# Patient Record
Sex: FEMALE | Race: WHITE | NOT HISPANIC OR LATINO | Employment: OTHER | URBAN - METROPOLITAN AREA
[De-identification: names, ages, dates, MRNs, and addresses within clinical notes are randomized per-mention and may not be internally consistent; named-entity substitution may affect disease eponyms.]

---

## 2017-02-07 RX ORDER — LEVOTHYROXINE SODIUM 0.03 MG/1
TABLET ORAL
Qty: 90 TABLET | Refills: 1 | Status: SHIPPED | OUTPATIENT
Start: 2017-02-07 | End: 2017-08-11 | Stop reason: SDUPTHER

## 2017-02-07 RX ORDER — PRAVASTATIN SODIUM 40 MG
TABLET ORAL
Qty: 90 TABLET | Refills: 1 | Status: SHIPPED | OUTPATIENT
Start: 2017-02-07 | End: 2017-08-11 | Stop reason: SDUPTHER

## 2017-02-07 RX ORDER — OMEPRAZOLE 20 MG/1
CAPSULE, DELAYED RELEASE ORAL
Qty: 90 CAPSULE | Refills: 1 | Status: SHIPPED | OUTPATIENT
Start: 2017-02-07 | End: 2017-08-11 | Stop reason: SDUPTHER

## 2017-02-11 ENCOUNTER — OFFICE VISIT (OUTPATIENT)
Dept: FAMILY MEDICINE CLINIC | Facility: CLINIC | Age: 67
End: 2017-02-11

## 2017-02-11 VITALS
BODY MASS INDEX: 25.87 KG/M2 | HEIGHT: 63 IN | WEIGHT: 146 LBS | SYSTOLIC BLOOD PRESSURE: 110 MMHG | TEMPERATURE: 97.8 F | DIASTOLIC BLOOD PRESSURE: 64 MMHG

## 2017-02-11 DIAGNOSIS — J20.9 ACUTE BRONCHITIS, UNSPECIFIED ORGANISM: Primary | ICD-10-CM

## 2017-02-11 DIAGNOSIS — E03.9 ACQUIRED HYPOTHYROIDISM: ICD-10-CM

## 2017-02-11 DIAGNOSIS — E78.01 FAMILIAL HYPERCHOLESTEROLEMIA: ICD-10-CM

## 2017-02-11 DIAGNOSIS — J06.9 ACUTE URI: ICD-10-CM

## 2017-02-11 PROCEDURE — 99213 OFFICE O/P EST LOW 20 MIN: CPT | Performed by: FAMILY MEDICINE

## 2017-02-11 RX ORDER — ALBUTEROL SULFATE 90 UG/1
2 AEROSOL, METERED RESPIRATORY (INHALATION) EVERY 4 HOURS PRN
Qty: 1 INHALER | Refills: 2 | Status: SHIPPED | OUTPATIENT
Start: 2017-02-11 | End: 2017-08-29

## 2017-02-11 RX ORDER — BENZONATATE 200 MG/1
200 CAPSULE ORAL 3 TIMES DAILY PRN
Qty: 39 CAPSULE | Refills: 3 | Status: SHIPPED | OUTPATIENT
Start: 2017-02-11 | End: 2018-07-16

## 2017-02-11 RX ORDER — AZITHROMYCIN 250 MG/1
TABLET, FILM COATED ORAL
Qty: 6 TABLET | Refills: 1 | Status: SHIPPED | OUTPATIENT
Start: 2017-02-11 | End: 2017-08-29

## 2017-02-11 NOTE — PROGRESS NOTES
Chief Complaint   Patient presents with   • Cough     dry cough, sore throat, x 2 days       Subjective.../HPI  Patient present today with cough. Ok if quite. Has coughing spells. Slight productive. Sore throat. Symptoms x 2 days.  I have reviewed the patient's medical history in detail and updated the computerized patient record.    Family History   Problem Relation Age of Onset   • Diabetes Maternal Grandmother        Social History     Social History   • Marital status:      Spouse name: N/A   • Number of children: N/A   • Years of education: N/A     Occupational History   • Not on file.     Social History Main Topics   • Smoking status: Never Smoker   • Smokeless tobacco: Never Used   • Alcohol use Yes      Comment: rarely   • Drug use: No   • Sexual activity: Not on file     Other Topics Concern   • Not on file     Social History Narrative       Review of Systems:   Review of Systems   Constitutional: Negative for chills, fatigue, fever and unexpected weight change.   HENT: Positive for sore throat. Negative for ear pain, hearing loss, sinus pressure and tinnitus.    Eyes: Negative for pain, discharge and redness.   Respiratory: Positive for cough. Negative for shortness of breath and wheezing.    Cardiovascular: Negative for chest pain, palpitations and leg swelling.   Gastrointestinal: Negative for abdominal pain, constipation, diarrhea and nausea.   Endocrine: Negative for cold intolerance and heat intolerance.   Genitourinary: Negative for difficulty urinating, flank pain and urgency.   Musculoskeletal: Negative for back pain, joint swelling and myalgias.   Skin: Negative for rash and wound.   Allergic/Immunologic: Negative for environmental allergies and food allergies.   Neurological: Negative for dizziness, seizures, numbness and headaches.   Hematological: Negative for adenopathy. Does not bruise/bleed easily.   Psychiatric/Behavioral: Negative for decreased concentration, dysphoric mood and  "sleep disturbance. The patient is not nervous/anxious.    All other systems reviewed and are negative.      Objective:  Vital Signs     Vitals:    02/11/17 1050   BP: 110/64   BP Location: Left arm   Patient Position: Sitting   Temp: 97.8 °F (36.6 °C)   TempSrc: Oral   Weight: 146 lb (66.2 kg)   Height: 63\" (160 cm)     Physical Exam   Constitutional: She is oriented to person, place, and time. She appears well-developed and well-nourished.   HENT:   Head: Normocephalic.   Eyes: Pupils are equal, round, and reactive to light.   Neck: Normal range of motion.   Cardiovascular: Normal rate and regular rhythm.    Pulmonary/Chest: Effort normal. She has wheezes (slight raspy with cough).   Abdominal: Soft.   Musculoskeletal: Normal range of motion.   Neurological: She is alert and oriented to person, place, and time.   Skin: Skin is warm and dry.        Results Review:      REVIEWED AND DISCUSSED CLINICAL RESULTS WITH PATIENT                          Current Outpatient Prescriptions:   •  Biotin 2500 MCG capsule, Take 2 tablets by mouth Daily., Disp: , Rfl:   •  levothyroxine (SYNTHROID, LEVOTHROID) 25 MCG tablet, TAKE 1 TABLET EVERY DAY, Disp: 90 tablet, Rfl: 1  •  omeprazole (priLOSEC) 20 MG capsule, TAKE 1 CAPSULE ONE TIME DAILY, Disp: 90 capsule, Rfl: 1  •  pravastatin (PRAVACHOL) 40 MG tablet, TAKE 1 TABLET ONE TIME DAILY, Disp: 90 tablet, Rfl: 1  •  albuterol (PROVENTIL HFA;VENTOLIN HFA) 108 (90 BASE) MCG/ACT inhaler, Inhale 2 puffs Every 4 (Four) Hours As Needed for wheezing., Disp: 1 inhaler, Rfl: 2  •  azithromycin (ZITHROMAX Z-SRINATH) 250 MG tablet, Take 2 tablets the first day, then 1 tablet daily for 4 days., Disp: 6 tablet, Rfl: 1  •  benzonatate (TESSALON) 200 MG capsule, Take 1 capsule by mouth 3 (Three) Times a Day As Needed for cough., Disp: 39 capsule, Rfl: 3    Procedures    Assessment/Plan     Diagnoses and all orders for this visit:    Acute bronchitis, unspecified organism    Acute URI    Acquired " hypothyroidism    Familial hypercholesterolemia    Other orders  -     azithromycin (ZITHROMAX Z-SRINATH) 250 MG tablet; Take 2 tablets the first day, then 1 tablet daily for 4 days.  -     benzonatate (TESSALON) 200 MG capsule; Take 1 capsule by mouth 3 (Three) Times a Day As Needed for cough.  -     albuterol (PROVENTIL HFA;VENTOLIN HFA) 108 (90 BASE) MCG/ACT inhaler; Inhale 2 puffs Every 4 (Four) Hours As Needed for wheezing.           Ari Gloria Jr., MD  02/11/17  12:17 PM

## 2017-08-11 RX ORDER — LEVOTHYROXINE SODIUM 0.03 MG/1
TABLET ORAL
Qty: 90 TABLET | Refills: 0 | Status: SHIPPED | OUTPATIENT
Start: 2017-08-11 | End: 2017-10-17 | Stop reason: SDUPTHER

## 2017-08-11 RX ORDER — OMEPRAZOLE 20 MG/1
CAPSULE, DELAYED RELEASE ORAL
Qty: 90 CAPSULE | Refills: 0 | Status: SHIPPED | OUTPATIENT
Start: 2017-08-11 | End: 2017-10-17 | Stop reason: SDUPTHER

## 2017-08-11 RX ORDER — PRAVASTATIN SODIUM 40 MG
TABLET ORAL
Qty: 90 TABLET | Refills: 0 | Status: SHIPPED | OUTPATIENT
Start: 2017-08-11 | End: 2017-10-17 | Stop reason: SDUPTHER

## 2017-08-29 ENCOUNTER — OFFICE VISIT (OUTPATIENT)
Dept: FAMILY MEDICINE CLINIC | Facility: CLINIC | Age: 67
End: 2017-08-29

## 2017-08-29 VITALS
HEIGHT: 63 IN | WEIGHT: 140 LBS | OXYGEN SATURATION: 98 % | SYSTOLIC BLOOD PRESSURE: 108 MMHG | TEMPERATURE: 97.8 F | HEART RATE: 97 BPM | DIASTOLIC BLOOD PRESSURE: 64 MMHG | BODY MASS INDEX: 24.8 KG/M2

## 2017-08-29 DIAGNOSIS — R42 VERTIGO: ICD-10-CM

## 2017-08-29 DIAGNOSIS — R79.9 ABNORMAL FINDING OF BLOOD CHEMISTRY: ICD-10-CM

## 2017-08-29 DIAGNOSIS — K21.00 GASTROESOPHAGEAL REFLUX DISEASE WITH ESOPHAGITIS: Primary | ICD-10-CM

## 2017-08-29 DIAGNOSIS — E78.01 FAMILIAL HYPERCHOLESTEROLEMIA: ICD-10-CM

## 2017-08-29 DIAGNOSIS — E03.9 ACQUIRED HYPOTHYROIDISM: ICD-10-CM

## 2017-08-29 DIAGNOSIS — R94.6 ABNORMAL RESULTS OF THYROID FUNCTION STUDIES: ICD-10-CM

## 2017-08-29 PROCEDURE — 99213 OFFICE O/P EST LOW 20 MIN: CPT | Performed by: FAMILY MEDICINE

## 2017-08-29 NOTE — PROGRESS NOTES
"  Chief Complaint   Patient presents with   • Earache     pt has sore spot behind right ear its getting worse       Subjective.../HPI  Patient present today with sore knot behind right ear. Feels like a rubber band. First noticed this in her 30's. This time sore x 6 weeks but much better now., began after swimming in pool. Getting dizzy x 6 weeks when laying down and gets up.     I have reviewed the patient's medical history in detail and updated the computerized patient record.    Family History   Problem Relation Age of Onset   • Diabetes Maternal Grandmother        Social History     Social History   • Marital status:      Spouse name: N/A   • Number of children: N/A   • Years of education: N/A     Occupational History   • Not on file.     Social History Main Topics   • Smoking status: Never Smoker   • Smokeless tobacco: Never Used   • Alcohol use Yes      Comment: rarely   • Drug use: No   • Sexual activity: Not on file     Other Topics Concern   • Not on file     Social History Narrative       Review of Systems:   Review of Systems   Constitutional: Negative.    HENT: Positive for facial swelling.    Eyes: Negative.    Respiratory: Negative.    Cardiovascular: Negative.    Gastrointestinal: Negative.    Endocrine: Negative.    Genitourinary: Negative.    Skin: Negative.    Allergic/Immunologic: Negative.    Neurological: Positive for headaches.   Psychiatric/Behavioral: Negative.        Objective:  Vital Signs     Vitals:    08/29/17 1635   BP: 108/64   BP Location: Left arm   Patient Position: Sitting   Pulse: 97   Temp: 97.8 °F (36.6 °C)   TempSrc: Oral   SpO2: 98%   Weight: 140 lb (63.5 kg)   Height: 63\" (160 cm)     Physical Exam   Constitutional: She is oriented to person, place, and time. She appears well-developed and well-nourished.   HENT:   Head: Normocephalic.   Tender node behind right ear. Minimal tenderness.    Eyes: Pupils are equal, round, and reactive to light.   Neck: Normal range of " motion.   Cardiovascular: Normal rate, regular rhythm and normal heart sounds.    Pulmonary/Chest: Effort normal.   Abdominal: Soft.   Musculoskeletal: Normal range of motion.   Neurological: She is alert and oriented to person, place, and time.   Skin: Skin is warm and dry.        Results Review:      REVIEWED AND DISCUSSED CLINICAL RESULTS WITH PATIENT                          Current Outpatient Prescriptions:   •  benzonatate (TESSALON) 200 MG capsule, Take 1 capsule by mouth 3 (Three) Times a Day As Needed for cough., Disp: 39 capsule, Rfl: 3  •  Biotin 2500 MCG capsule, Take 2 tablets by mouth Daily., Disp: , Rfl:   •  levothyroxine (SYNTHROID, LEVOTHROID) 25 MCG tablet, TAKE 1 TABLET EVERY DAY, Disp: 90 tablet, Rfl: 0  •  omeprazole (priLOSEC) 20 MG capsule, TAKE 1 CAPSULE EVERY DAY, Disp: 90 capsule, Rfl: 0  •  pravastatin (PRAVACHOL) 40 MG tablet, TAKE 1 TABLET EVERY DAY, Disp: 90 tablet, Rfl: 0    Procedures    Assessment/Plan     Diagnoses and all orders for this visit:    Gastroesophageal reflux disease with esophagitis  -     MRI internal auditory canal wo  -     CBC & Differential  -     Comprehensive Metabolic Panel  -     Lipid Panel With LDL / HDL Ratio  -     Hemoglobin A1c  -     Thyroid Panel With TSH    Vertigo  -     MRI internal auditory canal wo  -     CBC & Differential  -     Comprehensive Metabolic Panel  -     Lipid Panel With LDL / HDL Ratio  -     Hemoglobin A1c  -     Thyroid Panel With TSH    Familial hypercholesterolemia    Acquired hypothyroidism    Abnormal finding of blood chemistry   -     Lipid Panel With LDL / HDL Ratio  -     Hemoglobin A1c    Abnormal results of thyroid function studies   -     Thyroid Panel With TSH       elevate head of bed on 4 inch blocks.   Decrease omeprazole to m/w/f only    Ari Gloria Jr., MD  08/29/17  5:59 PM

## 2017-09-02 LAB
ALBUMIN SERPL-MCNC: 4.2 G/DL (ref 3.6–4.8)
ALBUMIN/GLOB SERPL: 1.6 {RATIO} (ref 1.2–2.2)
ALP SERPL-CCNC: 69 IU/L (ref 39–117)
ALT SERPL-CCNC: 15 IU/L (ref 0–32)
AST SERPL-CCNC: 25 IU/L (ref 0–40)
BASOPHILS # BLD AUTO: 0 X10E3/UL (ref 0–0.2)
BASOPHILS NFR BLD AUTO: 1 %
BILIRUB SERPL-MCNC: 0.6 MG/DL (ref 0–1.2)
BUN SERPL-MCNC: 12 MG/DL (ref 8–27)
BUN/CREAT SERPL: 14 (ref 12–28)
CALCIUM SERPL-MCNC: 9.3 MG/DL (ref 8.7–10.3)
CHLORIDE SERPL-SCNC: 103 MMOL/L (ref 96–106)
CHOLEST SERPL-MCNC: 179 MG/DL (ref 100–199)
CO2 SERPL-SCNC: 26 MMOL/L (ref 18–29)
CREAT SERPL-MCNC: 0.83 MG/DL (ref 0.57–1)
EOSINOPHIL # BLD AUTO: 0.1 X10E3/UL (ref 0–0.4)
EOSINOPHIL NFR BLD AUTO: 2 %
ERYTHROCYTE [DISTWIDTH] IN BLOOD BY AUTOMATED COUNT: 16.8 % (ref 12.3–15.4)
FT4I SERPL CALC-MCNC: 2.5 (ref 1.2–4.9)
GLOBULIN SER CALC-MCNC: 2.7 G/DL (ref 1.5–4.5)
GLUCOSE SERPL-MCNC: 87 MG/DL (ref 65–99)
HBA1C MFR BLD: 5.9 % (ref 4.8–5.6)
HCT VFR BLD AUTO: 36.9 % (ref 34–46.6)
HDLC SERPL-MCNC: 57 MG/DL
HGB BLD-MCNC: 11.6 G/DL (ref 11.1–15.9)
IMM GRANULOCYTES # BLD: 0 X10E3/UL (ref 0–0.1)
IMM GRANULOCYTES NFR BLD: 0 %
LDLC SERPL CALC-MCNC: 100 MG/DL (ref 0–99)
LDLC/HDLC SERPL: 1.8 RATIO UNITS (ref 0–3.2)
LYMPHOCYTES # BLD AUTO: 1.2 X10E3/UL (ref 0.7–3.1)
LYMPHOCYTES NFR BLD AUTO: 34 %
MCH RBC QN AUTO: 26.3 PG (ref 26.6–33)
MCHC RBC AUTO-ENTMCNC: 31.4 G/DL (ref 31.5–35.7)
MCV RBC AUTO: 84 FL (ref 79–97)
MONOCYTES # BLD AUTO: 0.4 X10E3/UL (ref 0.1–0.9)
MONOCYTES NFR BLD AUTO: 10 %
NEUTROPHILS # BLD AUTO: 1.9 X10E3/UL (ref 1.4–7)
NEUTROPHILS NFR BLD AUTO: 53 %
PLATELET # BLD AUTO: 269 X10E3/UL (ref 150–379)
POTASSIUM SERPL-SCNC: 5.2 MMOL/L (ref 3.5–5.2)
PROT SERPL-MCNC: 6.9 G/DL (ref 6–8.5)
RBC # BLD AUTO: 4.41 X10E6/UL (ref 3.77–5.28)
SODIUM SERPL-SCNC: 141 MMOL/L (ref 134–144)
T3RU NFR SERPL: 27 % (ref 24–39)
T4 SERPL-MCNC: 9.4 UG/DL (ref 4.5–12)
TRIGL SERPL-MCNC: 111 MG/DL (ref 0–149)
TSH SERPL DL<=0.005 MIU/L-ACNC: 2.42 UIU/ML (ref 0.45–4.5)
VLDLC SERPL CALC-MCNC: 22 MG/DL (ref 5–40)
WBC # BLD AUTO: 3.6 X10E3/UL (ref 3.4–10.8)

## 2017-09-11 ENCOUNTER — HOSPITAL ENCOUNTER (OUTPATIENT)
Dept: MRI IMAGING | Facility: HOSPITAL | Age: 67
Discharge: HOME | End: 2017-09-11
Attending: FAMILY MEDICINE | Admitting: FAMILY MEDICINE

## 2017-09-11 PROCEDURE — 70551 MRI BRAIN STEM W/O DYE: CPT

## 2017-09-18 ENCOUNTER — TELEPHONE (OUTPATIENT)
Dept: FAMILY MEDICINE CLINIC | Facility: CLINIC | Age: 67
End: 2017-09-18

## 2017-09-18 DIAGNOSIS — R42 VERTIGO: Primary | ICD-10-CM

## 2017-09-18 NOTE — TELEPHONE ENCOUNTER
----- Message from Maryan Flanagan sent at 9/18/2017  1:14 PM EDT -----  Regarding: Results  Contact: [phone]  Patient had labs done on 9/1/17 and MRI on 9/11/17. She would like a call regarding the results.    Informed patient to give 48 hrs for call back.    Thank you  Last office visit 8/29/17

## 2017-09-18 NOTE — PROGRESS NOTES
I told the patient the results of her MRI of the brain and her lab tests.  She still is having some dizziness.  We'll get ENT consult.

## 2017-10-18 RX ORDER — OMEPRAZOLE 20 MG/1
CAPSULE, DELAYED RELEASE ORAL
Qty: 90 CAPSULE | Refills: 2 | Status: SHIPPED | OUTPATIENT
Start: 2017-10-18 | End: 2018-01-18 | Stop reason: SDUPTHER

## 2017-10-18 RX ORDER — PRAVASTATIN SODIUM 40 MG
TABLET ORAL
Qty: 90 TABLET | Refills: 2 | Status: SHIPPED | OUTPATIENT
Start: 2017-10-18 | End: 2018-01-18 | Stop reason: SDUPTHER

## 2017-10-18 RX ORDER — LEVOTHYROXINE SODIUM 0.03 MG/1
TABLET ORAL
Qty: 90 TABLET | Refills: 2 | Status: SHIPPED | OUTPATIENT
Start: 2017-10-18 | End: 2018-01-18 | Stop reason: SDUPTHER

## 2017-11-12 ENCOUNTER — APPOINTMENT (OUTPATIENT)
Dept: GENERAL RADIOLOGY | Facility: HOSPITAL | Age: 67
End: 2017-11-12

## 2017-11-12 ENCOUNTER — HOSPITAL ENCOUNTER (EMERGENCY)
Facility: HOSPITAL | Age: 67
Discharge: HOME | End: 2017-11-12
Attending: EMERGENCY MEDICINE | Admitting: EMERGENCY MEDICINE

## 2017-11-12 VITALS
HEIGHT: 63 IN | TEMPERATURE: 98.1 F | WEIGHT: 138 LBS | HEART RATE: 81 BPM | RESPIRATION RATE: 18 BRPM | SYSTOLIC BLOOD PRESSURE: 125 MMHG | DIASTOLIC BLOOD PRESSURE: 70 MMHG | OXYGEN SATURATION: 100 % | BODY MASS INDEX: 24.45 KG/M2

## 2017-11-12 DIAGNOSIS — S82.891A CLOSED FRACTURE OF RIGHT ANKLE, INITIAL ENCOUNTER: Primary | ICD-10-CM

## 2017-11-12 PROCEDURE — 73610 X-RAY EXAM OF ANKLE: CPT

## 2017-11-12 PROCEDURE — 99282 EMERGENCY DEPT VISIT SF MDM: CPT

## 2017-11-12 RX ORDER — ONDANSETRON 4 MG/1
4 TABLET, FILM COATED ORAL EVERY 8 HOURS PRN
Qty: 15 TABLET | Refills: 0 | Status: SHIPPED | OUTPATIENT
Start: 2017-11-12 | End: 2018-07-16

## 2017-11-12 RX ORDER — UBIDECARENONE 75 MG
50 CAPSULE ORAL DAILY
COMMUNITY
End: 2018-07-16 | Stop reason: DRUGHIGH

## 2017-11-12 RX ORDER — HYDROCODONE BITARTRATE AND ACETAMINOPHEN 7.5; 325 MG/1; MG/1
1 TABLET ORAL EVERY 6 HOURS PRN
Qty: 15 TABLET | Refills: 0 | Status: SHIPPED | OUTPATIENT
Start: 2017-11-12 | End: 2018-07-16

## 2017-11-12 NOTE — ED TRIAGE NOTES
"Patient c/o right foot/ankle pain post fall; pt reports she was walking down the steps when she mis-stepped and landed toe first; after this the patient heard a \"cracking sound\"  Pt reports she fell but denies hitting head and states, \"I caught myself\" No obvious deformity and pedal pulses present in both lower extremities, pt denies any abnormal sensations in right foot other than pain in on the top of her foot.  "

## 2017-11-13 NOTE — ED PROVIDER NOTES
I supervised care provided by the midlevel provider.    We have discussed this patient's history, physical exam, and treatment plan.   I have reviewed the note and personally saw and examined the patient and agree with the plan of care.    Brief Hx - Fell and injured ankle.    PE - Mild TTP at lateral malleolus    Reviewed xray.  Will place boot and FU ortho.         Ronnell Moralez MD  11/12/17 2015

## 2017-11-13 NOTE — ED PROVIDER NOTES
EMERGENCY DEPARTMENT ENCOUNTER    CHIEF COMPLAINT  Chief Complaint: R ankle pain  History given by: pt   History limited by: none  Room Number: 28/28  PMD: Ari Gloria Jr., MD      HPI:  Pt is a 67 y.o. female who presents complaining of R ankle pain s/p injury at 1800 today that increases with movement of her foot. Pt state that she was walking down stairs, did not completely plant her foot, and hyper-plamtarflexed her R foot. Pt states that she collapsed due to her pain, but denies falling, hitting her head, or LOC. Pt denies a Hx of ankle injuries.     Duration:  Since 1800 today  Onset: s/p injury   Timing: constant   Location: R ankle   Radiation: none stated   Quality: pain   Intensity/Severity: moderate   Progression: unchanged   Associated Symptoms: none   Aggravating Factors: foot movement   Alleviating Factors: none stated   Previous Episodes: none stated   Treatment before arrival: none     PAST MEDICAL HISTORY  Active Ambulatory Problems     Diagnosis Date Noted   • No Active Ambulatory Problems     Resolved Ambulatory Problems     Diagnosis Date Noted   • No Resolved Ambulatory Problems     Past Medical History:   Diagnosis Date   • Dizziness    • Fibrocystic breast    • GERD (gastroesophageal reflux disease)    • Hyperlipidemia    • Hypothyroidism    • Seizures    • Syncopal episodes        PAST SURGICAL HISTORY  Past Surgical History:   Procedure Laterality Date   • BLADDER SURGERY  2015    bladder lift   • HYSTERECTOMY  2015   • OOPHORECTOMY         FAMILY HISTORY  Family History   Problem Relation Age of Onset   • Diabetes Maternal Grandmother        SOCIAL HISTORY  Social History     Social History   • Marital status:      Spouse name: N/A   • Number of children: N/A   • Years of education: N/A     Occupational History   • Not on file.     Social History Main Topics   • Smoking status: Never Smoker   • Smokeless tobacco: Never Used   • Alcohol use Yes      Comment: rarely   • Drug  use: No   • Sexual activity: Defer     Other Topics Concern   • Not on file     Social History Narrative   • No narrative on file       ALLERGIES  Review of patient's allergies indicates no known allergies.    REVIEW OF SYSTEMS  Review of Systems   Constitutional: Negative.    HENT: Negative.    Respiratory: Negative.    Cardiovascular: Negative.    Gastrointestinal: Negative.    Musculoskeletal: Positive for myalgias (R ankle).   Skin: Negative.    Neurological: Negative.  Negative for dizziness, weakness, light-headedness and numbness.       PHYSICAL EXAM  ED Triage Vitals   Temp Heart Rate Resp BP SpO2   11/12/17 1820 11/12/17 1820 11/12/17 1823 11/12/17 1823 11/12/17 1820   98.1 °F (36.7 °C) 81 18 125/70 100 %      Temp src Heart Rate Source Patient Position BP Location FiO2 (%)   11/12/17 1820 -- -- -- --   Tympanic           Physical Exam   Constitutional: She is oriented to person, place, and time and well-developed, well-nourished, and in no distress.   Eyes: EOM are normal.   Neck: Normal range of motion.   Cardiovascular: Normal rate and regular rhythm.    Pulmonary/Chest: Effort normal and breath sounds normal. No respiratory distress.   Musculoskeletal: She exhibits tenderness (R anterior ankle). She exhibits no deformity.   No medial or lateral ankle tenderness   Neurological: She is alert and oriented to person, place, and time. She has normal sensation and normal strength.   Neurovascularly intact   Skin: Skin is warm and dry.   Psychiatric: Affect normal.   Nursing note and vitals reviewed.      RADIOLOGY  XR Ankle 3+ View Right   Final Result   1. No acute osseous abnormality.       This report was finalized on 11/12/2017 7:13 PM by Dustin Loo MD.               I ordered the above noted radiological studies. Interpreted by radiologist. Reviewed by me in PACS.       PROCEDURES  Procedures      PROGRESS AND CONSULTS  ED Course   1840  Ordered XR R ankle for further evaluation.   1932  Discussed  the pt's XR findings with no definite acute fracture, but possible small avulsion fracture. Plan to d/c the pt in a boot and with a f/u with an orthopedist. Pt understands and agrees with the plan, and all questions were answered.   2004  Discussed the pt's case with Dr. Moralez. After a bedside examination, Dr. Moralez agrees with the plan of care.   2024  Rechecked pt, who is resting comfortably. Discussed plan to d/c the pt in a boot and to f/u with an orthopedist. Pt understands and agrees with the plan, and all questions were answered.     MEDICAL DECISION MAKING  Results were reviewed/discussed with the patient and they were also made aware of online access. Pt also made aware that some labs, such as cultures, will not be resulted during ER visit and follow up with PMD is necessary.     MDM  Number of Diagnoses or Management Options  Closed fracture of right ankle, initial encounter:      Amount and/or Complexity of Data Reviewed  Tests in the radiology section of CPT®: ordered and reviewed (XR L ankle: no acute fracture; small avulsion injury along dorsal talus. )    Patient Progress  Patient progress: stable         DIAGNOSIS  Final diagnoses:   Closed fracture of right ankle, initial encounter       DISPOSITION  DISCHARGE    Patient discharged in stable condition.    Reviewed implications of results, diagnosis, meds, responsibility to follow up, warning signs and symptoms of possible worsening, potential complications and reasons to return to ER.    Patient/Family voiced understanding of above instructions.    Discussed plan for discharge, as there is no emergent indication for admission.  Pt/family is agreeable and understands need for follow up and repeat testing.  Pt is aware that discharge does not mean that nothing is wrong but it indicates no emergency is present that requires admission and they must continue care with follow-up as given below or physician of their choice.     FOLLOW-UP  Ayo JUAREZ  MD [facility]  [address]  [facility] [address]  [phone]    Call in 1 day  to set up a follow up appointment         Medication List      New Prescriptions          HYDROcodone-acetaminophen 7.5-325 MG per tablet   Commonly known as:  NORCO   Take 1 tablet by mouth Every 6 (Six) Hours As Needed for Severe Pain .       ondansetron 4 MG tablet   Commonly known as:  ZOFRAN   Take 1 tablet by mouth Every 8 (Eight) Hours As Needed for Nausea or   Vomiting.         Changed          omeprazole 20 MG capsule   Commonly known as:  priLOSEC   TAKE 1 CAPSULE EVERY DAY   What changed:  See the new instructions.         Stop          benzonatate 200 MG capsule   Commonly known as:  TESSALON               Latest Documented Vital Signs:  As of 8:31 PM  BP- 125/70 HR- 81 Temp- 98.1 °F (36.7 °C) (Tympanic) O2 sat- 100%    --  Documentation assistance provided by chucky Liriano for TYRON Da Silva.  Information recorded by the scribe was done at my direction and has been verified and validated by me.       Biju Liriano  11/12/17 2031       TYRON Martínez  11/12/17 2128

## 2017-11-15 ENCOUNTER — TELEPHONE (OUTPATIENT)
Dept: SOCIAL WORK | Facility: HOSPITAL | Age: 67
End: 2017-11-15

## 2017-11-15 NOTE — TELEPHONE ENCOUNTER
ER F/U phone call:  Pt states that she is doing well. Denies the need to see her PCP or the orthopedist. No other questions or concerns voiced at this time. Marcela Rodriguez RN

## 2017-12-14 ENCOUNTER — EPISODE CHANGES (OUTPATIENT)
Dept: CASE MANAGEMENT | Facility: OTHER | Age: 67
End: 2017-12-14

## 2018-01-18 ENCOUNTER — OFFICE VISIT (OUTPATIENT)
Dept: OBSTETRICS AND GYNECOLOGY | Facility: CLINIC | Age: 68
End: 2018-01-18

## 2018-01-18 ENCOUNTER — PROCEDURE VISIT (OUTPATIENT)
Dept: OBSTETRICS AND GYNECOLOGY | Facility: CLINIC | Age: 68
End: 2018-01-18

## 2018-01-18 VITALS
DIASTOLIC BLOOD PRESSURE: 80 MMHG | HEIGHT: 55 IN | SYSTOLIC BLOOD PRESSURE: 120 MMHG | WEIGHT: 137.5 LBS | BODY MASS INDEX: 31.82 KG/M2

## 2018-01-18 DIAGNOSIS — M85.88 OSTEOPENIA OF SPINE: ICD-10-CM

## 2018-01-18 DIAGNOSIS — Z78.0 POSTMENOPAUSAL: ICD-10-CM

## 2018-01-18 DIAGNOSIS — Z01.419 GYNECOLOGIC EXAM NORMAL: Primary | ICD-10-CM

## 2018-01-18 DIAGNOSIS — Z13.820 SCREENING FOR OSTEOPOROSIS: Primary | ICD-10-CM

## 2018-01-18 DIAGNOSIS — M85.88 OSTEOPENIA OF OTHER SITE: ICD-10-CM

## 2018-01-18 PROCEDURE — 77080 DXA BONE DENSITY AXIAL: CPT | Performed by: NURSE PRACTITIONER

## 2018-01-18 PROCEDURE — G0101 CA SCREEN;PELVIC/BREAST EXAM: HCPCS | Performed by: NURSE PRACTITIONER

## 2018-01-18 RX ORDER — OMEPRAZOLE 20 MG/1
20 CAPSULE, DELAYED RELEASE ORAL DAILY
Qty: 90 CAPSULE | Refills: 2 | Status: SHIPPED | OUTPATIENT
Start: 2018-01-18 | End: 2018-01-24 | Stop reason: SDUPTHER

## 2018-01-18 RX ORDER — LEVOTHYROXINE SODIUM 0.03 MG/1
25 TABLET ORAL DAILY
Qty: 90 TABLET | Refills: 2 | Status: SHIPPED | OUTPATIENT
Start: 2018-01-18 | End: 2018-01-24 | Stop reason: SDUPTHER

## 2018-01-18 RX ORDER — PRAVASTATIN SODIUM 40 MG
40 TABLET ORAL DAILY
Qty: 90 TABLET | Refills: 2 | Status: SHIPPED | OUTPATIENT
Start: 2018-01-18 | End: 2018-01-24 | Stop reason: SDUPTHER

## 2018-01-18 NOTE — PROGRESS NOTES
Penny Brown is a 67 y.o. female.   Chief Complaint   Patient presents with   • Gynecologic Exam     Patient is here for annual.      HPI:  Pt here for gyn exam, voices no c/o  The following portions of the patient's history were reviewed and updated as appropriate: allergies, current medications, past family history, past medical history, past social history, past surgical history and problem list.    Review of Systems  Review of Systems   Constitutional: Negative.  Negative for unexpected weight change.   Respiratory: Negative for chest tightness and shortness of breath.    Cardiovascular: Negative for chest pain and palpitations.   Gastrointestinal: Negative for abdominal pain and blood in stool.   Endocrine: Negative.    Genitourinary: Negative for dyspareunia, dysuria, frequency, hematuria, menstrual problem, pelvic pain, vaginal bleeding, vaginal discharge and vaginal pain.   Musculoskeletal: Negative for joint swelling.   Skin: Negative for color change, rash and wound.   Allergic/Immunologic: Negative.    Psychiatric/Behavioral: Negative.    All other systems reviewed and are negative.      Objective   Physical Exam   Constitutional: She is oriented to person, place, and time. She appears well-developed and well-nourished.   HENT:   Head: Normocephalic.   Neck: Normal range of motion.   Cardiovascular: Normal rate and regular rhythm.    Pulmonary/Chest: Effort normal and breath sounds normal. Right breast exhibits no mass and no nipple discharge. Left breast exhibits no mass and no nipple discharge. There is no breast swelling.   Breasts soft without palpable masses   Abdominal: Soft. Bowel sounds are normal.   Genitourinary: Vagina normal. There is no rash or lesion on the right labia. There is no rash or lesion on the left labia. Cervix exhibits no friability. Right adnexum displays no mass, no tenderness and no fullness. Left adnexum displays no mass, no tenderness and no fullness.   Neurological: She  is alert and oriented to person, place, and time.   Skin: Skin is warm and dry.   Psychiatric: She has a normal mood and affect. Her behavior is normal.   Vitals reviewed.      Assessment/Plan   Patient Instructions   Pt. Counseled today on safe sex practices, self breast examinations discussed. Colonoscopy recommended.  Bone Density Test recommended.  Hormone replacement therapy discussed. Aspirin prophylaxis to reduce risk of stroke.  Calcium and Vitamin D requirements discussed.   Diet and exercise also counseled.       Penny was seen today for gynecologic exam.    Diagnoses and all orders for this visit:    Gynecologic exam normal  -     Pap IG, Rfx HPV ASCU - ThinPrep Vial, Cervix    Other orders  -     pravastatin (PRAVACHOL) 40 MG tablet; Take 1 tablet by mouth Daily.  -     omeprazole (priLOSEC) 20 MG capsule; Take 1 capsule by mouth Daily.  -     levothyroxine (SYNTHROID, LEVOTHROID) 25 MCG tablet; Take 1 tablet by mouth Daily.        Return in about 1 year (around 1/18/2019).

## 2018-01-22 LAB
CONV .: NORMAL
CYTOLOGIST CVX/VAG CYTO: NORMAL
CYTOLOGY CVX/VAG DOC THIN PREP: NORMAL
DX ICD CODE: NORMAL
HIV 1 & 2 AB SER-IMP: NORMAL
Lab: NORMAL
OTHER STN SPEC: NORMAL
PATH REPORT.FINAL DX SPEC: NORMAL
RECOM F/U CVX/VAG CYTO: NORMAL
STAT OF ADQ CVX/VAG CYTO-IMP: NORMAL

## 2018-01-24 DIAGNOSIS — K21.9 GASTROESOPHAGEAL REFLUX DISEASE WITHOUT ESOPHAGITIS: ICD-10-CM

## 2018-01-24 DIAGNOSIS — E78.5 HYPERLIPIDEMIA, UNSPECIFIED HYPERLIPIDEMIA TYPE: ICD-10-CM

## 2018-01-24 DIAGNOSIS — E07.9 THYROID DISEASE: Primary | ICD-10-CM

## 2018-01-24 RX ORDER — LEVOTHYROXINE SODIUM 0.03 MG/1
25 TABLET ORAL DAILY
Qty: 90 TABLET | Refills: 2 | Status: SHIPPED | OUTPATIENT
Start: 2018-01-24 | End: 2018-10-29 | Stop reason: SDUPTHER

## 2018-01-24 RX ORDER — PRAVASTATIN SODIUM 40 MG
40 TABLET ORAL DAILY
Qty: 90 TABLET | Refills: 0 | Status: SHIPPED | OUTPATIENT
Start: 2018-01-24 | End: 2018-04-30 | Stop reason: SDUPTHER

## 2018-01-24 RX ORDER — OMEPRAZOLE 20 MG/1
20 CAPSULE, DELAYED RELEASE ORAL DAILY
Qty: 90 CAPSULE | Refills: 2 | Status: SHIPPED | OUTPATIENT
Start: 2018-01-24 | End: 2019-07-08

## 2018-04-30 DIAGNOSIS — E78.5 HYPERLIPIDEMIA, UNSPECIFIED HYPERLIPIDEMIA TYPE: ICD-10-CM

## 2018-04-30 RX ORDER — PRAVASTATIN SODIUM 40 MG
TABLET ORAL
Qty: 90 TABLET | Refills: 0 | Status: SHIPPED | OUTPATIENT
Start: 2018-04-30 | End: 2018-08-08 | Stop reason: SDUPTHER

## 2018-07-02 ENCOUNTER — TRANSCRIBE ORDERS (OUTPATIENT)
Dept: ADMINISTRATIVE | Facility: HOSPITAL | Age: 68
End: 2018-07-02

## 2018-07-02 DIAGNOSIS — Z12.31 VISIT FOR SCREENING MAMMOGRAM: Primary | ICD-10-CM

## 2018-07-10 ENCOUNTER — EPISODE CHANGES (OUTPATIENT)
Dept: CASE MANAGEMENT | Facility: OTHER | Age: 68
End: 2018-07-10

## 2018-07-16 ENCOUNTER — OFFICE VISIT (OUTPATIENT)
Dept: INTERNAL MEDICINE | Facility: CLINIC | Age: 68
End: 2018-07-16

## 2018-07-16 VITALS
WEIGHT: 130.2 LBS | SYSTOLIC BLOOD PRESSURE: 90 MMHG | HEART RATE: 65 BPM | DIASTOLIC BLOOD PRESSURE: 60 MMHG | OXYGEN SATURATION: 98 % | BODY MASS INDEX: 23.07 KG/M2 | HEIGHT: 63 IN

## 2018-07-16 DIAGNOSIS — R73.03 PREDIABETES: ICD-10-CM

## 2018-07-16 DIAGNOSIS — E78.5 HYPERLIPIDEMIA, UNSPECIFIED HYPERLIPIDEMIA TYPE: ICD-10-CM

## 2018-07-16 DIAGNOSIS — E03.9 HYPOTHYROIDISM, UNSPECIFIED TYPE: Primary | ICD-10-CM

## 2018-07-16 DIAGNOSIS — E03.9 HYPOTHYROIDISM, UNSPECIFIED TYPE: ICD-10-CM

## 2018-07-16 DIAGNOSIS — R25.2 LEG CRAMPS: ICD-10-CM

## 2018-07-16 PROCEDURE — 99214 OFFICE O/P EST MOD 30 MIN: CPT | Performed by: FAMILY MEDICINE

## 2018-07-16 RX ORDER — MAGNESIUM GLUCONATE 30 MG(550)
595 TABLET ORAL DAILY
COMMUNITY
End: 2018-07-31

## 2018-07-16 RX ORDER — CYANOCOBALAMIN (VITAMIN B-12) 5000 MCG
2500 TABLET,DISINTEGRATING ORAL DAILY
COMMUNITY
End: 2022-01-04

## 2018-07-16 RX ORDER — PHENOL 1.4 %
600 AEROSOL, SPRAY (ML) MUCOUS MEMBRANE DAILY
COMMUNITY
End: 2022-01-04

## 2018-07-16 NOTE — PROGRESS NOTES
Subjective   Penny Brown is a 67 y.o. female.     Chief Complaint   Patient presents with   • New Patient Visit   • Hypothyroidism   • Hyperlipidemia         History of Present Illness     Patient presents today with a past medical history for hypothyroidism.  Patient's currently taking levothyroxine 25 µg daily.  She denies any side effects of the medication.  Patient states that she's been doing well with the medicine.    Patient's past medical history for hyperlipidemia.  Patient's currently taking pravastatin 40 mg daily.  She denies any side effects to medication.  Patient states that she is doing well with the medicine.    The patient states that in the last office visit she was diagnosed with prediabetes.  Patient states that she's had a family history of diabetes.  Patient states that she's been trying to change her diet significantly in order to help avoid diagnoses diabetes.    She notes that she's got leg cramps at night.  Patient states that her leg cramps is significantly improved after changing diet.  Patient states that she has not had a leg cramp for many weeks.  She states that she read up on line where potassium to help with these cramps, so patient started getting over-the-counter potassium tablets to help her.    The following portions of the patient's history were reviewed and updated as appropriate: allergies, current medications, past family history, past medical history, past social history, past surgical history and problem list.    Review of Systems   Constitutional: Negative for chills and fever.   HENT: Negative for congestion, rhinorrhea, sinus pain and sore throat.    Eyes: Negative for photophobia and visual disturbance.   Respiratory: Negative for cough, chest tightness and shortness of breath.    Cardiovascular: Negative for chest pain and palpitations.   Gastrointestinal: Negative for diarrhea, nausea and vomiting.   Genitourinary: Negative for dysuria, frequency and urgency.    Skin: Negative for rash and wound.   Neurological: Negative for dizziness and syncope.   Psychiatric/Behavioral: Negative for behavioral problems and confusion.       Objective   Physical Exam   Constitutional: She is oriented to person, place, and time. She appears well-developed and well-nourished.   HENT:   Head: Normocephalic and atraumatic.   Right Ear: External ear normal.   Left Ear: External ear normal.   Mouth/Throat: Oropharynx is clear and moist.   Eyes: EOM are normal.   Neck: Normal range of motion. Neck supple.   Cardiovascular: Normal rate, regular rhythm and normal heart sounds.    Pulmonary/Chest: Effort normal and breath sounds normal. No respiratory distress.   Musculoskeletal: Normal range of motion.   Lymphadenopathy:     She has no cervical adenopathy.   Neurological: She is alert and oriented to person, place, and time.   Skin: Skin is warm.   Psychiatric: She has a normal mood and affect. Her behavior is normal.   Nursing note and vitals reviewed.      Assessment/Plan   Penny was seen today for new patient visit, hypothyroidism and hyperlipidemia.    Diagnoses and all orders for this visit:    Hypothyroidism, unspecified type  -     Thyroid Panel With TSH; Future  -     She should continue Levothyroxine 25 mg daily.    Hyperlipidemia, unspecified hyperlipidemia type  -     Comprehensive Metabolic Panel; Future  -     Lipid Panel With LDL / HDL Ratio; Future  -     The patient should continue pravastatin 40 mg daily.    Prediabetes  -     Comprehensive Metabolic Panel; Future  -     Hemoglobin A1c; Future  -     The patient should continue diet and exercise.    Leg cramps  -     Comprehensive Metabolic Panel; Future  -     Advised the patient to discontinue OTC potassium.  We'll check CMP for any electrolyte abnormalities.          No Follow-up on file.    Dictated utilizing Dragon Voice Recognition Software

## 2018-07-18 LAB
ALBUMIN SERPL-MCNC: 4.2 G/DL (ref 3.5–5.2)
ALBUMIN/GLOB SERPL: 1.7 G/DL
ALP SERPL-CCNC: 50 U/L (ref 39–117)
ALT SERPL-CCNC: 16 U/L (ref 1–33)
AST SERPL-CCNC: 25 U/L (ref 1–32)
BILIRUB SERPL-MCNC: 0.6 MG/DL (ref 0.1–1.2)
BUN SERPL-MCNC: 13 MG/DL (ref 8–23)
BUN/CREAT SERPL: 14.8 (ref 7–25)
CALCIUM SERPL-MCNC: 9.4 MG/DL (ref 8.6–10.5)
CHLORIDE SERPL-SCNC: 104 MMOL/L (ref 98–107)
CHOLEST SERPL-MCNC: 184 MG/DL (ref 0–200)
CO2 SERPL-SCNC: 24.6 MMOL/L (ref 22–29)
CREAT SERPL-MCNC: 0.88 MG/DL (ref 0.57–1)
FT4I SERPL CALC-MCNC: 2.6 (ref 1.2–4.9)
GLOBULIN SER CALC-MCNC: 2.5 GM/DL
GLUCOSE SERPL-MCNC: 89 MG/DL (ref 65–99)
HBA1C MFR BLD: 5.67 % (ref 4.8–5.6)
HDLC SERPL-MCNC: 56 MG/DL (ref 40–60)
LDLC SERPL CALC-MCNC: 112 MG/DL (ref 0–100)
LDLC/HDLC SERPL: 2 {RATIO}
POTASSIUM SERPL-SCNC: 4.4 MMOL/L (ref 3.5–5.2)
PROT SERPL-MCNC: 6.7 G/DL (ref 6–8.5)
SODIUM SERPL-SCNC: 143 MMOL/L (ref 136–145)
T3RU NFR SERPL: 26 % (ref 24–39)
T4 SERPL-MCNC: 9.9 UG/DL (ref 4.5–12)
TRIGL SERPL-MCNC: 80 MG/DL (ref 0–150)
TSH SERPL DL<=0.005 MIU/L-ACNC: 2.83 UIU/ML (ref 0.45–4.5)
VLDLC SERPL CALC-MCNC: 16 MG/DL (ref 5–40)

## 2018-07-18 NOTE — PROGRESS NOTES
Please inform the patient of the following abnormal results.  LDL Cholesterol is slightly elevated.  Continue pravastatin.  Patient should exercise and diet.

## 2018-07-24 ENCOUNTER — TELEPHONE (OUTPATIENT)
Dept: INTERNAL MEDICINE | Facility: CLINIC | Age: 68
End: 2018-07-24

## 2018-07-24 NOTE — TELEPHONE ENCOUNTER
----- Message from Julian Garg MD sent at 7/18/2018  3:01 PM EDT -----  Please inform the patient of the following abnormal results.  LDL Cholesterol is slightly elevated.  Continue pravastatin.  Patient should exercise and diet.

## 2018-07-31 ENCOUNTER — OFFICE VISIT (OUTPATIENT)
Dept: INTERNAL MEDICINE | Facility: CLINIC | Age: 68
End: 2018-07-31

## 2018-07-31 VITALS
SYSTOLIC BLOOD PRESSURE: 80 MMHG | BODY MASS INDEX: 22.71 KG/M2 | WEIGHT: 128.2 LBS | HEIGHT: 63 IN | OXYGEN SATURATION: 97 % | TEMPERATURE: 97.9 F | HEART RATE: 64 BPM | DIASTOLIC BLOOD PRESSURE: 50 MMHG

## 2018-07-31 DIAGNOSIS — J02.9 SORE THROAT: ICD-10-CM

## 2018-07-31 DIAGNOSIS — I95.9 HYPOTENSION, UNSPECIFIED HYPOTENSION TYPE: Primary | ICD-10-CM

## 2018-07-31 PROCEDURE — 99213 OFFICE O/P EST LOW 20 MIN: CPT | Performed by: FAMILY MEDICINE

## 2018-07-31 RX ORDER — AMOXICILLIN 500 MG/1
500 TABLET, FILM COATED ORAL 2 TIMES DAILY
Qty: 20 TABLET | Refills: 0 | Status: SHIPPED | OUTPATIENT
Start: 2018-07-31 | End: 2018-08-10

## 2018-07-31 NOTE — PROGRESS NOTES
Subjective   Penny Brown is a 67 y.o. female.     Chief Complaint   Patient presents with   • Sore Throat     x2 days   • Chills         Sore Throat    This is a new problem. The current episode started in the past 7 days. The problem has been unchanged. There has been no fever. The pain is moderate. Associated symptoms include swollen glands and trouble swallowing. Pertinent negatives include no congestion, coughing, ear discharge, headaches, hoarse voice, neck pain or shortness of breath. She has had no exposure to strep. She has tried nothing for the symptoms. The treatment provided no relief.   Chills   Associated symptoms include chills, a sore throat and swollen glands. Pertinent negatives include no congestion, coughing, headaches or neck pain.        The following portions of the patient's history were reviewed and updated as appropriate: allergies, current medications, past family history, past medical history, past social history, past surgical history and problem list.    Review of Systems   Constitutional: Positive for chills.   HENT: Positive for sore throat and trouble swallowing. Negative for congestion, ear discharge and hoarse voice.    Respiratory: Negative for cough and shortness of breath.    Cardiovascular: Negative.    Gastrointestinal: Negative.    Musculoskeletal: Negative for neck pain.   Neurological: Negative for headaches.   Psychiatric/Behavioral: Negative.        Objective   Physical Exam   Constitutional: She is oriented to person, place, and time. She appears well-developed and well-nourished.   HENT:   Head: Normocephalic and atraumatic.   Right Ear: External ear normal.   Left Ear: External ear normal.   Mouth/Throat: Posterior oropharyngeal erythema present.   Eyes: EOM are normal.   Neck: Normal range of motion. Neck supple.   Cardiovascular: Normal rate, regular rhythm and normal heart sounds.    Pulmonary/Chest: Effort normal and breath sounds normal. No respiratory  distress.   Musculoskeletal: Normal range of motion.   Lymphadenopathy:     She has cervical adenopathy.   Neurological: She is alert and oriented to person, place, and time.   Skin: Skin is warm.   Psychiatric: She has a normal mood and affect. Her behavior is normal.   Nursing note and vitals reviewed.      Assessment/Plan   Penny was seen today for sore throat and chills.    Diagnoses and all orders for this visit:    Hypotension, unspecified hypotension type        -     Advised the patient at today's office visit that her blood pressure is low at 80/50.  Patient states that she has not been drinking much fluids.  She notes that she typically doesn't drink much fluid, except about 4 glasses of water a day.  I discussed the patient that she should go to the emergency room perhaps for IV fluids, however patient strongly declined going to the emergency room.  Patient states that she will drink a gallon of water today.  Advised patient to drink plenty of fluids to help bring the blood pressure.  I've also advised her to monitor blood pressure, and if it continues to fall, she should go to the ER.  At this time patient currently does not have any signs of symptoms of hypotension.  I discussed with patient the signs and symptoms that are associated with hypotension, and if these were to occur, patient is advised to go to the ER.    Sore throat  -     amoxicillin (AMOXIL) 500 MG tablet; Take 1 tablet by mouth 2 (Two) Times a Day for 10 days.          No Follow-up on file.    Dictated utilizing Dragon Voice Recognition Software

## 2018-08-08 DIAGNOSIS — E78.5 HYPERLIPIDEMIA, UNSPECIFIED HYPERLIPIDEMIA TYPE: ICD-10-CM

## 2018-08-09 RX ORDER — PRAVASTATIN SODIUM 40 MG
TABLET ORAL
Qty: 90 TABLET | Refills: 0 | Status: SHIPPED | OUTPATIENT
Start: 2018-08-09 | End: 2018-10-29 | Stop reason: SDUPTHER

## 2018-08-27 ENCOUNTER — HOSPITAL ENCOUNTER (OUTPATIENT)
Dept: MAMMOGRAPHY | Facility: HOSPITAL | Age: 68
Discharge: HOME | End: 2018-08-27
Admitting: NURSE PRACTITIONER

## 2018-08-27 DIAGNOSIS — Z12.31 VISIT FOR SCREENING MAMMOGRAM: ICD-10-CM

## 2018-08-27 PROCEDURE — 77067 SCR MAMMO BI INCL CAD: CPT

## 2018-10-29 DIAGNOSIS — E78.5 HYPERLIPIDEMIA, UNSPECIFIED HYPERLIPIDEMIA TYPE: ICD-10-CM

## 2018-10-29 DIAGNOSIS — E07.9 THYROID DISEASE: ICD-10-CM

## 2018-10-30 RX ORDER — PRAVASTATIN SODIUM 40 MG
TABLET ORAL
Qty: 90 TABLET | Refills: 0 | Status: SHIPPED | OUTPATIENT
Start: 2018-10-30 | End: 2019-01-31 | Stop reason: SDUPTHER

## 2018-10-30 RX ORDER — LEVOTHYROXINE SODIUM 0.03 MG/1
TABLET ORAL
Qty: 90 TABLET | Refills: 2 | Status: SHIPPED | OUTPATIENT
Start: 2018-10-30 | End: 2019-01-31 | Stop reason: SDUPTHER

## 2018-11-22 ENCOUNTER — APPOINTMENT (OUTPATIENT)
Dept: CT IMAGING | Facility: HOSPITAL | Age: 68
End: 2018-11-22

## 2018-11-22 ENCOUNTER — HOSPITAL ENCOUNTER (EMERGENCY)
Facility: HOSPITAL | Age: 68
Discharge: HOME | End: 2018-11-23
Attending: EMERGENCY MEDICINE | Admitting: EMERGENCY MEDICINE

## 2018-11-22 DIAGNOSIS — H81.10 BENIGN PAROXYSMAL POSITIONAL VERTIGO, UNSPECIFIED LATERALITY: Primary | ICD-10-CM

## 2018-11-22 LAB
ALBUMIN SERPL-MCNC: 4 G/DL (ref 3.5–5.2)
ALBUMIN/GLOB SERPL: 1.5 G/DL
ALP SERPL-CCNC: 57 U/L (ref 39–117)
ALT SERPL W P-5'-P-CCNC: 12 U/L (ref 1–33)
ANION GAP SERPL CALCULATED.3IONS-SCNC: 11.9 MMOL/L
AST SERPL-CCNC: 24 U/L (ref 1–32)
BASOPHILS # BLD AUTO: 0.01 10*3/MM3 (ref 0–0.2)
BASOPHILS NFR BLD AUTO: 0.2 % (ref 0–1.5)
BILIRUB SERPL-MCNC: 0.3 MG/DL (ref 0.1–1.2)
BUN BLD-MCNC: 16 MG/DL (ref 8–23)
BUN/CREAT SERPL: 16.8 (ref 7–25)
CALCIUM SPEC-SCNC: 9.3 MG/DL (ref 8.6–10.5)
CHLORIDE SERPL-SCNC: 103 MMOL/L (ref 98–107)
CO2 SERPL-SCNC: 27.1 MMOL/L (ref 22–29)
CREAT BLD-MCNC: 0.95 MG/DL (ref 0.57–1)
DEPRECATED RDW RBC AUTO: 49.5 FL (ref 37–54)
EOSINOPHIL # BLD AUTO: 0.05 10*3/MM3 (ref 0–0.7)
EOSINOPHIL NFR BLD AUTO: 1.2 % (ref 0.3–6.2)
ERYTHROCYTE [DISTWIDTH] IN BLOOD BY AUTOMATED COUNT: 15.6 % (ref 11.7–13)
GFR SERPL CREATININE-BSD FRML MDRD: 58 ML/MIN/1.73
GLOBULIN UR ELPH-MCNC: 2.7 GM/DL
GLUCOSE BLD-MCNC: 115 MG/DL (ref 65–99)
HCT VFR BLD AUTO: 34.7 % (ref 35.6–45.5)
HGB BLD-MCNC: 11.1 G/DL (ref 11.9–15.5)
IMM GRANULOCYTES # BLD: 0 10*3/MM3 (ref 0–0.03)
IMM GRANULOCYTES NFR BLD: 0 % (ref 0–0.5)
LYMPHOCYTES # BLD AUTO: 1.93 10*3/MM3 (ref 0.9–4.8)
LYMPHOCYTES NFR BLD AUTO: 46.6 % (ref 19.6–45.3)
MCH RBC QN AUTO: 27.6 PG (ref 26.9–32)
MCHC RBC AUTO-ENTMCNC: 32 G/DL (ref 32.4–36.3)
MCV RBC AUTO: 86.3 FL (ref 80.5–98.2)
MONOCYTES # BLD AUTO: 0.31 10*3/MM3 (ref 0.2–1.2)
MONOCYTES NFR BLD AUTO: 7.5 % (ref 5–12)
NEUTROPHILS # BLD AUTO: 1.84 10*3/MM3 (ref 1.9–8.1)
NEUTROPHILS NFR BLD AUTO: 44.5 % (ref 42.7–76)
PLATELET # BLD AUTO: 212 10*3/MM3 (ref 140–500)
PMV BLD AUTO: 10.2 FL (ref 6–12)
POTASSIUM BLD-SCNC: 3.8 MMOL/L (ref 3.5–5.2)
PROT SERPL-MCNC: 6.7 G/DL (ref 6–8.5)
RBC # BLD AUTO: 4.02 10*6/MM3 (ref 3.9–5.2)
SODIUM BLD-SCNC: 142 MMOL/L (ref 136–145)
TROPONIN T SERPL-MCNC: <0.01 NG/ML (ref 0–0.03)
WBC NRBC COR # BLD: 4.14 10*3/MM3 (ref 4.5–10.7)

## 2018-11-22 PROCEDURE — 99283 EMERGENCY DEPT VISIT LOW MDM: CPT

## 2018-11-22 PROCEDURE — 81001 URINALYSIS AUTO W/SCOPE: CPT | Performed by: EMERGENCY MEDICINE

## 2018-11-22 PROCEDURE — 96360 HYDRATION IV INFUSION INIT: CPT

## 2018-11-22 PROCEDURE — 85025 COMPLETE CBC W/AUTO DIFF WBC: CPT | Performed by: EMERGENCY MEDICINE

## 2018-11-22 PROCEDURE — 80053 COMPREHEN METABOLIC PANEL: CPT | Performed by: EMERGENCY MEDICINE

## 2018-11-22 PROCEDURE — 70450 CT HEAD/BRAIN W/O DYE: CPT

## 2018-11-22 PROCEDURE — 84484 ASSAY OF TROPONIN QUANT: CPT | Performed by: EMERGENCY MEDICINE

## 2018-11-22 PROCEDURE — 36415 COLL VENOUS BLD VENIPUNCTURE: CPT

## 2018-11-22 RX ORDER — MECLIZINE HYDROCHLORIDE 25 MG/1
25 TABLET ORAL ONCE
Status: DISCONTINUED | OUTPATIENT
Start: 2018-11-22 | End: 2018-11-22

## 2018-11-22 RX ORDER — SODIUM CHLORIDE 0.9 % (FLUSH) 0.9 %
10 SYRINGE (ML) INJECTION AS NEEDED
Status: DISCONTINUED | OUTPATIENT
Start: 2018-11-22 | End: 2018-11-23 | Stop reason: HOSPADM

## 2018-11-22 RX ORDER — LORAZEPAM 2 MG/ML
0.5 INJECTION INTRAMUSCULAR ONCE
Status: DISCONTINUED | OUTPATIENT
Start: 2018-11-22 | End: 2018-11-23

## 2018-11-22 RX ORDER — DIAZEPAM 5 MG/1
5 TABLET ORAL ONCE
Status: DISCONTINUED | OUTPATIENT
Start: 2018-11-22 | End: 2018-11-23 | Stop reason: HOSPADM

## 2018-11-22 RX ADMIN — SODIUM CHLORIDE 1000 ML: 9 INJECTION, SOLUTION INTRAVENOUS at 23:58

## 2018-11-23 VITALS
RESPIRATION RATE: 15 BRPM | TEMPERATURE: 97.6 F | OXYGEN SATURATION: 99 % | WEIGHT: 130 LBS | HEART RATE: 73 BPM | HEIGHT: 63 IN | DIASTOLIC BLOOD PRESSURE: 88 MMHG | BODY MASS INDEX: 23.04 KG/M2 | SYSTOLIC BLOOD PRESSURE: 138 MMHG

## 2018-11-23 LAB
BACTERIA UR QL AUTO: NORMAL /HPF
BILIRUB UR QL STRIP: NEGATIVE
CLARITY UR: CLEAR
COLOR UR: YELLOW
GLUCOSE UR STRIP-MCNC: NEGATIVE MG/DL
HGB UR QL STRIP.AUTO: NEGATIVE
HYALINE CASTS UR QL AUTO: NORMAL /LPF
KETONES UR QL STRIP: NEGATIVE
LEUKOCYTE ESTERASE UR QL STRIP.AUTO: ABNORMAL
NITRITE UR QL STRIP: NEGATIVE
PH UR STRIP.AUTO: 7 [PH] (ref 5–8)
PROT UR QL STRIP: NEGATIVE
RBC # UR: NORMAL /HPF
REF LAB TEST METHOD: NORMAL
SP GR UR STRIP: 1.01 (ref 1–1.03)
SQUAMOUS #/AREA URNS HPF: NORMAL /HPF
UROBILINOGEN UR QL STRIP: ABNORMAL
WBC UR QL AUTO: NORMAL /HPF

## 2018-11-23 RX ORDER — MECLIZINE HYDROCHLORIDE 25 MG/1
25 TABLET ORAL ONCE
Status: DISCONTINUED | OUTPATIENT
Start: 2018-11-23 | End: 2018-11-23 | Stop reason: HOSPADM

## 2018-11-23 NOTE — ED PROVIDER NOTES
EMERGENCY DEPARTMENT ENCOUNTER    CHIEF COMPLAINT  Chief Complaint: Dizziness   History given by: patient   History limited by: n/a  Room Number: 20/20  PMD: Julian Garg MD      HPI:  Pt is a 68 y.o. female who presents complaining of dizziness that was present upon waking from sleep tonight. Pt describes the dizziness as a room spinning sensation, and states that it is present with standing and improves with rest. Pt also complains of nausea, but denies vomiting, weakness, difficulty speaking, or vision changes. She reports a hx of vertigo, but states that it has been several years since she had sx.     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   • Cystocele 02/17/2015   • Epiphora due to insufficient drainage of right side 01/18/2016   • Incomplete emptying of bladder 02/17/2015   • OAB (overactive bladder) 02/13/2015   • Senile cataracts of both eyes 01/18/2016   • Uterovaginal prolapse, incomplete 02/28/2015   • Hypothyroidism 07/16/2018   • Hyperlipidemia 07/16/2018   • Prediabetes 07/16/2018   • Leg cramps 07/16/2018     Resolved Ambulatory Problems     Diagnosis Date Noted   • No Resolved Ambulatory Problems     Past Medical History:   Diagnosis Date   • Dizziness    • Fibrocystic breast    • GERD (gastroesophageal reflux disease)    • Hyperlipidemia    • Hypothyroidism    • PAD (peripheral artery disease) (CMS/[facility])    • Seizures (CMS/[facility])    • Syncopal episodes        PAST SURGICAL HISTORY  Past Surgical History:   Procedure Laterality Date   • BLADDER SURGERY  2015    bladder lift   • CERVICAL CONE BIOPSY  [id number]   •  COLONOSCOPY  02/01/2014   • EYE SURGERY Right 01/19/2016    TEAR DUCT SURGERY/PARTIAL REMOVAL OF NASAL SINUS   • EYE SURGERY Left 05/13/2011    TEAR DUCT SURGERY/DACRYOCYSTIRTHINOSTOMY AND REMOVAL OF ETHMOID SINUS   • HYSTERECTOMY  03/25/2015    UTEROSACRAL SUSPENSION AND HYSTERECTOMY   • OOPHORECTOMY     • PAP SMEAR  01/18/2018   • VOCAL CORD BIOPSY  08/12/2009       FAMILY HISTORY  Family History   Problem Relation Age of Onset   • Diabetes Maternal Grandmother    • Heart failure Mother    • Thyroid disease Mother    • Lung disease Brother    • Alcohol abuse Maternal Aunt    • Cancer Maternal Aunt    • Alcohol abuse Maternal Uncle    • Cancer Maternal Uncle    • Diabetes Maternal Uncle        SOCIAL HISTORY  Social History     Socioeconomic History   • Marital status:      Spouse name: Not on file   • Number of children: 1   • Years of education: Not on file   • Highest education level: Not on file   Social Needs   • Financial resource strain: Not on file   • Food insecurity - worry: Not on file   • Food insecurity - inability: Not on file   • Transportation needs - medical: Not on file   • Transportation needs - non-medical: Not on file   Occupational History   • Occupation: [employer]   Tobacco Use   • Smoking status: Never Smoker   • Smokeless tobacco: Never Used   Substance and Sexual Activity   • Alcohol use: Yes     Comment: 2-4/MONTH   • Drug use: No   • Sexual activity: No   Other Topics Concern   • Not on file   Social History Narrative   • Not on file       ALLERGIES  Other    REVIEW OF SYSTEMS  Review of Systems   Constitutional: Negative for fever.   HENT: Negative for sore throat.    Eyes: Negative.    Respiratory: Negative for cough and shortness of breath.    Cardiovascular: Negative for chest pain.   Gastrointestinal: Negative for abdominal pain, diarrhea and vomiting.   Genitourinary: Negative for dysuria.   Musculoskeletal: Negative for neck pain.   Skin: Negative for rash.    Allergic/Immunologic: Negative.    Neurological: Positive for dizziness. Negative for weakness, numbness and headaches.   Hematological: Negative.    Psychiatric/Behavioral: Negative.    All other systems reviewed and are negative.      PHYSICAL EXAM  ED Triage Vitals [11/22/18 2242]   Temp Heart Rate Resp BP SpO2   97.6 °F (36.4 °C) 84 16 152/76 100 %      Temp src Heart Rate Source Patient Position BP Location FiO2 (%)   Tympanic Monitor -- -- --       Physical Exam   Constitutional: She is oriented to person, place, and time. No distress.   HENT:   Head: Normocephalic and atraumatic.   Eyes: EOM are normal. Pupils are equal, round, and reactive to light.   Neck: Normal range of motion. Neck supple.   Cardiovascular: Normal rate, regular rhythm and normal heart sounds.   Pulmonary/Chest: Effort normal and breath sounds normal. No respiratory distress.   Abdominal: Soft. There is no tenderness. There is no rebound and no guarding.   Musculoskeletal: Normal range of motion. She exhibits no edema.   Neurological: She is alert and oriented to person, place, and time. She has normal sensation and normal strength.   NIHSS=0  Worsening sx with change in bed positions.   Skin: Skin is warm and dry. No rash noted.   Psychiatric: Mood and affect normal.   Nursing note and vitals reviewed.      LAB RESULTS  Lab Results (last 24 hours)     Procedure Component Value Units Date/Time    CBC & Differential [[id number]] Collected:  11/22/18 2307    Specimen:  Blood Updated:  11/22/18 2319    Narrative:       The following orders were created for panel order CBC & Differential.  Procedure                               Abnormality         Status                     ---------                               -----------         ------                     CBC Auto Differential[[id number]]        Abnormal            Final result                 Please view results for these tests on the individual orders.    Comprehensive Metabolic Panel  [[id number]]  (Abnormal) Collected:  11/22/18 2307    Specimen:  Blood Updated:  11/22/18 2341     Glucose 115 mg/dL      BUN 16 mg/dL      Creatinine 0.95 mg/dL      Sodium 142 mmol/L      Potassium 3.8 mmol/L      Chloride 103 mmol/L      CO2 27.1 mmol/L      Calcium 9.3 mg/dL      Total Protein 6.7 g/dL      Albumin 4.00 g/dL      ALT (SGPT) 12 U/L      AST (SGOT) 24 U/L      Alkaline Phosphatase 57 U/L      Total Bilirubin 0.3 mg/dL      eGFR Non African Amer 58 mL/min/1.73      Globulin 2.7 gm/dL      A/G Ratio 1.5 g/dL      BUN/Creatinine Ratio 16.8     Anion Gap 11.9 mmol/L     Troponin [[id number]]  (Normal) Collected:  11/22/18 2307    Specimen:  Blood Updated:  11/22/18 2341     Troponin T <0.010 ng/mL     Narrative:       Troponin T Reference Ranges:  Less than 0.03 ng/mL:    Negative for AMI  0.03 to 0.09 ng/mL:      Indeterminant for AMI  Greater than 0.09 ng/mL: Positive for AMI    CBC Auto Differential [[id number]]  (Abnormal) Collected:  11/22/18 2307    Specimen:  Blood Updated:  11/22/18 2319     WBC 4.14 10*3/mm3      RBC 4.02 10*6/mm3      Hemoglobin 11.1 g/dL      Hematocrit 34.7 %      MCV 86.3 fL      MCH 27.6 pg      MCHC 32.0 g/dL      RDW 15.6 %      RDW-SD 49.5 fl      MPV 10.2 fL      Platelets 212 10*3/mm3      Neutrophil % 44.5 %      Lymphocyte % 46.6 %      Monocyte % 7.5 %      Eosinophil % 1.2 %      Basophil % 0.2 %      Immature Grans % 0.0 %      Neutrophils, Absolute 1.84 10*3/mm3      Lymphocytes, Absolute 1.93 10*3/mm3      Monocytes, Absolute 0.31 10*3/mm3      Eosinophils, Absolute 0.05 10*3/mm3      Basophils, Absolute 0.01 10*3/mm3      Immature Grans, Absolute 0.00 10*3/mm3     Urinalysis With Microscopic If Indicated (No Culture) - Urine, Clean Catch [[id number]]  (Abnormal) Collected:  11/22/18 2358    Specimen:  Urine, Clean Catch Updated:  11/23/18 0016     Color, UA Yellow     Appearance, UA Clear     pH, UA 7.0     Specific Gravity, UA 1.011     Glucose, UA Negative      Ketones, UA Negative     Bilirubin, UA Negative     Blood, UA Negative     Protein, UA Negative     Leuk Esterase, UA Small (1+)     Nitrite, UA Negative     Urobilinogen, UA 0.2 E.U./dL    Urinalysis, Microscopic Only - Urine, Clean Catch [[id number]] Collected:  11/22/18 9892    Specimen:  Urine, Clean Catch Updated:  11/23/18 0016     RBC, UA 0-2 /HPF      WBC, UA 0-2 /HPF      Bacteria, UA None Seen /HPF      Squamous Epithelial Cells, UA 0-2 /HPF      Hyaline Casts, UA None Seen /LPF      Methodology Automated Microscopy          I ordered the above labs and reviewed the results    RADIOLOGY  CT Head Without Contrast   Final Result   1. No acute intracranial abnormality.                           This report was finalized on 11/22/2018 11:57 PM by Dustin Loo M.D.               I ordered the above noted radiological studies. Interpreted by radiologist. Reviewed by me in PACS.       PROCEDURES  Procedures      PROGRESS AND CONSULTS       22:49  Troponin, CMP, CBC, and UA ordered     23:10  BP- 122/75 HR- 65 Temp- 97.6 °F (36.4 °C) (Tympanic) O2 sat- 98%  Informed pt of the plan for labs and CT head. Will treat dizziness. Pt understands and agrees with the plan, all questions answered.    23:21  Valium and Meclizine  ordered to treat dizziness. IVF ordered for hydration. CT head ordered.      00:13  Pt refused Valium and Meclizine. Pt prefers to try the Epley maneuver.     00:35  BP- 129/88 HR- 77 Temp- 97.6 °F (36.4 °C) (Tympanic) O2 sat- 98%  Rechecked the patient who is in NAD and is resting comfortably. Informed pt that the workup in the ED shows NAD. [place] Halpike maneuver performed without producing nystagmus or dizziness. Plan for discharge. Pt understands and agrees with the plan, all questions answered.      MEDICAL DECISION MAKING  Results were reviewed/discussed with the patient and they were also made aware of online access. Pt also made aware that some labs, such as cultures, will not be resulted  during ER visit and follow up with PMD is necessary.     MDM  Number of Diagnoses or Management Options     Amount and/or Complexity of Data Reviewed  Clinical lab tests: reviewed and ordered (Troponin is <0.010)  Tests in the radiology section of CPT®: reviewed and ordered (CT head shows NAD)  Decide to obtain previous medical records or to obtain history from someone other than the patient: yes  Review and summarize past medical records: yes (Pt was seen by her PMD on 9/28/17 s/t vertigo. )    Patient Progress  Patient progress: stable       IV tPA considered but not indicated due to NIHSS of 0    DIAGNOSIS  Final diagnoses:   Benign paroxysmal positional vertigo, unspecified laterality       DISPOSITION  DISCHARGE    Patient discharged in stable condition.    Reviewed implications of results, diagnosis, meds, responsibility to follow up, warning signs and symptoms of possible worsening, potential complications and reasons to return to ER.    Patient/Family voiced understanding of above instructions.    Discussed plan for discharge, as there is no emergent indication for admission. Patient referred to primary care provider for BP management due to today's BP. Pt/family is agreeable and understands need for follow up and repeat testing.  Pt is aware that discharge does not mean that nothing is wrong but it indicates no emergency is present that requires admission and they must continue care with follow-up as given below or physician of their choice.     FOLLOW-UP  Julian Garg MD  [id number] [id number] [phone]    Schedule an appointment as soon as possible for a visit            Medication List      Changed    omeprazole 20 MG capsule  Commonly known as:  priLOSEC  Take 1 capsule by mouth Daily.  What changed:  additional instructions          Latest Documented Vital Signs:  As of 8:35 PM  BP- 138/88 HR- 73 Temp- 97.6 °F (36.4 °C) (Tympanic) O2 sat- 99%    --  Documentation  assistance provided by chucky Álvarez for Dr Frias.  Information recorded by the scribe was done at my direction and has been verified and validated by me.       Misti Álvarez  11/23/18 0052       Chau Frias MD  11/23/18 2032

## 2018-12-04 ENCOUNTER — OFFICE VISIT (OUTPATIENT)
Dept: INTERNAL MEDICINE | Facility: CLINIC | Age: 68
End: 2018-12-04

## 2018-12-04 VITALS
WEIGHT: 127.1 LBS | SYSTOLIC BLOOD PRESSURE: 102 MMHG | HEART RATE: 67 BPM | DIASTOLIC BLOOD PRESSURE: 68 MMHG | OXYGEN SATURATION: 96 % | RESPIRATION RATE: 16 BRPM | HEIGHT: 63 IN | BODY MASS INDEX: 22.52 KG/M2

## 2018-12-04 DIAGNOSIS — E03.9 HYPOTHYROIDISM, UNSPECIFIED TYPE: ICD-10-CM

## 2018-12-04 DIAGNOSIS — R42 VERTIGO: Primary | ICD-10-CM

## 2018-12-04 DIAGNOSIS — R42 DIZZINESS: ICD-10-CM

## 2018-12-04 PROCEDURE — 99214 OFFICE O/P EST MOD 30 MIN: CPT | Performed by: FAMILY MEDICINE

## 2018-12-04 RX ORDER — FLUTICASONE PROPIONATE 50 MCG
1 SPRAY, SUSPENSION (ML) NASAL
COMMUNITY
Start: 2015-06-13 | End: 2018-12-04

## 2018-12-04 RX ORDER — PSEUDOEPHEDRINE HCL 30 MG
100 TABLET ORAL
COMMUNITY
Start: 2015-04-21 | End: 2018-12-04

## 2018-12-04 RX ORDER — HYDROCODONE BITARTRATE AND ACETAMINOPHEN 7.5; 325 MG/1; MG/1
TABLET ORAL
COMMUNITY
Start: 2017-11-12 | End: 2018-12-04

## 2018-12-04 RX ORDER — CHLORAL HYDRATE 500 MG
CAPSULE ORAL
COMMUNITY
End: 2018-12-04

## 2018-12-04 RX ORDER — PROMETHAZINE HYDROCHLORIDE 25 MG/1
25 TABLET ORAL
COMMUNITY
Start: 2015-03-27 | End: 2018-12-04

## 2018-12-04 RX ORDER — ONDANSETRON HYDROCHLORIDE 8 MG/1
8 TABLET, FILM COATED ORAL
COMMUNITY
Start: 2015-03-27 | End: 2018-12-04

## 2018-12-04 RX ORDER — IBUPROFEN 200 MG
600 TABLET ORAL EVERY 8 HOURS PRN
COMMUNITY
Start: 2015-03-26 | End: 2019-07-08

## 2018-12-04 NOTE — PROGRESS NOTES
Subjective   Penny Brown is a 68 y.o. female.     Chief Complaint   Patient presents with   • HOSPITAL FOLLOW UP     at ER 11/22/18 for vertigo. still having it now         History of Present Illness     Patient seemed to emergency room due to vertigo and dizziness.  Patient's blood pressure was found to be elevated that time.  Patient's unsure of the cause.  Today's office visit blood pressures 102/68.  She states that the vertigo and dizziness has improved significantly.  Patient states that she is doing much better.    Patient does have a past medical history for hypothyroidism, and would like to get evaluated, and is currently taking levothyroxine 25 µg daily.  Denies any side effects of the medication.  She's unsure if her hypothyroidism is causing her symptoms.      The following portions of the patient's history were reviewed and updated as appropriate: allergies, current medications, past family history, past medical history, past social history, past surgical history and problem list.    Review of Systems   Constitutional: Negative for chills and fever.   HENT: Negative for congestion, rhinorrhea, sinus pain and sore throat.    Eyes: Negative for photophobia and visual disturbance.   Respiratory: Negative for cough, chest tightness and shortness of breath.    Cardiovascular: Negative for chest pain and palpitations.   Gastrointestinal: Negative for diarrhea, nausea and vomiting.   Genitourinary: Negative for dysuria, frequency and urgency.   Skin: Negative for rash and wound.   Neurological: Positive for dizziness. Negative for syncope.        Patient dizziness is mild.   Psychiatric/Behavioral: Negative for behavioral problems and confusion.       Objective   Physical Exam   Constitutional: She is oriented to person, place, and time. She appears well-developed and well-nourished.   HENT:   Head: Normocephalic and atraumatic.   Right Ear: External ear normal.   Left Ear: External ear normal.   Eyes:  EOM are normal.   Neck: Normal range of motion. Neck supple.   Cardiovascular: Normal rate, regular rhythm and normal heart sounds.   Pulmonary/Chest: Effort normal and breath sounds normal. No respiratory distress.   Musculoskeletal: Normal range of motion.   Lymphadenopathy:     She has no cervical adenopathy.   Neurological: She is alert and oriented to person, place, and time.   Skin: Skin is warm.   Psychiatric: She has a normal mood and affect. Her behavior is normal.   Nursing note and vitals reviewed.      Assessment/Plan   Penny was seen today for hospital follow up.    Diagnoses and all orders for this visit:    Vertigo        -      Currently stable.  Patient dizziness has mostly resolved.    Dizziness        -      Currently stable.  Patient dizziness has mostly resolved.    Hypothyroidism, unspecified type  -     Thyroid Panel With TSH  -     Continue levothyroxine 25 mg daily.          No Follow-up on file.    Dictated utilizing Dragon Voice Recognition Software

## 2018-12-05 LAB
FT4I SERPL CALC-MCNC: 2.2 (ref 1.2–4.9)
T3RU NFR SERPL: 26 % (ref 24–39)
T4 SERPL-MCNC: 8.3 UG/DL (ref 4.5–12)
TSH SERPL DL<=0.005 MIU/L-ACNC: 3.25 UIU/ML (ref 0.45–4.5)

## 2018-12-06 ENCOUNTER — TELEPHONE (OUTPATIENT)
Dept: INTERNAL MEDICINE | Facility: CLINIC | Age: 68
End: 2018-12-06

## 2018-12-06 NOTE — TELEPHONE ENCOUNTER
----- Message from Julian Garg MD sent at 12/5/2018  9:40 AM EST -----  The labs were reviewed. Please inform patient that labs were normal.

## 2019-01-14 ENCOUNTER — OFFICE VISIT (OUTPATIENT)
Dept: INTERNAL MEDICINE | Facility: CLINIC | Age: 69
End: 2019-01-14

## 2019-01-14 VITALS
BODY MASS INDEX: 22.71 KG/M2 | OXYGEN SATURATION: 99 % | HEIGHT: 63 IN | HEART RATE: 77 BPM | DIASTOLIC BLOOD PRESSURE: 60 MMHG | SYSTOLIC BLOOD PRESSURE: 100 MMHG | WEIGHT: 128.2 LBS

## 2019-01-14 DIAGNOSIS — R35.0 URINARY FREQUENCY: Primary | ICD-10-CM

## 2019-01-14 LAB
BILIRUB BLD-MCNC: NEGATIVE MG/DL
CLARITY, POC: CLEAR
COLOR UR: YELLOW
GLUCOSE UR STRIP-MCNC: NEGATIVE MG/DL
KETONES UR QL: NEGATIVE
LEUKOCYTE EST, POC: NEGATIVE
NITRITE UR-MCNC: NEGATIVE MG/ML
PH UR: 6 [PH] (ref 5–8)
PROT UR STRIP-MCNC: NEGATIVE MG/DL
RBC # UR STRIP: ABNORMAL /UL
SP GR UR: 1.02 (ref 1–1.03)
UROBILINOGEN UR QL: NORMAL

## 2019-01-14 PROCEDURE — 81003 URINALYSIS AUTO W/O SCOPE: CPT | Performed by: FAMILY MEDICINE

## 2019-01-14 PROCEDURE — 99213 OFFICE O/P EST LOW 20 MIN: CPT | Performed by: FAMILY MEDICINE

## 2019-01-14 NOTE — PROGRESS NOTES
Subjective   Penny Brown is a 68 y.o. female.     Chief Complaint   Patient presents with   • UTI Symptoms         Urinary Tract Infection    This is a new problem. The current episode started in the past 7 days. The problem occurs intermittently. The problem has been unchanged. The quality of the pain is described as burning. The pain is at a severity of 3/10. The pain is mild. There has been no fever. Associated symptoms include frequency. Pertinent negatives include no chills, discharge, flank pain, hematuria, hesitancy, sweats or urgency. She has tried increased fluids for the symptoms. The treatment provided significant relief. Her past medical history is significant for a urological procedure.        The following portions of the patient's history were reviewed and updated as appropriate: allergies, current medications, past family history, past medical history, past social history, past surgical history and problem list.    Review of Systems   Constitutional: Negative for chills.   HENT: Negative.    Respiratory: Negative.    Cardiovascular: Negative.    Gastrointestinal: Negative.    Genitourinary: Positive for frequency. Negative for flank pain, hematuria, hesitancy and urgency.       Objective   Physical Exam   Constitutional: She is oriented to person, place, and time. She appears well-developed and well-nourished.   HENT:   Head: Normocephalic and atraumatic.   Eyes: EOM are normal.   Neck: Normal range of motion. Neck supple.   Cardiovascular: Normal rate, regular rhythm and normal heart sounds.   Pulmonary/Chest: Effort normal and breath sounds normal. No respiratory distress.   Neurological: She is alert and oriented to person, place, and time.   Psychiatric: She has a normal mood and affect. Her behavior is normal.   Nursing note and vitals reviewed.      Assessment/Plan   Penny was seen today for uti symptoms.    Diagnoses and all orders for this visit:    Urinary frequency  -     POCT  urinalysis dipstick, automated  -     Urinanalysis is normal except 1+ blood. Repeat UA when going to gyn next week.  Symptomatic treatment with increasing more fluids.          No Follow-up on file.    Dictated utilizing Dragon Voice Recognition Software

## 2019-01-31 ENCOUNTER — TRANSCRIBE ORDERS (OUTPATIENT)
Dept: ADMINISTRATIVE | Facility: HOSPITAL | Age: 69
End: 2019-01-31

## 2019-01-31 DIAGNOSIS — E07.9 THYROID DISEASE: ICD-10-CM

## 2019-01-31 DIAGNOSIS — E78.5 HYPERLIPIDEMIA, UNSPECIFIED HYPERLIPIDEMIA TYPE: ICD-10-CM

## 2019-01-31 DIAGNOSIS — N63.0 BREAST LUMP: Primary | ICD-10-CM

## 2019-01-31 RX ORDER — LEVOTHYROXINE SODIUM 0.03 MG/1
25 TABLET ORAL DAILY
Qty: 90 TABLET | Refills: 1 | Status: SHIPPED | OUTPATIENT
Start: 2019-01-31 | End: 2019-08-01 | Stop reason: SDUPTHER

## 2019-01-31 RX ORDER — PRAVASTATIN SODIUM 40 MG
40 TABLET ORAL DAILY
Qty: 90 TABLET | Refills: 1 | Status: SHIPPED | OUTPATIENT
Start: 2019-01-31 | End: 2019-09-06 | Stop reason: SDUPTHER

## 2019-01-31 RX ORDER — PRAVASTATIN SODIUM 40 MG
TABLET ORAL
Qty: 90 TABLET | Refills: 0 | Status: CANCELLED | OUTPATIENT
Start: 2019-01-31

## 2019-02-07 ENCOUNTER — HOSPITAL ENCOUNTER (OUTPATIENT)
Dept: ULTRASOUND IMAGING | Facility: HOSPITAL | Age: 69
Discharge: HOME | End: 2019-02-07
Admitting: NURSE PRACTITIONER

## 2019-02-07 DIAGNOSIS — N63.0 BREAST LUMP: ICD-10-CM

## 2019-02-07 PROCEDURE — 76642 ULTRASOUND BREAST LIMITED: CPT

## 2019-07-08 ENCOUNTER — OFFICE VISIT (OUTPATIENT)
Dept: INTERNAL MEDICINE | Facility: CLINIC | Age: 69
End: 2019-07-08

## 2019-07-08 VITALS
OXYGEN SATURATION: 98 % | WEIGHT: 127.2 LBS | HEART RATE: 64 BPM | DIASTOLIC BLOOD PRESSURE: 50 MMHG | SYSTOLIC BLOOD PRESSURE: 82 MMHG | BODY MASS INDEX: 22.54 KG/M2 | HEIGHT: 63 IN

## 2019-07-08 DIAGNOSIS — E78.5 HYPERLIPIDEMIA, UNSPECIFIED HYPERLIPIDEMIA TYPE: Primary | ICD-10-CM

## 2019-07-08 DIAGNOSIS — R73.03 PREDIABETES: ICD-10-CM

## 2019-07-08 DIAGNOSIS — E03.9 HYPOTHYROIDISM, UNSPECIFIED TYPE: ICD-10-CM

## 2019-07-08 PROCEDURE — 99214 OFFICE O/P EST MOD 30 MIN: CPT | Performed by: FAMILY MEDICINE

## 2019-07-08 RX ORDER — ESTRADIOL 0.1 MG/G
1 CREAM VAGINAL AS NEEDED
COMMUNITY
Start: 2019-01-21 | End: 2022-01-04

## 2019-07-08 NOTE — PROGRESS NOTES
Subjective   Penny Brown is a 68 y.o. female.     Chief Complaint   Patient presents with   • Hypothyroidism   • Hyperlipidemia   • Prediabetes         History of Present Illness     Patient is a past medical history for hyperlipidemia.  Patient is currently on pravastatin 40 mg daily.  Patient denies any side effects of the medication.    Patient also has a past medical history for hypothyroidism.  Patient is currently on levothyroxine 25 mcg daily.  Patient denies any side effects of the medication.    Patient also has a past medical history of prediabetes.  Patient is monitoring with diet and exercise.  Patient states that her diet has improved significantly.  She notes that she is trying to eat healthy.    The following portions of the patient's history were reviewed and updated as appropriate: allergies, current medications, past family history, past medical history, past social history, past surgical history and problem list.    Review of Systems   Constitutional: Negative for chills and fever.   HENT: Negative for congestion, rhinorrhea, sinus pain and sore throat.    Eyes: Negative for photophobia and visual disturbance.   Respiratory: Negative for cough, chest tightness and shortness of breath.    Cardiovascular: Negative for chest pain and palpitations.   Gastrointestinal: Negative for diarrhea, nausea and vomiting.   Genitourinary: Negative for dysuria, frequency and urgency.   Skin: Negative for rash and wound.   Neurological: Negative for dizziness and syncope.   Psychiatric/Behavioral: Negative for behavioral problems and confusion.       Objective   Physical Exam   Constitutional: She is oriented to person, place, and time. She appears well-developed and well-nourished.   HENT:   Head: Normocephalic and atraumatic.   Right Ear: External ear normal.   Left Ear: External ear normal.   Eyes: EOM are normal.   Neck: Normal range of motion. Neck supple.   Cardiovascular: Normal rate, regular rhythm  and normal heart sounds.   Pulmonary/Chest: Effort normal and breath sounds normal. No respiratory distress.   Musculoskeletal: Normal range of motion.   Lymphadenopathy:     She has no cervical adenopathy.   Neurological: She is alert and oriented to person, place, and time.   Skin: Skin is warm.   Psychiatric: She has a normal mood and affect. Her behavior is normal.   Nursing note and vitals reviewed.      Assessment/Plan   Penny was seen today for hypothyroidism, hyperlipidemia and prediabetes.    Diagnoses and all orders for this visit:    Hyperlipidemia, unspecified hyperlipidemia type  -     Lipid Panel With LDL / HDL Ratio  -     Comprehensive Metabolic Panel  -     We will continue pravastatin 40 mg daily.    Hypothyroidism, unspecified type  -     Thyroid Panel With TSH  -     We will continue levothyroxine 25 mcg daily.    Prediabetes  -     Hemoglobin A1c  -     Comprehensive Metabolic Panel  -     Continue lifestyle changes.          No Follow-up on file.    Dictated utilizing Dragon Voice Recognition Software

## 2019-07-09 LAB
ALBUMIN SERPL-MCNC: 4.5 G/DL (ref 3.5–5.2)
ALBUMIN/GLOB SERPL: 1.9 G/DL
ALP SERPL-CCNC: 61 U/L (ref 39–117)
ALT SERPL-CCNC: 14 U/L (ref 1–33)
AST SERPL-CCNC: 24 U/L (ref 1–32)
BILIRUB SERPL-MCNC: 0.7 MG/DL (ref 0.2–1.2)
BUN SERPL-MCNC: 15 MG/DL (ref 8–23)
BUN/CREAT SERPL: 17.6 (ref 7–25)
CALCIUM SERPL-MCNC: 9.4 MG/DL (ref 8.6–10.5)
CHLORIDE SERPL-SCNC: 101 MMOL/L (ref 98–107)
CHOLEST SERPL-MCNC: 228 MG/DL (ref 0–200)
CO2 SERPL-SCNC: 27.5 MMOL/L (ref 22–29)
CREAT SERPL-MCNC: 0.85 MG/DL (ref 0.57–1)
FT4I SERPL CALC-MCNC: 2 (ref 1.2–4.9)
GLOBULIN SER CALC-MCNC: 2.4 GM/DL
GLUCOSE SERPL-MCNC: 94 MG/DL (ref 65–99)
HBA1C MFR BLD: 5.9 % (ref 4.8–5.6)
HDLC SERPL-MCNC: 71 MG/DL (ref 40–60)
LDLC SERPL CALC-MCNC: 137 MG/DL (ref 0–100)
LDLC/HDLC SERPL: 1.93 {RATIO}
POTASSIUM SERPL-SCNC: 4.4 MMOL/L (ref 3.5–5.2)
PROT SERPL-MCNC: 6.9 G/DL (ref 6–8.5)
SODIUM SERPL-SCNC: 140 MMOL/L (ref 136–145)
T3RU NFR SERPL: 25 % (ref 24–39)
T4 SERPL-MCNC: 8.1 UG/DL (ref 4.5–12)
TRIGL SERPL-MCNC: 101 MG/DL (ref 0–150)
TSH SERPL DL<=0.005 MIU/L-ACNC: 2.48 UIU/ML (ref 0.45–4.5)
VLDLC SERPL CALC-MCNC: 20.2 MG/DL

## 2019-07-11 NOTE — PROGRESS NOTES
Please inform the patient of the following abnormal results.  Prediabetic.  Lipid panel still elevated, and patient needs to diet and exercise.

## 2019-07-17 ENCOUNTER — TRANSCRIBE ORDERS (OUTPATIENT)
Dept: ADMINISTRATIVE | Facility: HOSPITAL | Age: 69
End: 2019-07-17

## 2019-07-17 DIAGNOSIS — Z12.31 VISIT FOR SCREENING MAMMOGRAM: Primary | ICD-10-CM

## 2019-08-01 DIAGNOSIS — E07.9 THYROID DISEASE: ICD-10-CM

## 2019-08-01 RX ORDER — LEVOTHYROXINE SODIUM 0.03 MG/1
TABLET ORAL
Qty: 90 TABLET | Refills: 1 | Status: SHIPPED | OUTPATIENT
Start: 2019-08-01 | End: 2020-01-30

## 2019-08-27 ENCOUNTER — OFFICE VISIT (OUTPATIENT)
Dept: INTERNAL MEDICINE | Facility: CLINIC | Age: 69
End: 2019-08-27

## 2019-08-27 VITALS
BODY MASS INDEX: 22.32 KG/M2 | OXYGEN SATURATION: 98 % | SYSTOLIC BLOOD PRESSURE: 84 MMHG | HEART RATE: 65 BPM | TEMPERATURE: 98.1 F | HEIGHT: 63 IN | DIASTOLIC BLOOD PRESSURE: 54 MMHG | WEIGHT: 126 LBS

## 2019-08-27 DIAGNOSIS — Z23 NEED FOR TDAP VACCINATION: ICD-10-CM

## 2019-08-27 DIAGNOSIS — J06.9 ACUTE URI: ICD-10-CM

## 2019-08-27 DIAGNOSIS — Z11.59 ENCOUNTER FOR HEPATITIS C SCREENING TEST FOR LOW RISK PATIENT: ICD-10-CM

## 2019-08-27 DIAGNOSIS — Z23 NEED FOR VACCINATION AGAINST STREPTOCOCCUS PNEUMONIAE: ICD-10-CM

## 2019-08-27 DIAGNOSIS — Z12.39 SCREENING FOR BREAST CANCER: ICD-10-CM

## 2019-08-27 DIAGNOSIS — E78.5 HYPERLIPIDEMIA, UNSPECIFIED HYPERLIPIDEMIA TYPE: ICD-10-CM

## 2019-08-27 DIAGNOSIS — Z12.11 ENCOUNTER FOR SCREENING COLONOSCOPY: ICD-10-CM

## 2019-08-27 DIAGNOSIS — Z00.00 MEDICARE ANNUAL WELLNESS VISIT, SUBSEQUENT: Primary | ICD-10-CM

## 2019-08-27 DIAGNOSIS — R73.03 PREDIABETES: ICD-10-CM

## 2019-08-27 DIAGNOSIS — E03.9 HYPOTHYROIDISM, UNSPECIFIED TYPE: ICD-10-CM

## 2019-08-27 PROCEDURE — 90472 IMMUNIZATION ADMIN EACH ADD: CPT | Performed by: FAMILY MEDICINE

## 2019-08-27 PROCEDURE — 90715 TDAP VACCINE 7 YRS/> IM: CPT | Performed by: FAMILY MEDICINE

## 2019-08-27 PROCEDURE — G0009 ADMIN PNEUMOCOCCAL VACCINE: HCPCS | Performed by: FAMILY MEDICINE

## 2019-08-27 PROCEDURE — 90670 PCV13 VACCINE IM: CPT | Performed by: FAMILY MEDICINE

## 2019-08-27 PROCEDURE — 99213 OFFICE O/P EST LOW 20 MIN: CPT | Performed by: FAMILY MEDICINE

## 2019-08-27 PROCEDURE — G0439 PPPS, SUBSEQ VISIT: HCPCS | Performed by: FAMILY MEDICINE

## 2019-08-27 RX ORDER — AZITHROMYCIN 250 MG/1
TABLET, FILM COATED ORAL
Qty: 6 TABLET | Refills: 0 | Status: SHIPPED | OUTPATIENT
Start: 2019-08-27 | End: 2020-01-23

## 2019-08-27 NOTE — PROGRESS NOTES
The ABCs of the Annual Wellness Visit  Subsequent Medicare Wellness Visit    Chief Complaint   Patient presents with   • Cough   • Nasal Congestion       Subjective   History of Present Illness:  Penny Brown is a 68 y.o. female who presents for a Subsequent Medicare Wellness Visit.    Patient also have a cough and a sore throat that is been going on for couple weeks now.  Patient does have nasal congestion.  Patient states the cough keeps her up at night.  She denies any fevers.  She denies any particular people that she could take him in contact with the may have had similar symptoms.  Patient states that she is concerned about this.  Notes that occasionally she does feel as if she is wheezing.      HEALTH RISK ASSESSMENT    Recent Hospitalizations:  No hospitalization(s) within the last year.    Current Medical Providers:  Patient Care Team:  Julian Garg MD as PCP - General (Family Medicine)  Julian Garg MD as PCP - Claims Attributed    Smoking Status:  Social History     Tobacco Use   Smoking Status Never Smoker   Smokeless Tobacco Never Used       Alcohol Consumption:  Social History     Substance and Sexual Activity   Alcohol Use Yes   • Frequency: Monthly or less   • Drinks per session: 1 or 2    Comment: 2-4/MONTH       Depression Screen:   PHQ-2/PHQ-9 Depression Screening 8/27/2019   Little interest or pleasure in doing things 0   Feeling down, depressed, or hopeless 1   Trouble falling or staying asleep, or sleeping too much 2   Feeling tired or having little energy 0   Poor appetite or overeating 0   Feeling bad about yourself - or that you are a failure or have let yourself or your family down 0   Trouble concentrating on things, such as reading the newspaper or watching television 0   Moving or speaking so slowly that other people could have noticed. Or the opposite - being so fidgety or restless that you have been moving around a lot more than usual 0   Thoughts that you would be better  off dead, or of hurting yourself in some way 0   Total Score 3   If you checked off any problems, how difficult have these problems made it for you to do your work, take care of things at home, or get along with other people? Not difficult at all       Fall Risk Screen:  YOSEPH Fall Risk Assessment has not been completed.    Health Habits and Functional and Cognitive Screening:  Functional & Cognitive Status 8/27/2019   Do you have difficulty preparing food and eating? No   Do you have difficulty bathing yourself, getting dressed or grooming yourself? No   Do you have difficulty using the toilet? No   Do you have difficulty moving around from place to place? No   Do you have trouble with steps or getting out of a bed or a chair? No   Current Diet Well Balanced Diet   Dental Exam Up to date   Eye Exam Up to date   Exercise (times per week) 3 times per week   Current Exercise Activities Include Swimming   Do you need help using the phone?  No   Are you deaf or do you have serious difficulty hearing?  No   Do you need help with transportation? No   Do you need help shopping? No   Do you need help preparing meals?  No   Do you need help with housework?  No   Do you need help with laundry? No   Do you need help taking your medications? No   Do you need help managing money? No   Do you ever drive or ride in a car without wearing a seat belt? No   Have you felt unusual stress, anger or loneliness in the last month? No   Who do you live with? Alone   If you need help, do you have trouble finding someone available to you? No   Have you been bothered in the last four weeks by sexual problems? No   Do you have difficulty concentrating, remembering or making decisions? No         Does the patient have evidence of cognitive impairment? No    Asprin use counseling:Does not need ASA (and currently is not on it)    Age-appropriate Screening Schedule:  Refer to the list below for future screening recommendations based on patient's  age, sex and/or medical conditions. Orders for these recommended tests are listed in the plan section. The patient has been provided with a written plan.    Health Maintenance   Topic Date Due   • PNEUMOCOCCAL VACCINES (65+ LOW/MEDIUM RISK) (1 of 2 - PCV13) 09/21/2013   • TDAP/TD VACCINES (1 - Tdap) 12/10/2014   • ZOSTER VACCINE (2 of 3) 02/03/2015   • MAMMOGRAM  08/27/2019   • INFLUENZA VACCINE  08/01/2019   • PAP SMEAR  01/21/2020   • LIPID PANEL  07/08/2020   • COLONOSCOPY  02/03/2024          The following portions of the patient's history were reviewed and updated as appropriate: allergies, current medications, past family history, past medical history, past social history, past surgical history and problem list.    Outpatient Medications Prior to Visit   Medication Sig Dispense Refill   • Biotin 2500 MCG capsule Take 2 tablets by mouth Daily.     • calcium carbonate (OS-SHONNA) 600 MG tablet Take 600 mg by mouth Daily.     • Cyanocobalamin (VITAMIN B-12) 5000 MCG tablet dispersible Take 2,500 mcg by mouth Daily.     • estradiol (ESTRACE) 0.1 MG/GM vaginal cream Insert 1 applicator into the vagina As Needed.     • levothyroxine (SYNTHROID, LEVOTHROID) 25 MCG tablet TAKE 1 TABLET EVERY DAY 90 tablet 1   • pravastatin (PRAVACHOL) 40 MG tablet Take 1 tablet by mouth Daily. 90 tablet 1     No facility-administered medications prior to visit.        Patient Active Problem List   Diagnosis   • Arcus senilis of both corneas   • Cystocele   • Epiphora due to insufficient drainage of right side   • Incomplete emptying of bladder   • OAB (overactive bladder)   • Senile cataracts of both eyes   • Uterovaginal prolapse, incomplete   • Hypothyroidism   • Hyperlipidemia   • Prediabetes   • Leg cramps   • Vertigo       Advanced Care Planning:  Patient has an advance directive - a copy has been provided and is visible in patient header    Review of Systems   Constitutional: Negative for chills and fever.   HENT: Positive for  "congestion. Negative for rhinorrhea, sinus pain and sore throat.    Eyes: Negative for photophobia and visual disturbance.   Respiratory: Positive for cough and wheezing. Negative for chest tightness and shortness of breath.    Cardiovascular: Negative for chest pain and palpitations.   Gastrointestinal: Negative for diarrhea, nausea and vomiting.   Genitourinary: Negative for dysuria, frequency and urgency.   Skin: Negative for rash and wound.   Neurological: Negative for dizziness and syncope.   Psychiatric/Behavioral: Negative for behavioral problems and confusion.       Compared to one year ago, the patient feels her physical health is the same.  Compared to one year ago, the patient feels her mental health is the same.    Reviewed chart for potential of high risk medication in the elderly: yes  Reviewed chart for potential of harmful drug interactions in the elderly:yes    Objective         Vitals:    08/27/19 1017   BP: (!) 84/54   BP Location: Left arm   Pulse: 65   Temp: 98.1 °F (36.7 °C)   SpO2: 98%   Weight: 57.2 kg (126 lb)   Height: 160 cm (62.99\")       Body mass index is 22.33 kg/m².  Discussed the patient's BMI with her. The BMI is in the acceptable range.    Physical Exam   Constitutional: She is oriented to person, place, and time. She appears well-developed and well-nourished.   HENT:   Head: Normocephalic and atraumatic.   Right Ear: External ear normal.   Left Ear: External ear normal.   Eyes: EOM are normal.   Neck: Normal range of motion. Neck supple.   Cardiovascular: Normal rate, regular rhythm and normal heart sounds.   Pulmonary/Chest: Effort normal and breath sounds normal. No respiratory distress.   Musculoskeletal: Normal range of motion.   Lymphadenopathy:     She has no cervical adenopathy.   Neurological: She is alert and oriented to person, place, and time.   Skin: Skin is warm.   Psychiatric: She has a normal mood and affect. Her behavior is normal.   Nursing note and vitals " reviewed.      Lab Results   Component Value Date    GLU 94 07/08/2019    CHLPL 228 (H) 07/08/2019    TRIG 101 07/08/2019    HDL 71 (H) 07/08/2019     (H) 07/08/2019    VLDL 20.2 07/08/2019    HGBA1C 5.90 (H) 07/08/2019        Assessment/Plan   Medicare Risks and Personalized Health Plan  CMS Preventative Services Quick Reference  Breast Cancer/Mammogram Screening  Cardiovascular risk  Colon Cancer Screening  Immunizations Discussed/Encouraged (specific immunizations; adacel Tdap and Prevnar )    The above risks/problems have been discussed with the patient.  Pertinent information has been shared with the patient in the After Visit Summary.  Follow up plans and orders are seen below in the Assessment/Plan Section.    Diagnoses and all orders for this visit:    1. Medicare annual wellness visit, subsequent (Primary)    2. Acute URI  -     azithromycin (ZITHROMAX) 250 MG tablet; Take 2 tablets the first day, then 1 tablet daily for 4 days.  Dispense: 6 tablet; Refill: 0  -     Codeine Polt-Chlorphen Polt ER 14.7-2.8 MG/5ML Suspension Extended Release; Take 10 mL by mouth 2 (Two) Times a Day As Needed (COUGH).  Dispense: 120 mL; Refill: 0  -     umeclidinium-vilanterol (ANORO ELLIPTA) 62.5-25 MCG/INH aerosol powder  inhaler; Inhale 1 puff Daily.  Dispense: 1 each; Refill: 0  -     We will start patient on Z-Zander.  Start on anoro and tuzistra Xr.     3. Encounter for screening colonoscopy    4. Screening for breast cancer    5. Prediabetes  -     Hemoglobin A1c; Future  -     Comprehensive Metabolic Panel; Future    6. Hypothyroidism, unspecified type  -     Thyroid Panel With TSH; Future    7. Hyperlipidemia, unspecified hyperlipidemia type  -     CBC & Differential; Future  -     Lipid Panel With LDL / HDL Ratio; Future    8. Encounter for hepatitis C screening test for low risk patient  -     Hepatitis C Antibody    9. Need for vaccination against Streptococcus pneumoniae  -     Pneumococcal Conjugate Vaccine  13-Valent All    10. Need for Tdap vaccination  -     Tdap Vaccine Greater Than or Equal To 6yo IM      Follow Up:  No Follow-up on file.     An After Visit Summary and PPPS were given to the patient.

## 2019-08-28 ENCOUNTER — RESULTS ENCOUNTER (OUTPATIENT)
Dept: INTERNAL MEDICINE | Facility: CLINIC | Age: 69
End: 2019-08-28

## 2019-08-28 ENCOUNTER — TELEPHONE (OUTPATIENT)
Dept: INTERNAL MEDICINE | Facility: CLINIC | Age: 69
End: 2019-08-28

## 2019-08-28 DIAGNOSIS — R73.03 PREDIABETES: ICD-10-CM

## 2019-08-28 DIAGNOSIS — E03.9 HYPOTHYROIDISM, UNSPECIFIED TYPE: ICD-10-CM

## 2019-08-28 DIAGNOSIS — E78.5 HYPERLIPIDEMIA, UNSPECIFIED HYPERLIPIDEMIA TYPE: ICD-10-CM

## 2019-08-28 LAB — HCV AB S/CO SERPL IA: <0.1 S/CO RATIO (ref 0–0.9)

## 2019-08-28 NOTE — TELEPHONE ENCOUNTER
Spoke to Al[facility] and notified that patient is unable to use the coupon for the cough medication since she is on Medicare.  New prescription left on voicemail for Cheratussin AC as prescribed by Dr. Garg.

## 2019-08-28 NOTE — TELEPHONE ENCOUNTER
Is either the cough medicine which is tuzistra, and that the case, the co-pay card given to the patient makes a $25 cash pay.  Is not any more than that.  If it is in the pharmacy is doing something wrong.  Nevertheless if they want to switch the medication it can be switched to Cheratussin AC 5 mL's 3 times daily as needed cough, dispense 120 mL's.  If it is the inhaler anoro, a free coupon for 1 month was given to the patient and even patient is on Medicare can use the coupon.

## 2019-08-28 NOTE — TELEPHONE ENCOUNTER
Pharmacy called and left a voicemail saying pt's insurance wont cover a medication recently prescribed to patient.  Pharmacist didn't say which one

## 2019-08-29 ENCOUNTER — HOSPITAL ENCOUNTER (OUTPATIENT)
Dept: MAMMOGRAPHY | Facility: HOSPITAL | Age: 69
Discharge: HOME | End: 2019-08-29
Admitting: FAMILY MEDICINE

## 2019-08-29 DIAGNOSIS — Z12.31 VISIT FOR SCREENING MAMMOGRAM: ICD-10-CM

## 2019-08-29 PROCEDURE — 77067 SCR MAMMO BI INCL CAD: CPT

## 2019-08-29 PROCEDURE — 77063 BREAST TOMOSYNTHESIS BI: CPT

## 2019-09-06 VITALS
RESPIRATION RATE: 18 BRPM | DIASTOLIC BLOOD PRESSURE: 54 MMHG | HEART RATE: 71 BPM | RESPIRATION RATE: 20 BRPM | DIASTOLIC BLOOD PRESSURE: 78 MMHG | SYSTOLIC BLOOD PRESSURE: 122 MMHG | HEART RATE: 76 BPM | HEART RATE: 67 BPM | DIASTOLIC BLOOD PRESSURE: 68 MMHG | SYSTOLIC BLOOD PRESSURE: 132 MMHG | HEART RATE: 58 BPM | RESPIRATION RATE: 21 BRPM | RESPIRATION RATE: 10 BRPM | RESPIRATION RATE: 15 BRPM | HEART RATE: 90 BPM | OXYGEN SATURATION: 97 % | DIASTOLIC BLOOD PRESSURE: 62 MMHG | OXYGEN SATURATION: 99 % | SYSTOLIC BLOOD PRESSURE: 137 MMHG | RESPIRATION RATE: 16 BRPM | HEIGHT: 63 IN | DIASTOLIC BLOOD PRESSURE: 63 MMHG | HEART RATE: 65 BPM | OXYGEN SATURATION: 94 % | HEART RATE: 63 BPM | TEMPERATURE: 98.5 F | SYSTOLIC BLOOD PRESSURE: 131 MMHG | HEART RATE: 61 BPM | SYSTOLIC BLOOD PRESSURE: 117 MMHG | RESPIRATION RATE: 13 BRPM | WEIGHT: 126 LBS | SYSTOLIC BLOOD PRESSURE: 102 MMHG | DIASTOLIC BLOOD PRESSURE: 80 MMHG | OXYGEN SATURATION: 95 % | TEMPERATURE: 97.3 F | DIASTOLIC BLOOD PRESSURE: 73 MMHG | SYSTOLIC BLOOD PRESSURE: 99 MMHG | DIASTOLIC BLOOD PRESSURE: 60 MMHG | RESPIRATION RATE: 14 BRPM | OXYGEN SATURATION: 100 %

## 2019-09-06 DIAGNOSIS — E78.5 HYPERLIPIDEMIA, UNSPECIFIED HYPERLIPIDEMIA TYPE: ICD-10-CM

## 2019-09-06 RX ORDER — PRAVASTATIN SODIUM 40 MG
TABLET ORAL
Qty: 90 TABLET | Refills: 1 | Status: SHIPPED | OUTPATIENT
Start: 2019-09-06 | End: 2020-03-23

## 2019-09-09 ENCOUNTER — AMBULATORY SURGICAL CENTER (AMBULATORY)
Dept: URBAN - METROPOLITAN AREA SURGERY 17 | Facility: SURGERY | Age: 69
End: 2019-09-09
Payer: MEDICARE

## 2019-09-09 DIAGNOSIS — Z09 ENCOUNTER FOR FOLLOW-UP EXAMINATION AFTER COMPLETED TREATMEN: ICD-10-CM

## 2019-09-09 DIAGNOSIS — K57.30 DIVERTICULOSIS OF LARGE INTESTINE WITHOUT PERFORATION OR ABS: ICD-10-CM

## 2019-09-09 DIAGNOSIS — K64.1 SECOND DEGREE HEMORRHOIDS: ICD-10-CM

## 2019-09-09 DIAGNOSIS — Z86.010 PERSONAL HISTORY OF COLONIC POLYPS: ICD-10-CM

## 2019-09-09 PROCEDURE — G0105 COLORECTAL SCRN; HI RISK IND: HCPCS | Performed by: INTERNAL MEDICINE

## 2020-01-17 LAB
ALBUMIN SERPL-MCNC: 4.3 G/DL (ref 3.5–5.2)
ALBUMIN/GLOB SERPL: 1.6 G/DL
ALP SERPL-CCNC: 54 U/L (ref 39–117)
ALT SERPL-CCNC: 16 U/L (ref 1–33)
AST SERPL-CCNC: 26 U/L (ref 1–32)
BASOPHILS # BLD AUTO: 0.02 10*3/MM3 (ref 0–0.2)
BASOPHILS NFR BLD AUTO: 0.7 % (ref 0–1.5)
BILIRUB SERPL-MCNC: 1 MG/DL (ref 0.2–1.2)
BUN SERPL-MCNC: 13 MG/DL (ref 8–23)
BUN/CREAT SERPL: 14.3 (ref 7–25)
CALCIUM SERPL-MCNC: 8.9 MG/DL (ref 8.6–10.5)
CHLORIDE SERPL-SCNC: 102 MMOL/L (ref 98–107)
CHOLEST SERPL-MCNC: 217 MG/DL (ref 0–200)
CO2 SERPL-SCNC: 28.3 MMOL/L (ref 22–29)
CREAT SERPL-MCNC: 0.91 MG/DL (ref 0.57–1)
EOSINOPHIL # BLD AUTO: 0.05 10*3/MM3 (ref 0–0.4)
EOSINOPHIL NFR BLD AUTO: 1.6 % (ref 0.3–6.2)
ERYTHROCYTE [DISTWIDTH] IN BLOOD BY AUTOMATED COUNT: 11.9 % (ref 12.3–15.4)
FT4I SERPL CALC-MCNC: 2.2 (ref 1.2–4.9)
GLOBULIN SER CALC-MCNC: 2.7 GM/DL
GLUCOSE SERPL-MCNC: 81 MG/DL (ref 65–99)
HBA1C MFR BLD: 5.8 % (ref 4.8–5.6)
HCT VFR BLD AUTO: 42.3 % (ref 34–46.6)
HDLC SERPL-MCNC: 68 MG/DL (ref 40–60)
HGB BLD-MCNC: 14 G/DL (ref 12–15.9)
IMM GRANULOCYTES # BLD AUTO: 0 10*3/MM3 (ref 0–0.05)
IMM GRANULOCYTES NFR BLD AUTO: 0 % (ref 0–0.5)
LDLC SERPL CALC-MCNC: 132 MG/DL (ref 0–100)
LDLC/HDLC SERPL: 1.94 {RATIO}
LYMPHOCYTES # BLD AUTO: 1.05 10*3/MM3 (ref 0.7–3.1)
LYMPHOCYTES NFR BLD AUTO: 34.3 % (ref 19.6–45.3)
MCH RBC QN AUTO: 30.6 PG (ref 26.6–33)
MCHC RBC AUTO-ENTMCNC: 33.1 G/DL (ref 31.5–35.7)
MCV RBC AUTO: 92.4 FL (ref 79–97)
MONOCYTES # BLD AUTO: 0.37 10*3/MM3 (ref 0.1–0.9)
MONOCYTES NFR BLD AUTO: 12.1 % (ref 5–12)
NEUTROPHILS # BLD AUTO: 1.57 10*3/MM3 (ref 1.7–7)
NEUTROPHILS NFR BLD AUTO: 51.3 % (ref 42.7–76)
NRBC BLD AUTO-RTO: 0 /100 WBC (ref 0–0.2)
PLATELET # BLD AUTO: 203 10*3/MM3 (ref 140–450)
POTASSIUM SERPL-SCNC: 4 MMOL/L (ref 3.5–5.2)
PROT SERPL-MCNC: 7 G/DL (ref 6–8.5)
RBC # BLD AUTO: 4.58 10*6/MM3 (ref 3.77–5.28)
SODIUM SERPL-SCNC: 141 MMOL/L (ref 136–145)
T3RU NFR SERPL: 26 % (ref 24–39)
T4 SERPL-MCNC: 8.5 UG/DL (ref 4.5–12)
TRIGL SERPL-MCNC: 85 MG/DL (ref 0–150)
TSH SERPL DL<=0.005 MIU/L-ACNC: 4.06 UIU/ML (ref 0.45–4.5)
VLDLC SERPL CALC-MCNC: 17 MG/DL
WBC # BLD AUTO: 3.06 10*3/MM3 (ref 3.4–10.8)

## 2020-01-23 ENCOUNTER — OFFICE VISIT (OUTPATIENT)
Dept: INTERNAL MEDICINE | Facility: CLINIC | Age: 70
End: 2020-01-23

## 2020-01-23 VITALS
WEIGHT: 128 LBS | OXYGEN SATURATION: 98 % | HEIGHT: 63 IN | SYSTOLIC BLOOD PRESSURE: 98 MMHG | DIASTOLIC BLOOD PRESSURE: 60 MMHG | HEART RATE: 65 BPM | RESPIRATION RATE: 16 BRPM | BODY MASS INDEX: 22.68 KG/M2

## 2020-01-23 DIAGNOSIS — E03.9 HYPOTHYROIDISM, UNSPECIFIED TYPE: ICD-10-CM

## 2020-01-23 DIAGNOSIS — R73.03 PREDIABETES: ICD-10-CM

## 2020-01-23 DIAGNOSIS — E78.5 HYPERLIPIDEMIA, UNSPECIFIED HYPERLIPIDEMIA TYPE: Primary | ICD-10-CM

## 2020-01-23 PROCEDURE — 99214 OFFICE O/P EST MOD 30 MIN: CPT | Performed by: FAMILY MEDICINE

## 2020-01-23 NOTE — PROGRESS NOTES
Subjective   Penny Brown is a 69 y.o. female.     Chief Complaint   Patient presents with   • Hyperlipidemia         History of Present Illness     Patient past medical history for hyperlipidemia.  Patient's current taking pravastatin 40 mg daily.  Patient denies any side effects of the medication.  Patient notes that she is doing well with the medicine.  Her most recent lipid panel has shown to be good, and stable, even though her LDL is slightly elevated.    Patient has hypothyroidism.  She is currently levothyroxine 25 mcg daily.  She denies any side effects of the medication.  Patient states that she is doing well with the medicine.    Patient also have prediabetes.  She is currently being monitored with diet and exercise.  Her hemoglobin A1c came back at eight 5.8.    The following portions of the patient's history were reviewed and updated as appropriate: allergies, current medications, past family history, past medical history, past social history, past surgical history and problem list.    Review of Systems   Constitutional: Negative for chills and fever.   HENT: Negative for congestion, rhinorrhea, sinus pain and sore throat.    Eyes: Negative for photophobia and visual disturbance.   Respiratory: Negative for cough, chest tightness and shortness of breath.    Cardiovascular: Negative for chest pain and palpitations.   Gastrointestinal: Negative for diarrhea, nausea and vomiting.   Genitourinary: Negative for dysuria, frequency and urgency.   Skin: Negative for rash and wound.   Neurological: Negative for dizziness and syncope.   Psychiatric/Behavioral: Negative for behavioral problems and confusion.       Objective   Physical Exam   Constitutional: She is oriented to person, place, and time. She appears well-developed and well-nourished.   HENT:   Head: Normocephalic and atraumatic.   Right Ear: External ear normal.   Left Ear: External ear normal.   Eyes: EOM are normal.   Neck: Normal range of  motion. Neck supple.   Cardiovascular: Normal rate, regular rhythm and normal heart sounds.   Pulmonary/Chest: Effort normal and breath sounds normal. No respiratory distress.   Musculoskeletal: Normal range of motion.   Lymphadenopathy:     She has no cervical adenopathy.   Neurological: She is alert and oriented to person, place, and time.   Skin: Skin is warm.   Psychiatric: She has a normal mood and affect. Her behavior is normal.   Nursing note and vitals reviewed.      Vitals:    01/23/20 1058   BP: 98/60   Pulse: 65   Resp: 16   SpO2: 98%     Body mass index is 22.68 kg/m².      Assessment/Plan   Penny was seen today for hyperlipidemia.    Diagnoses and all orders for this visit:    Hyperlipidemia, unspecified hyperlipidemia type  -     Comprehensive Metabolic Panel; Future  -     Lipid Panel With LDL / HDL Ratio; Future  -     We will continue pravastatin 40 mg daily.  Continue to monitor with diet and exercise.    Hypothyroidism, unspecified type  -     Thyroid Panel With TSH; Future  -     Continue levothyroxine 25 mcg daily.    Prediabetes  -     Hemoglobin A1c; Future  -     Continue diet and exercise.          No follow-ups on file.    Dictated utilizing Dragon Voice Recognition Software

## 2020-01-24 ENCOUNTER — RESULTS ENCOUNTER (OUTPATIENT)
Dept: INTERNAL MEDICINE | Facility: CLINIC | Age: 70
End: 2020-01-24

## 2020-01-24 DIAGNOSIS — E78.5 HYPERLIPIDEMIA, UNSPECIFIED HYPERLIPIDEMIA TYPE: ICD-10-CM

## 2020-01-24 DIAGNOSIS — R73.03 PREDIABETES: ICD-10-CM

## 2020-01-24 DIAGNOSIS — E03.9 HYPOTHYROIDISM, UNSPECIFIED TYPE: ICD-10-CM

## 2020-01-30 DIAGNOSIS — E07.9 THYROID DISEASE: ICD-10-CM

## 2020-01-30 RX ORDER — LEVOTHYROXINE SODIUM 0.03 MG/1
TABLET ORAL
Qty: 90 TABLET | Refills: 1 | Status: SHIPPED | OUTPATIENT
Start: 2020-01-30 | End: 2020-06-18

## 2020-01-31 ENCOUNTER — TELEPHONE (OUTPATIENT)
Dept: INTERNAL MEDICINE | Facility: CLINIC | Age: 70
End: 2020-01-31

## 2020-02-03 DIAGNOSIS — H70.90 MASTOIDITIS, UNSPECIFIED LATERALITY: Primary | ICD-10-CM

## 2020-03-23 DIAGNOSIS — E78.5 HYPERLIPIDEMIA, UNSPECIFIED HYPERLIPIDEMIA TYPE: ICD-10-CM

## 2020-03-23 RX ORDER — PRAVASTATIN SODIUM 40 MG
TABLET ORAL
Qty: 90 TABLET | Refills: 1 | Status: SHIPPED | OUTPATIENT
Start: 2020-03-23 | End: 2020-08-10

## 2020-06-18 DIAGNOSIS — E07.9 THYROID DISEASE: ICD-10-CM

## 2020-06-18 RX ORDER — LEVOTHYROXINE SODIUM 0.03 MG/1
TABLET ORAL
Qty: 90 TABLET | Refills: 0 | Status: SHIPPED | OUTPATIENT
Start: 2020-06-18 | End: 2020-08-14

## 2020-06-30 ENCOUNTER — TELEPHONE (OUTPATIENT)
Dept: INTERNAL MEDICINE | Facility: CLINIC | Age: 70
End: 2020-06-30

## 2020-07-08 DIAGNOSIS — Z12.39 SCREENING FOR BREAST CANCER: Primary | ICD-10-CM

## 2020-07-08 DIAGNOSIS — Z12.31 ENCOUNTER FOR SCREENING MAMMOGRAM FOR MALIGNANT NEOPLASM OF BREAST: ICD-10-CM

## 2020-07-08 NOTE — TELEPHONE ENCOUNTER
Order has been placed.  When they call her to set it up, she can request to have it done on September 23.

## 2020-07-13 ENCOUNTER — TRANSCRIBE ORDERS (OUTPATIENT)
Dept: ADMINISTRATIVE | Facility: HOSPITAL | Age: 70
End: 2020-07-13

## 2020-07-13 DIAGNOSIS — Z12.31 VISIT FOR SCREENING MAMMOGRAM: Primary | ICD-10-CM

## 2020-08-08 DIAGNOSIS — E78.5 HYPERLIPIDEMIA, UNSPECIFIED HYPERLIPIDEMIA TYPE: ICD-10-CM

## 2020-08-10 RX ORDER — PRAVASTATIN SODIUM 40 MG
TABLET ORAL
Qty: 90 TABLET | Refills: 1 | Status: SHIPPED | OUTPATIENT
Start: 2020-08-10 | End: 2020-12-31

## 2020-08-13 DIAGNOSIS — E07.9 THYROID DISEASE: ICD-10-CM

## 2020-08-14 RX ORDER — LEVOTHYROXINE SODIUM 0.03 MG/1
TABLET ORAL
Qty: 90 TABLET | Refills: 0 | Status: SHIPPED | OUTPATIENT
Start: 2020-08-14 | End: 2020-11-30

## 2020-08-31 ENCOUNTER — HOSPITAL ENCOUNTER (OUTPATIENT)
Dept: MAMMOGRAPHY | Facility: HOSPITAL | Age: 70
End: 2020-08-31

## 2020-09-23 ENCOUNTER — TRANSCRIBE ORDERS (OUTPATIENT)
Dept: ADMINISTRATIVE | Facility: HOSPITAL | Age: 70
End: 2020-09-23

## 2020-09-23 ENCOUNTER — LAB (OUTPATIENT)
Dept: LAB | Facility: HOSPITAL | Age: 70
End: 2020-09-23

## 2020-09-23 DIAGNOSIS — R73.03 DIABETES MELLITUS, LATENT: ICD-10-CM

## 2020-09-23 DIAGNOSIS — I51.9 MYXEDEMA HEART DISEASE: ICD-10-CM

## 2020-09-23 DIAGNOSIS — E78.5 HYPERLIPIDEMIA, UNSPECIFIED HYPERLIPIDEMIA TYPE: Primary | ICD-10-CM

## 2020-09-23 DIAGNOSIS — E03.9 MYXEDEMA HEART DISEASE: ICD-10-CM

## 2020-09-23 DIAGNOSIS — E78.5 HYPERLIPIDEMIA, UNSPECIFIED HYPERLIPIDEMIA TYPE: ICD-10-CM

## 2020-09-23 LAB
ALBUMIN SERPL-MCNC: 4.1 G/DL (ref 3.5–5.2)
ALBUMIN/GLOB SERPL: 1.8 G/DL
ALP SERPL-CCNC: 58 U/L (ref 39–117)
ALT SERPL W P-5'-P-CCNC: 17 U/L (ref 1–33)
ANION GAP SERPL CALCULATED.3IONS-SCNC: 9.6 MMOL/L (ref 5–15)
AST SERPL-CCNC: 26 U/L (ref 1–32)
BILIRUB SERPL-MCNC: 0.6 MG/DL (ref 0–1.2)
BUN SERPL-MCNC: 14 MG/DL (ref 8–23)
BUN/CREAT SERPL: 15.2 (ref 7–25)
CALCIUM SPEC-SCNC: 9 MG/DL (ref 8.6–10.5)
CHLORIDE SERPL-SCNC: 104 MMOL/L (ref 98–107)
CHOLEST SERPL-MCNC: 205 MG/DL (ref 0–200)
CHOLEST SERPL-MCNC: 212 MG/DL (ref 0–200)
CO2 SERPL-SCNC: 27.4 MMOL/L (ref 22–29)
CREAT SERPL-MCNC: 0.92 MG/DL (ref 0.57–1)
GFR SERPL CREATININE-BSD FRML MDRD: 60 ML/MIN/1.73
GLOBULIN UR ELPH-MCNC: 2.3 GM/DL
GLUCOSE SERPL-MCNC: 86 MG/DL (ref 65–99)
HBA1C MFR BLD: 5.3 % (ref 4.8–5.6)
HDLC SERPL-MCNC: 61 MG/DL (ref 40–60)
HDLC SERPL-MCNC: 64 MG/DL (ref 40–60)
LDLC SERPL CALC-MCNC: 130 MG/DL (ref 0–100)
LDLC SERPL CALC-MCNC: 134 MG/DL (ref 0–100)
LDLC/HDLC SERPL: 2.09 {RATIO}
LDLC/HDLC SERPL: 2.13 {RATIO}
POTASSIUM SERPL-SCNC: 4.1 MMOL/L (ref 3.5–5.2)
PROT SERPL-MCNC: 6.4 G/DL (ref 6–8.5)
SODIUM SERPL-SCNC: 141 MMOL/L (ref 136–145)
T-UPTAKE NFR SERPL: 1.13 TBI (ref 0.8–1.3)
T4 SERPL-MCNC: 7.99 MCG/DL (ref 4.5–11.7)
TRIGL SERPL-MCNC: 69 MG/DL (ref 0–150)
TRIGL SERPL-MCNC: 70 MG/DL (ref 0–150)
TSH SERPL DL<=0.05 MIU/L-ACNC: 2.78 UIU/ML (ref 0.27–4.2)
VLDLC SERPL-MCNC: 13.8 MG/DL
VLDLC SERPL-MCNC: 14 MG/DL

## 2020-09-23 PROCEDURE — 84436 ASSAY OF TOTAL THYROXINE: CPT | Performed by: FAMILY MEDICINE

## 2020-09-23 PROCEDURE — 84443 ASSAY THYROID STIM HORMONE: CPT | Performed by: FAMILY MEDICINE

## 2020-09-23 PROCEDURE — 80053 COMPREHEN METABOLIC PANEL: CPT | Performed by: FAMILY MEDICINE

## 2020-09-23 PROCEDURE — 80061 LIPID PANEL: CPT | Performed by: FAMILY MEDICINE

## 2020-09-23 PROCEDURE — 36415 COLL VENOUS BLD VENIPUNCTURE: CPT | Performed by: FAMILY MEDICINE

## 2020-09-23 PROCEDURE — 84479 ASSAY OF THYROID (T3 OR T4): CPT | Performed by: FAMILY MEDICINE

## 2020-09-23 PROCEDURE — 83036 HEMOGLOBIN GLYCOSYLATED A1C: CPT | Performed by: FAMILY MEDICINE

## 2020-10-13 ENCOUNTER — HOSPITAL ENCOUNTER (OUTPATIENT)
Dept: MAMMOGRAPHY | Facility: HOSPITAL | Age: 70
End: 2020-10-13

## 2020-11-16 ENCOUNTER — OFFICE VISIT (OUTPATIENT)
Dept: INTERNAL MEDICINE | Facility: CLINIC | Age: 70
End: 2020-11-16

## 2020-11-16 ENCOUNTER — LAB (OUTPATIENT)
Dept: LAB | Facility: HOSPITAL | Age: 70
End: 2020-11-16

## 2020-11-16 VITALS
DIASTOLIC BLOOD PRESSURE: 62 MMHG | BODY MASS INDEX: 23.04 KG/M2 | OXYGEN SATURATION: 98 % | SYSTOLIC BLOOD PRESSURE: 88 MMHG | WEIGHT: 130 LBS | HEIGHT: 63 IN | TEMPERATURE: 98.5 F | RESPIRATION RATE: 16 BRPM | HEART RATE: 61 BPM

## 2020-11-16 DIAGNOSIS — I95.89 HYPOTENSION DUE TO HYPOVOLEMIA: ICD-10-CM

## 2020-11-16 DIAGNOSIS — E86.1 HYPOTENSION DUE TO HYPOVOLEMIA: ICD-10-CM

## 2020-11-16 DIAGNOSIS — R42 DIZZINESS: Primary | ICD-10-CM

## 2020-11-16 LAB
ALBUMIN SERPL-MCNC: 4.3 G/DL (ref 3.5–5.2)
ALBUMIN/GLOB SERPL: 1.5 G/DL
ALP SERPL-CCNC: 59 U/L (ref 39–117)
ALT SERPL W P-5'-P-CCNC: 17 U/L (ref 1–33)
ANION GAP SERPL CALCULATED.3IONS-SCNC: 7.3 MMOL/L (ref 5–15)
AST SERPL-CCNC: 27 U/L (ref 1–32)
BACTERIA UR QL AUTO: NORMAL /HPF
BASOPHILS # BLD AUTO: 0.02 10*3/MM3 (ref 0–0.2)
BASOPHILS NFR BLD AUTO: 0.4 % (ref 0–1.5)
BILIRUB SERPL-MCNC: 0.5 MG/DL (ref 0–1.2)
BILIRUB UR QL STRIP: NEGATIVE
BUN SERPL-MCNC: 12 MG/DL (ref 8–23)
BUN/CREAT SERPL: 15.6 (ref 7–25)
CALCIUM SPEC-SCNC: 10.1 MG/DL (ref 8.6–10.5)
CHLORIDE SERPL-SCNC: 104 MMOL/L (ref 98–107)
CLARITY UR: CLEAR
CO2 SERPL-SCNC: 27.7 MMOL/L (ref 22–29)
COLOR UR: YELLOW
CREAT SERPL-MCNC: 0.77 MG/DL (ref 0.57–1)
DEPRECATED RDW RBC AUTO: 40.2 FL (ref 37–54)
EOSINOPHIL # BLD AUTO: 0.05 10*3/MM3 (ref 0–0.4)
EOSINOPHIL NFR BLD AUTO: 1.1 % (ref 0.3–6.2)
ERYTHROCYTE [DISTWIDTH] IN BLOOD BY AUTOMATED COUNT: 12 % (ref 12.3–15.4)
GFR SERPL CREATININE-BSD FRML MDRD: 74 ML/MIN/1.73
GLOBULIN UR ELPH-MCNC: 2.9 GM/DL
GLUCOSE SERPL-MCNC: 87 MG/DL (ref 65–99)
GLUCOSE UR STRIP-MCNC: NEGATIVE MG/DL
HCT VFR BLD AUTO: 39.4 % (ref 34–46.6)
HGB BLD-MCNC: 13.4 G/DL (ref 12–15.9)
HGB UR QL STRIP.AUTO: ABNORMAL
HYALINE CASTS UR QL AUTO: NORMAL /LPF
IMM GRANULOCYTES # BLD AUTO: 0.01 10*3/MM3 (ref 0–0.05)
IMM GRANULOCYTES NFR BLD AUTO: 0.2 % (ref 0–0.5)
KETONES UR QL STRIP: NEGATIVE
LEUKOCYTE ESTERASE UR QL STRIP.AUTO: NEGATIVE
LYMPHOCYTES # BLD AUTO: 1.43 10*3/MM3 (ref 0.7–3.1)
LYMPHOCYTES NFR BLD AUTO: 32.1 % (ref 19.6–45.3)
MCH RBC QN AUTO: 31.6 PG (ref 26.6–33)
MCHC RBC AUTO-ENTMCNC: 34 G/DL (ref 31.5–35.7)
MCV RBC AUTO: 92.9 FL (ref 79–97)
MONOCYTES # BLD AUTO: 0.43 10*3/MM3 (ref 0.1–0.9)
MONOCYTES NFR BLD AUTO: 9.6 % (ref 5–12)
NEUTROPHILS NFR BLD AUTO: 2.52 10*3/MM3 (ref 1.7–7)
NEUTROPHILS NFR BLD AUTO: 56.6 % (ref 42.7–76)
NITRITE UR QL STRIP: NEGATIVE
NRBC BLD AUTO-RTO: 0 /100 WBC (ref 0–0.2)
PH UR STRIP.AUTO: 5.5 [PH] (ref 5–8)
PLATELET # BLD AUTO: 222 10*3/MM3 (ref 140–450)
PMV BLD AUTO: 10.3 FL (ref 6–12)
POTASSIUM SERPL-SCNC: 4.4 MMOL/L (ref 3.5–5.2)
PROT SERPL-MCNC: 7.2 G/DL (ref 6–8.5)
PROT UR QL STRIP: NEGATIVE
RBC # BLD AUTO: 4.24 10*6/MM3 (ref 3.77–5.28)
RBC # UR: NORMAL /HPF
REF LAB TEST METHOD: NORMAL
SODIUM SERPL-SCNC: 139 MMOL/L (ref 136–145)
SP GR UR STRIP: 1.01 (ref 1–1.03)
SQUAMOUS #/AREA URNS HPF: NORMAL /HPF
UROBILINOGEN UR QL STRIP: ABNORMAL
WBC # BLD AUTO: 4.46 10*3/MM3 (ref 3.4–10.8)
WBC UR QL AUTO: NORMAL /HPF

## 2020-11-16 PROCEDURE — 80053 COMPREHEN METABOLIC PANEL: CPT | Performed by: FAMILY MEDICINE

## 2020-11-16 PROCEDURE — 85025 COMPLETE CBC W/AUTO DIFF WBC: CPT | Performed by: FAMILY MEDICINE

## 2020-11-16 PROCEDURE — 81001 URINALYSIS AUTO W/SCOPE: CPT | Performed by: FAMILY MEDICINE

## 2020-11-16 PROCEDURE — 36415 COLL VENOUS BLD VENIPUNCTURE: CPT | Performed by: FAMILY MEDICINE

## 2020-11-16 PROCEDURE — 99213 OFFICE O/P EST LOW 20 MIN: CPT | Performed by: FAMILY MEDICINE

## 2020-11-16 NOTE — PROGRESS NOTES
Subjective   Penny Brown is a 70 y.o. female.     Chief Complaint   Patient presents with   • Dizziness         History of Present Illness     Patient presents at today's office visit for concern for dizziness.  Patient initially believed it was vertigo.  Patient states that she was doing some [place] maneuvers, and she thought that that was helping her.  Patient does have a history of having vertigo.  Patient states that she does notice that her blood pressure has been low.  She does not take any blood pressure medication.  Today's office her blood pressure 88/62.  At times she can feel dizzy come from a seated to standing position.  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  Upon further questioning, it appears that she has not taken plenty of fluids.    The following portions of the patient's history were reviewed and updated as appropriate: allergies, current medications, past family history, past medical history, past social history, past surgical history and problem list.    Review of Systems   Constitutional: Negative for chills and fever.   HENT: Negative for congestion, rhinorrhea, sinus pain and sore throat.    Eyes: Negative for photophobia and visual disturbance.   Respiratory: Negative for cough, chest tightness and shortness of breath.    Cardiovascular: Negative for chest pain and palpitations.   Gastrointestinal: Negative for diarrhea, nausea and vomiting.   Genitourinary: Negative for dysuria, frequency and urgency.   Skin: Negative for rash and wound.   Neurological: Negative for dizziness and syncope.   Psychiatric/Behavioral: Negative for behavioral problems and confusion.       Objective   Physical Exam  Vitals signs and nursing note reviewed.   Constitutional:       Appearance: She is well-developed.   HENT:      Head: Normocephalic and atraumatic.      Right Ear: External ear normal.      Left Ear: External ear normal.   Neck:      Musculoskeletal:  Normal range of motion and neck supple.   Cardiovascular:      Rate and Rhythm: Normal rate and regular rhythm.      Heart sounds: Normal heart sounds.   Pulmonary:      Effort: Pulmonary effort is normal. No respiratory distress.      Breath sounds: Normal breath sounds.   Musculoskeletal: Normal range of motion.   Lymphadenopathy:      Cervical: No cervical adenopathy.   Skin:     General: Skin is warm.   Neurological:      Mental Status: She is alert and oriented to person, place, and time.   Psychiatric:         Behavior: Behavior normal.         Vitals:    11/16/20 1516   BP: (!) 88/62   Pulse: 61   Resp: 16   Temp: 98.5 °F (36.9 °C)   SpO2: 98%     Body mass index is 23.03 kg/m².      Assessment/Plan   Diagnoses and all orders for this visit:    1. Dizziness (Primary)    2. Hypotension due to hypovolemia  -     Comprehensive Metabolic Panel  -     CBC & Differential  -     Urinalysis With Microscopic - Urine, Clean Catch  -     CBC Auto Differential  -     Urinalysis without microscopic (no culture) - Urine, Clean Catch  -     Urinalysis, Microscopic Only - Urine, Clean Catch      I discussed with patient at today's office visit that I would like her to drink plenty of fluids, as she may have some hypotension due to hypovolemia.  Her dizziness is most likely caused by the hypotension.  I would like to reevaluate her in 1 week.  I have addressed with patient that if any signs or symptoms worsen in any way, she is to go to the emergency room.  Patient did jokingly say she is not going to the emergency room right now, but did agree that if there was any worsening of her symptoms she would go to the emergency room.  She is currently not in any active distress.    No follow-ups on file.    Dictated utilizing Dragon Voice Recognition Software

## 2020-11-24 ENCOUNTER — OFFICE VISIT (OUTPATIENT)
Dept: INTERNAL MEDICINE | Facility: CLINIC | Age: 70
End: 2020-11-24

## 2020-11-24 VITALS
BODY MASS INDEX: 23.04 KG/M2 | RESPIRATION RATE: 16 BRPM | TEMPERATURE: 98.7 F | SYSTOLIC BLOOD PRESSURE: 108 MMHG | HEIGHT: 63 IN | WEIGHT: 130 LBS | HEART RATE: 68 BPM | OXYGEN SATURATION: 98 % | DIASTOLIC BLOOD PRESSURE: 70 MMHG

## 2020-11-24 DIAGNOSIS — I95.89 HYPOTENSION DUE TO HYPOVOLEMIA: ICD-10-CM

## 2020-11-24 DIAGNOSIS — E86.1 HYPOTENSION DUE TO HYPOVOLEMIA: ICD-10-CM

## 2020-11-24 DIAGNOSIS — Z23 NEEDS FLU SHOT: ICD-10-CM

## 2020-11-24 DIAGNOSIS — R42 DIZZINESS: Primary | ICD-10-CM

## 2020-11-24 PROCEDURE — 99213 OFFICE O/P EST LOW 20 MIN: CPT | Performed by: FAMILY MEDICINE

## 2020-11-24 PROCEDURE — G0008 ADMIN INFLUENZA VIRUS VAC: HCPCS | Performed by: FAMILY MEDICINE

## 2020-11-24 PROCEDURE — 90694 VACC AIIV4 NO PRSRV 0.5ML IM: CPT | Performed by: FAMILY MEDICINE

## 2020-11-25 ENCOUNTER — TELEPHONE (OUTPATIENT)
Dept: INTERNAL MEDICINE | Facility: CLINIC | Age: 70
End: 2020-11-25

## 2020-11-25 NOTE — TELEPHONE ENCOUNTER
Let patient know that this is most likely from the flu shot that she had.  This should resolve in a couple days.  However if it continues, she should get checked out for Covid.

## 2020-11-25 NOTE — TELEPHONE ENCOUNTER
Patient calling concerning some symptoms she is having. Said concerned it may be from flu shot yesterday and would like callback to discuss. She is having headache and chills(no fever).    Callback# [phone]

## 2020-11-27 DIAGNOSIS — E07.9 THYROID DISEASE: ICD-10-CM

## 2020-11-30 RX ORDER — LEVOTHYROXINE SODIUM 0.03 MG/1
TABLET ORAL
Qty: 90 TABLET | Refills: 0 | Status: SHIPPED | OUTPATIENT
Start: 2020-11-30 | End: 2021-05-14 | Stop reason: SDUPTHER

## 2020-12-07 ENCOUNTER — HOSPITAL ENCOUNTER (OUTPATIENT)
Dept: MAMMOGRAPHY | Facility: HOSPITAL | Age: 70
Discharge: HOME | End: 2020-12-07
Admitting: FAMILY MEDICINE

## 2020-12-07 DIAGNOSIS — Z12.31 VISIT FOR SCREENING MAMMOGRAM: ICD-10-CM

## 2020-12-07 PROCEDURE — 77063 BREAST TOMOSYNTHESIS BI: CPT

## 2020-12-07 PROCEDURE — 77067 SCR MAMMO BI INCL CAD: CPT

## 2020-12-12 NOTE — PROGRESS NOTES
Negative mammogram.  No mammographic evidence of malignancy. Recommend annual screening  mammogram in one year.

## 2020-12-28 ENCOUNTER — LAB (OUTPATIENT)
Dept: LAB | Facility: HOSPITAL | Age: 70
End: 2020-12-28

## 2020-12-28 ENCOUNTER — OFFICE VISIT (OUTPATIENT)
Dept: INTERNAL MEDICINE | Facility: CLINIC | Age: 70
End: 2020-12-28

## 2020-12-28 VITALS
OXYGEN SATURATION: 99 % | DIASTOLIC BLOOD PRESSURE: 64 MMHG | WEIGHT: 136 LBS | SYSTOLIC BLOOD PRESSURE: 98 MMHG | TEMPERATURE: 98.3 F | RESPIRATION RATE: 18 BRPM | HEART RATE: 72 BPM | HEIGHT: 63 IN | BODY MASS INDEX: 24.1 KG/M2

## 2020-12-28 DIAGNOSIS — Z00.00 HEALTHCARE MAINTENANCE: ICD-10-CM

## 2020-12-28 DIAGNOSIS — Z23 NEED FOR PNEUMOCOCCAL VACCINATION: ICD-10-CM

## 2020-12-28 DIAGNOSIS — Z00.00 MEDICARE ANNUAL WELLNESS VISIT, SUBSEQUENT: Primary | ICD-10-CM

## 2020-12-28 LAB
ALBUMIN SERPL-MCNC: 4.2 G/DL (ref 3.5–5.2)
ALBUMIN/GLOB SERPL: 1.4 G/DL
ALP SERPL-CCNC: 63 U/L (ref 39–117)
ALT SERPL W P-5'-P-CCNC: 21 U/L (ref 1–33)
ANION GAP SERPL CALCULATED.3IONS-SCNC: 8.9 MMOL/L (ref 5–15)
AST SERPL-CCNC: 32 U/L (ref 1–32)
BASOPHILS # BLD AUTO: 0.02 10*3/MM3 (ref 0–0.2)
BASOPHILS NFR BLD AUTO: 0.5 % (ref 0–1.5)
BILIRUB SERPL-MCNC: 0.6 MG/DL (ref 0–1.2)
BUN SERPL-MCNC: 10 MG/DL (ref 8–23)
BUN/CREAT SERPL: 11.6 (ref 7–25)
CALCIUM SPEC-SCNC: 9.7 MG/DL (ref 8.6–10.5)
CHLORIDE SERPL-SCNC: 104 MMOL/L (ref 98–107)
CHOLEST SERPL-MCNC: 236 MG/DL (ref 0–200)
CO2 SERPL-SCNC: 29.1 MMOL/L (ref 22–29)
CREAT SERPL-MCNC: 0.86 MG/DL (ref 0.57–1)
DEPRECATED RDW RBC AUTO: 40.9 FL (ref 37–54)
EOSINOPHIL # BLD AUTO: 0.04 10*3/MM3 (ref 0–0.4)
EOSINOPHIL NFR BLD AUTO: 1 % (ref 0.3–6.2)
ERYTHROCYTE [DISTWIDTH] IN BLOOD BY AUTOMATED COUNT: 11.9 % (ref 12.3–15.4)
GFR SERPL CREATININE-BSD FRML MDRD: 65 ML/MIN/1.73
GLOBULIN UR ELPH-MCNC: 3.1 GM/DL
GLUCOSE SERPL-MCNC: 90 MG/DL (ref 65–99)
HBA1C MFR BLD: 5.92 % (ref 4.8–5.6)
HCT VFR BLD AUTO: 44.2 % (ref 34–46.6)
HDLC SERPL-MCNC: 67 MG/DL (ref 40–60)
HGB BLD-MCNC: 14.6 G/DL (ref 12–15.9)
IMM GRANULOCYTES # BLD AUTO: 0.02 10*3/MM3 (ref 0–0.05)
IMM GRANULOCYTES NFR BLD AUTO: 0.5 % (ref 0–0.5)
LDLC SERPL CALC-MCNC: 153 MG/DL (ref 0–100)
LDLC/HDLC SERPL: 2.25 {RATIO}
LYMPHOCYTES # BLD AUTO: 1.21 10*3/MM3 (ref 0.7–3.1)
LYMPHOCYTES NFR BLD AUTO: 31.8 % (ref 19.6–45.3)
MCH RBC QN AUTO: 30.8 PG (ref 26.6–33)
MCHC RBC AUTO-ENTMCNC: 33 G/DL (ref 31.5–35.7)
MCV RBC AUTO: 93.2 FL (ref 79–97)
MONOCYTES # BLD AUTO: 0.41 10*3/MM3 (ref 0.1–0.9)
MONOCYTES NFR BLD AUTO: 10.8 % (ref 5–12)
NEUTROPHILS NFR BLD AUTO: 2.11 10*3/MM3 (ref 1.7–7)
NEUTROPHILS NFR BLD AUTO: 55.4 % (ref 42.7–76)
NRBC BLD AUTO-RTO: 0 /100 WBC (ref 0–0.2)
PLATELET # BLD AUTO: 211 10*3/MM3 (ref 140–450)
PMV BLD AUTO: 10.2 FL (ref 6–12)
POTASSIUM SERPL-SCNC: 4.5 MMOL/L (ref 3.5–5.2)
PROT SERPL-MCNC: 7.3 G/DL (ref 6–8.5)
RBC # BLD AUTO: 4.74 10*6/MM3 (ref 3.77–5.28)
SODIUM SERPL-SCNC: 142 MMOL/L (ref 136–145)
T-UPTAKE NFR SERPL: 1.08 TBI (ref 0.8–1.3)
T4 SERPL-MCNC: 8.77 MCG/DL (ref 4.5–11.7)
TRIGL SERPL-MCNC: 90 MG/DL (ref 0–150)
TSH SERPL DL<=0.05 MIU/L-ACNC: 2.81 UIU/ML (ref 0.27–4.2)
VLDLC SERPL-MCNC: 16 MG/DL (ref 5–40)
WBC # BLD AUTO: 3.81 10*3/MM3 (ref 3.4–10.8)

## 2020-12-28 PROCEDURE — 36415 COLL VENOUS BLD VENIPUNCTURE: CPT | Performed by: FAMILY MEDICINE

## 2020-12-28 PROCEDURE — 90732 PPSV23 VACC 2 YRS+ SUBQ/IM: CPT | Performed by: FAMILY MEDICINE

## 2020-12-28 PROCEDURE — 84443 ASSAY THYROID STIM HORMONE: CPT | Performed by: FAMILY MEDICINE

## 2020-12-28 PROCEDURE — 80061 LIPID PANEL: CPT | Performed by: FAMILY MEDICINE

## 2020-12-28 PROCEDURE — 83036 HEMOGLOBIN GLYCOSYLATED A1C: CPT | Performed by: FAMILY MEDICINE

## 2020-12-28 PROCEDURE — 80053 COMPREHEN METABOLIC PANEL: CPT | Performed by: FAMILY MEDICINE

## 2020-12-28 PROCEDURE — G0439 PPPS, SUBSEQ VISIT: HCPCS | Performed by: FAMILY MEDICINE

## 2020-12-28 PROCEDURE — 84479 ASSAY OF THYROID (T3 OR T4): CPT | Performed by: FAMILY MEDICINE

## 2020-12-28 PROCEDURE — G0009 ADMIN PNEUMOCOCCAL VACCINE: HCPCS | Performed by: FAMILY MEDICINE

## 2020-12-28 PROCEDURE — 85025 COMPLETE CBC W/AUTO DIFF WBC: CPT | Performed by: FAMILY MEDICINE

## 2020-12-28 PROCEDURE — 84436 ASSAY OF TOTAL THYROXINE: CPT | Performed by: FAMILY MEDICINE

## 2020-12-28 NOTE — PROGRESS NOTES
The ABCs of the Annual Wellness Visit  Subsequent Medicare Wellness Visit    Chief Complaint   Patient presents with   • Medicare Wellness-subsequent       Subjective   History of Present Illness:  Penny Brown is a 70 y.o. female who presents for a Subsequent Medicare Wellness Visit.    HEALTH RISK ASSESSMENT    Recent Hospitalizations:  No hospitalization(s) within the last year.    Current Medical Providers:  Patient Care Team:  Julian Garg MD as PCP - General (Family Medicine)    Smoking Status:  Social History     Tobacco Use   Smoking Status Never Smoker   Smokeless Tobacco Never Used       Alcohol Consumption:  Social History     Substance and Sexual Activity   Alcohol Use Yes   • Frequency: Monthly or less   • Drinks per session: 1 or 2    Comment: 2-4/MONTH       Depression Screen:   PHQ-2/PHQ-9 Depression Screening 12/28/2020   Little interest or pleasure in doing things 0   Feeling down, depressed, or hopeless 0   Trouble falling or staying asleep, or sleeping too much -   Feeling tired or having little energy -   Poor appetite or overeating -   Feeling bad about yourself - or that you are a failure or have let yourself or your family down -   Trouble concentrating on things, such as reading the newspaper or watching television -   Moving or speaking so slowly that other people could have noticed. Or the opposite - being so fidgety or restless that you have been moving around a lot more than usual -   Thoughts that you would be better off dead, or of hurting yourself in some way -   Total Score 0   If you checked off any problems, how difficult have these problems made it for you to do your work, take care of things at home, or get along with other people? -       Fall Risk Screen:  STEADI Fall Risk Assessment has not been completed.    Health Habits and Functional and Cognitive Screening:  Functional & Cognitive Status 12/28/2020   Do you have difficulty preparing food and eating? No   Do you  have difficulty bathing yourself, getting dressed or grooming yourself? No   Do you have difficulty using the toilet? No   Do you have difficulty moving around from place to place? No   Do you have trouble with steps or getting out of a bed or a chair? No   Current Diet Well Balanced Diet   Dental Exam Up to date   Eye Exam Up to date   Exercise (times per week) 2 times per week   Current Exercises Include Walking   Current Exercise Activities Include -   Do you need help using the phone?  No   Are you deaf or do you have serious difficulty hearing?  No   Do you need help with transportation? No   Do you need help shopping? No   Do you need help preparing meals?  No   Do you need help with housework?  No   Do you need help with laundry? No   Do you need help taking your medications? No   Do you need help managing money? No   Do you ever drive or ride in a car without wearing a seat belt? No   Have you felt unusual stress, anger or loneliness in the last month? No   Who do you live with? Other   If you need help, do you have trouble finding someone available to you? No   Have you been bothered in the last four weeks by sexual problems? No   Do you have difficulty concentrating, remembering or making decisions? No         Does the patient have evidence of cognitive impairment? No    Asprin use counseling:Does not need ASA (and currently is not on it)    Age-appropriate Screening Schedule:  Refer to the list below for future screening recommendations based on patient's age, sex and/or medical conditions. Orders for these recommended tests are listed in the plan section. The patient has been provided with a written plan.    Health Maintenance   Topic Date Due   • ZOSTER VACCINE (2 of 3) 02/03/2015   • PAP SMEAR  01/18/2019   • LIPID PANEL  09/23/2021   • MAMMOGRAM  12/07/2021   • TDAP/TD VACCINES (2 - Td) 08/27/2029   • COLONOSCOPY  09/09/2029   • INFLUENZA VACCINE  Discontinued          The following portions of the  patient's history were reviewed and updated as appropriate: allergies, current medications, past family history, past medical history, past social history, past surgical history and problem list.    Outpatient Medications Prior to Visit   Medication Sig Dispense Refill   • Biotin 2500 MCG capsule Take 2 tablets by mouth Daily.     • calcium carbonate (OS-SHONNA) 600 MG tablet Take 600 mg by mouth Daily.     • Cyanocobalamin (VITAMIN B-12) 5000 MCG tablet dispersible Take 2,500 mcg by mouth Daily.     • estradiol (ESTRACE) 0.1 MG/GM vaginal cream Insert 1 applicator into the vagina As Needed.     • levothyroxine (SYNTHROID, LEVOTHROID) 25 MCG tablet TAKE 1 TABLET EVERY DAY 90 tablet 0   • pravastatin (PRAVACHOL) 40 MG tablet TAKE 1 TABLET EVERY DAY 90 tablet 1   • umeclidinium-vilanterol (ANORO ELLIPTA) 62.5-25 MCG/INH aerosol powder  inhaler Inhale 1 puff Daily. 1 each 0     No facility-administered medications prior to visit.        Patient Active Problem List   Diagnosis   • Arcus senilis of both corneas   • Cystocele   • Epiphora due to insufficient drainage of right side   • Incomplete emptying of bladder   • OAB (overactive bladder)   • Senile cataracts of both eyes   • Uterovaginal prolapse, incomplete   • Hypothyroidism   • Hyperlipidemia   • Prediabetes   • Leg cramps   • Vertigo   • Hypotension due to hypovolemia       Advanced Care Planning:  ACP discussion was held with the patient during this visit. Patient has an advance directive (not in EMR), copy requested.    Review of Systems    Compared to one year ago, the patient feels her physical health is the same.  Compared to one year ago, the patient feels her mental health is worse.    Reviewed chart for potential of high risk medication in the elderly: yes  Reviewed chart for potential of harmful drug interactions in the elderly:yes    Objective         Vitals:    12/28/20 1038   BP: 98/64   Pulse: 72   Resp: 18   Temp: 98.3 °F (36.8 °C)   TempSrc: Infrared  "  SpO2: 99%   Weight: 61.7 kg (136 lb)   Height: 160 cm (62.99\")       Body mass index is 24.1 kg/m².  Discussed the patient's BMI with her. The BMI is in the acceptable range.    Physical Exam          Assessment/Plan   Medicare Risks and Personalized Health Plan  CMS Preventative Services Quick Reference  Cardiovascular risk    The above risks/problems have been discussed with the patient.  Pertinent information has been shared with the patient in the After Visit Summary.  Follow up plans and orders are seen below in the Assessment/Plan Section.    Diagnoses and all orders for this visit:    1. Medicare annual wellness visit, subsequent (Primary)    2. Healthcare maintenance  -     CBC & Differential  -     Comprehensive Metabolic Panel  -     Hemoglobin A1c  -     Thyroid Panel With TSH  -     Lipid Panel With LDL / HDL Ratio  -     CBC Auto Differential    3. Need for pneumococcal vaccination  -     Pneumococcal Polysaccharide Vaccine 23-Valent Greater Than or Equal To 1yo Subcutaneous / IM      Follow Up:  No follow-ups on file.     An After Visit Summary and PPPS were given to the patient.             "

## 2020-12-30 DIAGNOSIS — E78.5 HYPERLIPIDEMIA, UNSPECIFIED HYPERLIPIDEMIA TYPE: ICD-10-CM

## 2020-12-31 RX ORDER — PRAVASTATIN SODIUM 40 MG
TABLET ORAL
Qty: 90 TABLET | Refills: 1 | Status: SHIPPED | OUTPATIENT
Start: 2020-12-31 | End: 2021-01-05

## 2021-01-05 DIAGNOSIS — E78.5 HYPERLIPIDEMIA, UNSPECIFIED HYPERLIPIDEMIA TYPE: Primary | ICD-10-CM

## 2021-01-05 RX ORDER — ROSUVASTATIN CALCIUM 20 MG/1
20 TABLET, COATED ORAL DAILY
Qty: 90 TABLET | Refills: 3 | Status: SHIPPED | OUTPATIENT
Start: 2021-01-05 | End: 2021-03-30 | Stop reason: SDUPTHER

## 2021-03-02 DIAGNOSIS — Z23 IMMUNIZATION DUE: ICD-10-CM

## 2021-03-30 DIAGNOSIS — E78.5 HYPERLIPIDEMIA, UNSPECIFIED HYPERLIPIDEMIA TYPE: ICD-10-CM

## 2021-03-30 NOTE — TELEPHONE ENCOUNTER
Caller: Penny Brown    Relationship: Self    Best call back number: [phone]    Medication needed:   Requested Prescriptions     Pending Prescriptions Disp Refills   • rosuvastatin (Crestor) 20 MG tablet 90 tablet 3     Sig: Take 1 tablet by mouth Daily.       When do you need the refill by: 4/5/2021        Does the patient have less than a 3 day supply:  [] Yes  [x] No    What is the patient's preferred pharmacy: [facility] - [address] [phone] [facility] [phone]

## 2021-03-31 RX ORDER — ROSUVASTATIN CALCIUM 20 MG/1
20 TABLET, COATED ORAL DAILY
Qty: 90 TABLET | Refills: 3 | Status: SHIPPED | OUTPATIENT
Start: 2021-03-31 | End: 2021-12-27

## 2021-05-14 ENCOUNTER — HOSPITAL ENCOUNTER (OUTPATIENT)
Dept: GENERAL RADIOLOGY | Facility: HOSPITAL | Age: 71
Discharge: HOME | End: 2021-05-14

## 2021-05-14 ENCOUNTER — LAB (OUTPATIENT)
Dept: LAB | Facility: HOSPITAL | Age: 71
End: 2021-05-14

## 2021-05-14 ENCOUNTER — OFFICE VISIT (OUTPATIENT)
Dept: INTERNAL MEDICINE | Facility: CLINIC | Age: 71
End: 2021-05-14

## 2021-05-14 VITALS
HEIGHT: 63 IN | OXYGEN SATURATION: 98 % | SYSTOLIC BLOOD PRESSURE: 110 MMHG | DIASTOLIC BLOOD PRESSURE: 70 MMHG | HEART RATE: 68 BPM | RESPIRATION RATE: 16 BRPM | WEIGHT: 137.2 LBS | BODY MASS INDEX: 24.31 KG/M2

## 2021-05-14 DIAGNOSIS — E07.9 THYROID DISEASE: ICD-10-CM

## 2021-05-14 DIAGNOSIS — R10.30 LOWER ABDOMINAL PAIN: Primary | ICD-10-CM

## 2021-05-14 LAB
ALBUMIN SERPL-MCNC: 4 G/DL (ref 3.5–5.2)
ALBUMIN/GLOB SERPL: 1.4 G/DL
ALP SERPL-CCNC: 61 U/L (ref 39–117)
ALT SERPL W P-5'-P-CCNC: 20 U/L (ref 1–33)
AMYLASE SERPL-CCNC: 54 U/L (ref 28–100)
ANION GAP SERPL CALCULATED.3IONS-SCNC: 9.8 MMOL/L (ref 5–15)
AST SERPL-CCNC: 34 U/L (ref 1–32)
BASOPHILS # BLD AUTO: 0.02 10*3/MM3 (ref 0–0.2)
BASOPHILS NFR BLD AUTO: 0.5 % (ref 0–1.5)
BILIRUB SERPL-MCNC: 0.4 MG/DL (ref 0–1.2)
BUN SERPL-MCNC: 19 MG/DL (ref 8–23)
BUN/CREAT SERPL: 26.8 (ref 7–25)
CALCIUM SPEC-SCNC: 9.4 MG/DL (ref 8.6–10.5)
CHLORIDE SERPL-SCNC: 104 MMOL/L (ref 98–107)
CO2 SERPL-SCNC: 27.2 MMOL/L (ref 22–29)
CREAT SERPL-MCNC: 0.71 MG/DL (ref 0.57–1)
DEPRECATED RDW RBC AUTO: 41.6 FL (ref 37–54)
EOSINOPHIL # BLD AUTO: 0.08 10*3/MM3 (ref 0–0.4)
EOSINOPHIL NFR BLD AUTO: 1.8 % (ref 0.3–6.2)
ERYTHROCYTE [DISTWIDTH] IN BLOOD BY AUTOMATED COUNT: 12 % (ref 12.3–15.4)
GFR SERPL CREATININE-BSD FRML MDRD: 81 ML/MIN/1.73
GLOBULIN UR ELPH-MCNC: 2.9 GM/DL
GLUCOSE SERPL-MCNC: 92 MG/DL (ref 65–99)
HCT VFR BLD AUTO: 38.3 % (ref 34–46.6)
HGB BLD-MCNC: 12.9 G/DL (ref 12–15.9)
IMM GRANULOCYTES # BLD AUTO: 0.01 10*3/MM3 (ref 0–0.05)
IMM GRANULOCYTES NFR BLD AUTO: 0.2 % (ref 0–0.5)
LIPASE SERPL-CCNC: 29 U/L (ref 13–60)
LYMPHOCYTES # BLD AUTO: 1.48 10*3/MM3 (ref 0.7–3.1)
LYMPHOCYTES NFR BLD AUTO: 33.4 % (ref 19.6–45.3)
MCH RBC QN AUTO: 31.5 PG (ref 26.6–33)
MCHC RBC AUTO-ENTMCNC: 33.7 G/DL (ref 31.5–35.7)
MCV RBC AUTO: 93.4 FL (ref 79–97)
MONOCYTES # BLD AUTO: 0.41 10*3/MM3 (ref 0.1–0.9)
MONOCYTES NFR BLD AUTO: 9.3 % (ref 5–12)
NEUTROPHILS NFR BLD AUTO: 2.43 10*3/MM3 (ref 1.7–7)
NEUTROPHILS NFR BLD AUTO: 54.8 % (ref 42.7–76)
NRBC BLD AUTO-RTO: 0 /100 WBC (ref 0–0.2)
PLATELET # BLD AUTO: 241 10*3/MM3 (ref 140–450)
PMV BLD AUTO: 10 FL (ref 6–12)
POTASSIUM SERPL-SCNC: 3.9 MMOL/L (ref 3.5–5.2)
PROT SERPL-MCNC: 6.9 G/DL (ref 6–8.5)
RBC # BLD AUTO: 4.1 10*6/MM3 (ref 3.77–5.28)
SODIUM SERPL-SCNC: 141 MMOL/L (ref 136–145)
WBC # BLD AUTO: 4.43 10*3/MM3 (ref 3.4–10.8)

## 2021-05-14 PROCEDURE — 80053 COMPREHEN METABOLIC PANEL: CPT | Performed by: FAMILY MEDICINE

## 2021-05-14 PROCEDURE — 99214 OFFICE O/P EST MOD 30 MIN: CPT | Performed by: FAMILY MEDICINE

## 2021-05-14 PROCEDURE — 82150 ASSAY OF AMYLASE: CPT | Performed by: FAMILY MEDICINE

## 2021-05-14 PROCEDURE — 83690 ASSAY OF LIPASE: CPT | Performed by: FAMILY MEDICINE

## 2021-05-14 PROCEDURE — 74018 RADEX ABDOMEN 1 VIEW: CPT

## 2021-05-14 PROCEDURE — 36415 COLL VENOUS BLD VENIPUNCTURE: CPT | Performed by: FAMILY MEDICINE

## 2021-05-14 PROCEDURE — 85025 COMPLETE CBC W/AUTO DIFF WBC: CPT | Performed by: FAMILY MEDICINE

## 2021-05-14 RX ORDER — CLOBETASOL PROPIONATE 0.5 MG/G
CREAM TOPICAL DAILY
COMMUNITY
Start: 2020-12-29

## 2021-05-14 RX ORDER — DICYCLOMINE HYDROCHLORIDE 10 MG/1
10 CAPSULE ORAL
Qty: 60 CAPSULE | Refills: 0 | Status: SHIPPED | OUTPATIENT
Start: 2021-05-14 | End: 2022-01-04

## 2021-05-14 RX ORDER — OMEPRAZOLE 40 MG/1
40 CAPSULE, DELAYED RELEASE ORAL DAILY
Qty: 30 CAPSULE | Refills: 0 | Status: SHIPPED | OUTPATIENT
Start: 2021-05-14 | End: 2022-05-09 | Stop reason: SDUPTHER

## 2021-05-14 RX ORDER — LEVOTHYROXINE SODIUM 0.03 MG/1
25 TABLET ORAL DAILY
Qty: 90 TABLET | Refills: 2 | Status: SHIPPED | OUTPATIENT
Start: 2021-05-14 | End: 2021-12-27

## 2021-05-14 RX ORDER — DICYCLOMINE HYDROCHLORIDE 10 MG/1
10 CAPSULE ORAL
Qty: 60 CAPSULE | Refills: 0 | Status: SHIPPED | OUTPATIENT
Start: 2021-05-14 | End: 2021-05-14 | Stop reason: SDUPTHER

## 2021-05-15 NOTE — PROGRESS NOTES
"Chief Complaint  Abdominal Pain (Lower pain )      Subjective          Penny Brown presents to [facility]  History of Present Illness    Patient notes that for the last two weeks, she has some bloadting and gas. She denies diarreha. She has been using some gas-x. She also notes that she does have some constipation which has gotten better. She denies any pain currently. She did have pain which seemed to have resolved and was located in left lower abdomen.     Patient has hypothyroidism, and is currently taking levothyroxine 25mcg daily. She is stable on this medication. She denies any side effects of the medicine.     Objective   Vital Signs:   /70   Pulse 68   Resp 16   Ht 160 cm (62.99\")   Wt 62.2 kg (137 lb 3.2 oz)   SpO2 98%   BMI 24.31 kg/m²     Physical Exam  Vitals and nursing note reviewed.   Constitutional:       Appearance: She is well-developed.   HENT:      Head: Normocephalic and atraumatic.      Right Ear: External ear normal.      Left Ear: External ear normal.   Cardiovascular:      Rate and Rhythm: Normal rate and regular rhythm.      Heart sounds: Normal heart sounds.   Pulmonary:      Effort: Pulmonary effort is normal. No respiratory distress.      Breath sounds: Normal breath sounds.   Abdominal:      Palpations: Abdomen is soft.      Tenderness: There is no abdominal tenderness. There is no guarding.   Musculoskeletal:         General: Normal range of motion.      Cervical back: Normal range of motion and neck supple.   Lymphadenopathy:      Cervical: No cervical adenopathy.   Skin:     General: Skin is warm.   Neurological:      Mental Status: She is alert and oriented to person, place, and time.   Psychiatric:         Behavior: Behavior normal.        Result Review :                 Assessment and Plan    Diagnoses and all orders for this visit:    1. Lower abdominal pain (Primary)  -     XR Abdomen KUB  -     CBC & Differential  -     " Comprehensive Metabolic Panel  -     Lipase  -     Amylase  -     We will start patient on Bentyl and Prilosec.  We will order several labs.  We will also get an x-ray of the abdomen to see if she has any stool burden or any other abnormal findings.  -     dicyclomine (Bentyl) 10 MG capsule; Take 1 capsule by mouth 4 (Four) Times a Day Before Meals & at Bedtime.  Dispense: 60 capsule; Refill: 0  -     omeprazole (priLOSEC) 40 MG capsule; Take 1 capsule by mouth Daily.  Dispense: 30 capsule; Refill: 0    2. Thyroid disease  -     levothyroxine (SYNTHROID, LEVOTHROID) 25 MCG tablet; Take 1 tablet by mouth Daily.  Dispense: 90 tablet; Refill: 2  -     Continue levothyroxine 25 mcg daily.      Will continue       Follow Up   No follow-ups on file.  Patient was given instructions and counseling regarding her condition or for health maintenance advice. Please see specific information pulled into the AVS if appropriate.

## 2021-05-16 NOTE — PROGRESS NOTES
The labs were reviewed. Please inform patient that labs were normal. Has constipation which could be the cause of the pain. Needs to do metamucil daily. Recommend Carolina PRYOR

## 2021-05-16 NOTE — PROGRESS NOTES
Please inform the patient of the following abnormal results. AST slightly elevated. Will recheck on next visit. All other labs normal. Cause of pain unknown based on these normal labs.

## 2021-05-18 ENCOUNTER — TELEPHONE (OUTPATIENT)
Dept: INTERNAL MEDICINE | Facility: CLINIC | Age: 71
End: 2021-05-18

## 2021-05-18 NOTE — TELEPHONE ENCOUNTER
Caller: Penny Brown    Relationship: Self    Best call back number: [phone]     What is the best time to reach you: AMYTIME    Who are you requesting to speak with MA OR DOCTOR          What was the call regarding: PATIENT STATES SHE WOULD LIKE CALLBACK TO DISCUSS LAB RESULTS. ALSO WANTS TO KNOW IF ONE OF THE MEDICATION SHE WAS PRESCRIBED WAS A ANTIBIOTIC BECAUSE SHE WENT TO GIVE BLOOD AT [facility] AND THEY TOLD HER TO MAKE SURE NO ANTIBIOTICS AND IF NO FURTHER TESTING WAS GOING TO BE NEEDED SO SHE CAN COME BACK AND GIVE.    Do you require a callback:  YES

## 2021-05-18 NOTE — TELEPHONE ENCOUNTER
Pt was sent a MyChart letter this morning with results.  She has not been prescribed any antibiotics.

## 2021-06-28 ENCOUNTER — HOSPITAL ENCOUNTER (OUTPATIENT)
Dept: GENERAL RADIOLOGY | Facility: HOSPITAL | Age: 71
Discharge: HOME | End: 2021-06-28

## 2021-06-28 ENCOUNTER — LAB (OUTPATIENT)
Dept: LAB | Facility: HOSPITAL | Age: 71
End: 2021-06-28

## 2021-06-28 ENCOUNTER — OFFICE VISIT (OUTPATIENT)
Dept: INTERNAL MEDICINE | Facility: CLINIC | Age: 71
End: 2021-06-28

## 2021-06-28 VITALS
HEART RATE: 70 BPM | DIASTOLIC BLOOD PRESSURE: 80 MMHG | OXYGEN SATURATION: 98 % | RESPIRATION RATE: 16 BRPM | BODY MASS INDEX: 24.26 KG/M2 | WEIGHT: 136.9 LBS | SYSTOLIC BLOOD PRESSURE: 116 MMHG

## 2021-06-28 DIAGNOSIS — S20.212A CHEST WALL CONTUSION, LEFT, INITIAL ENCOUNTER: Primary | ICD-10-CM

## 2021-06-28 DIAGNOSIS — E78.5 HYPERLIPIDEMIA, UNSPECIFIED HYPERLIPIDEMIA TYPE: ICD-10-CM

## 2021-06-28 DIAGNOSIS — E03.9 HYPOTHYROIDISM, UNSPECIFIED TYPE: ICD-10-CM

## 2021-06-28 LAB
ALBUMIN SERPL-MCNC: 4.2 G/DL (ref 3.5–5.2)
ALBUMIN/GLOB SERPL: 1.6 G/DL
ALP SERPL-CCNC: 54 U/L (ref 39–117)
ALT SERPL W P-5'-P-CCNC: 20 U/L (ref 1–33)
ANION GAP SERPL CALCULATED.3IONS-SCNC: 6.8 MMOL/L (ref 5–15)
AST SERPL-CCNC: 36 U/L (ref 1–32)
BILIRUB SERPL-MCNC: 0.6 MG/DL (ref 0–1.2)
BUN SERPL-MCNC: 9 MG/DL (ref 8–23)
BUN/CREAT SERPL: 11.3 (ref 7–25)
CALCIUM SPEC-SCNC: 9.3 MG/DL (ref 8.6–10.5)
CHLORIDE SERPL-SCNC: 106 MMOL/L (ref 98–107)
CHOLEST SERPL-MCNC: 153 MG/DL (ref 0–200)
CO2 SERPL-SCNC: 28.2 MMOL/L (ref 22–29)
CREAT SERPL-MCNC: 0.8 MG/DL (ref 0.57–1)
GFR SERPL CREATININE-BSD FRML MDRD: 71 ML/MIN/1.73
GLOBULIN UR ELPH-MCNC: 2.7 GM/DL
GLUCOSE SERPL-MCNC: 86 MG/DL (ref 65–99)
HDLC SERPL-MCNC: 59 MG/DL (ref 40–60)
LDLC SERPL CALC-MCNC: 75 MG/DL (ref 0–100)
LDLC/HDLC SERPL: 1.25 {RATIO}
POTASSIUM SERPL-SCNC: 4 MMOL/L (ref 3.5–5.2)
PROT SERPL-MCNC: 6.9 G/DL (ref 6–8.5)
SODIUM SERPL-SCNC: 141 MMOL/L (ref 136–145)
T-UPTAKE NFR SERPL: 1.13 TBI (ref 0.8–1.3)
T4 SERPL-MCNC: 8.37 MCG/DL (ref 4.5–11.7)
TRIGL SERPL-MCNC: 102 MG/DL (ref 0–150)
TSH SERPL DL<=0.05 MIU/L-ACNC: 2.79 UIU/ML (ref 0.27–4.2)
VLDLC SERPL-MCNC: 19 MG/DL (ref 5–40)

## 2021-06-28 PROCEDURE — 80061 LIPID PANEL: CPT | Performed by: FAMILY MEDICINE

## 2021-06-28 PROCEDURE — 99214 OFFICE O/P EST MOD 30 MIN: CPT | Performed by: FAMILY MEDICINE

## 2021-06-28 PROCEDURE — 36415 COLL VENOUS BLD VENIPUNCTURE: CPT | Performed by: FAMILY MEDICINE

## 2021-06-28 PROCEDURE — 71101 X-RAY EXAM UNILAT RIBS/CHEST: CPT

## 2021-06-28 PROCEDURE — 84436 ASSAY OF TOTAL THYROXINE: CPT | Performed by: FAMILY MEDICINE

## 2021-06-28 PROCEDURE — 80053 COMPREHEN METABOLIC PANEL: CPT | Performed by: FAMILY MEDICINE

## 2021-06-28 PROCEDURE — 84479 ASSAY OF THYROID (T3 OR T4): CPT | Performed by: FAMILY MEDICINE

## 2021-06-28 PROCEDURE — 84443 ASSAY THYROID STIM HORMONE: CPT | Performed by: FAMILY MEDICINE

## 2021-06-28 RX ORDER — CALCIUM CARB/VITAMIN D3/VIT K1 650MG-12.5
1 TABLET,CHEWABLE ORAL 2 TIMES DAILY
COMMUNITY

## 2021-06-28 RX ORDER — MULTIPLE VITAMINS W/ MINERALS TAB 9MG-400MCG
1 TAB ORAL DAILY
COMMUNITY

## 2021-06-28 NOTE — PROGRESS NOTES
"Chief Complaint  Hyperlipidemia    Subjective          Penny Brown presents to [facility]  History of Present Illness    Patient presents at today's office visit with a past medical history of having hyperlipidemia.  She is currently taking Crestor 20 mg daily.  She denies any side effects of the medication.    Patient also has hypothyroidism.  She is currently on levothyroxine 25 mcg daily.  She denies any side effects of the medicine.    She notes about 2 weeks ago, she was getting something out of her station [employer], and she had called in and the particular angle where she believes she put a lot of pressure on her left chest wall.  During the process, she did feel as if \"something shifted\".  She is not sure if she heard any pops or cracks, but she did have a lot of pain for about a week after, and the pain started to slowly decrease.  Today her pain is minimal, however it could be concerning because she had a hard time breathing during the time that she had a lot of pain.  This is all related to her being in a bad angle as she was crawling out of her station [employer].    Objective   Vital Signs:   /80   Pulse 70   Resp 16   Wt 62.1 kg (136 lb 14.4 oz)   SpO2 98%   BMI 24.26 kg/m²     Physical Exam  Vitals and nursing note reviewed.   Constitutional:       Appearance: She is well-developed.   HENT:      Head: Normocephalic and atraumatic.   Chest:       Musculoskeletal:      Cervical back: Normal range of motion and neck supple.   Neurological:      Mental Status: She is alert and oriented to person, place, and time.   Psychiatric:         Behavior: Behavior normal.        Result Review :                 Assessment and Plan    Diagnoses and all orders for this visit:    1. Chest wall contusion, left, initial encounter (Primary)  -     XR Ribs Left With PA Chest    2. Hyperlipidemia, unspecified hyperlipidemia type  -     Comprehensive Metabolic Panel  -     Lipid Panel With " LDL / HDL Ratio    3. Hypothyroidism, unspecified type  -     Thyroid Panel With TSH      Today's office visit I discussed with patient that we will get a left rib x-rays.  Also discussed with patient that if it were fractured, there is not much that will be different as far as management will be concerned except pain being better managed with pain medication.  She has no trouble breathing at today's visit.  As for hyperlipidemia, will continue on Crestor 20 mg daily.  For hypothyroidism, continue levothyroxine 25 mcg daily.      Follow Up   No follow-ups on file.  Patient was given instructions and counseling regarding her condition or for health maintenance advice. Please see specific information pulled into the AVS if appropriate.

## 2021-09-07 ENCOUNTER — OFFICE VISIT (OUTPATIENT)
Dept: INTERNAL MEDICINE | Facility: CLINIC | Age: 71
End: 2021-09-07

## 2021-09-07 VITALS
OXYGEN SATURATION: 97 % | DIASTOLIC BLOOD PRESSURE: 64 MMHG | RESPIRATION RATE: 16 BRPM | HEART RATE: 72 BPM | BODY MASS INDEX: 24.1 KG/M2 | WEIGHT: 136 LBS | SYSTOLIC BLOOD PRESSURE: 98 MMHG

## 2021-09-07 DIAGNOSIS — I95.9 HYPOTENSION, UNSPECIFIED HYPOTENSION TYPE: ICD-10-CM

## 2021-09-07 DIAGNOSIS — R42 DIZZINESS: Primary | ICD-10-CM

## 2021-09-07 PROCEDURE — 99214 OFFICE O/P EST MOD 30 MIN: CPT | Performed by: FAMILY MEDICINE

## 2021-09-07 NOTE — PROGRESS NOTES
Chief Complaint  Hyperlipidemia    Subjective          Penny Brown presents to [facility]  History of Present Illness    Patient states that on Friday morning she woke up and she had felt very dizzy.  She stated that she checked her blood pressure, and it was 81/65.  She stated that she has spoken to a medical liaison and a work, and it was suggested for her to try to drink some V8.  Which she did, and stated that she felt better.  She does state that she is drinking plenty of fluids, she states she is getting about 64 ounces of water in a day.  She has checked her blood pressure regularly since this incident, and states that her blood pressure has been normal.  At today's office visit blood pressure 98/64 and she feels well.  She is continue to drink V8 every day.    Objective   Vital Signs:   BP 98/64   Pulse 72   Resp 16   Wt 61.7 kg (136 lb)   SpO2 97%   BMI 24.10 kg/m²     Physical Exam  Vitals and nursing note reviewed.   Constitutional:       Appearance: She is well-developed.   HENT:      Head: Normocephalic and atraumatic.   Musculoskeletal:      Cervical back: Normal range of motion and neck supple.   Neurological:      Mental Status: She is alert and oriented to person, place, and time.   Psychiatric:         Behavior: Behavior normal.        Result Review :                 Assessment and Plan    Diagnoses and all orders for this visit:    1. Dizziness (Primary)    2. Hypotension, unspecified hypotension type      Discussed with him to drink plenty of fluids.  Also discussed with her she can drink V8.  She should also continue to monitor blood pressures.  She is currently does not have any blood pressure medication which can lower her blood pressure.  She just may not be drinking enough fluids and/or having no salt in her food.  IF drinking the V8 seem to help her, I am okay with her doing that.  Is currently doing better at today's visit.      Follow Up   No  follow-ups on file.  Patient was given instructions and counseling regarding her condition or for health maintenance advice. Please see specific information pulled into the AVS if appropriate.

## 2021-09-16 ENCOUNTER — TELEPHONE (OUTPATIENT)
Dept: INTERNAL MEDICINE | Facility: CLINIC | Age: 71
End: 2021-09-16

## 2021-09-16 NOTE — TELEPHONE ENCOUNTER
Caller: Penny Brown    Relationship: Self    Best call back number: [phone]    What medication are you requesting: NITROFURANTOIN MCR 100MCG, TAKE 1 TABLET BY MOUTH TWICE DAILY FOR 5 DAYS     What are your current symptoms: PAINFUL URINATION AND URGENCY    How long have you been experiencing symptoms: SEVERAL DAYS    Have you had these symptoms before:    [x] Yes  [] No    Have you been treated for these symptoms before:   [x] Yes  [] No    If a prescription is needed, what is your preferred pharmacy and phone number: [id number] - [place], [place] - [address] [address] AT [facility] [address]  - [phone]  - [phone] FX     Additional notes: PATIENT STATES THAT APPROXIMATELY ONE MONTH AGO SHE WAS SEEN AT [facility] FOR A

## 2021-09-17 RX ORDER — NITROFURANTOIN 25; 75 MG/1; MG/1
100 CAPSULE ORAL 2 TIMES DAILY
Qty: 14 CAPSULE | Refills: 0 | Status: SHIPPED | OUTPATIENT
Start: 2021-09-17 | End: 2021-09-24

## 2021-09-17 NOTE — TELEPHONE ENCOUNTER
I have sent this in.  I do believe she may have already gotten this from the urgent care as she went there today.

## 2021-10-05 ENCOUNTER — TELEPHONE (OUTPATIENT)
Dept: INTERNAL MEDICINE | Facility: CLINIC | Age: 71
End: 2021-10-05

## 2021-10-05 NOTE — TELEPHONE ENCOUNTER
Caller: Penny Brown    Relationship to patient: Self    Best call back number: [phone]    Patient is needing: PATIENT WOULD LIKE TO KNOW IF SHE SHOULD GET THE MIDERNA BOOSTER VACCINE, PLEASE REACH OUT AND ADVISE

## 2021-10-11 ENCOUNTER — TRANSCRIBE ORDERS (OUTPATIENT)
Dept: ADMINISTRATIVE | Facility: HOSPITAL | Age: 71
End: 2021-10-11

## 2021-10-11 DIAGNOSIS — Z12.31 VISIT FOR SCREENING MAMMOGRAM: Primary | ICD-10-CM

## 2021-12-14 ENCOUNTER — HOSPITAL ENCOUNTER (OUTPATIENT)
Dept: MAMMOGRAPHY | Facility: HOSPITAL | Age: 71
Discharge: HOME | End: 2021-12-14
Admitting: FAMILY MEDICINE

## 2021-12-14 DIAGNOSIS — Z12.31 VISIT FOR SCREENING MAMMOGRAM: ICD-10-CM

## 2021-12-14 PROCEDURE — 77067 SCR MAMMO BI INCL CAD: CPT

## 2021-12-14 PROCEDURE — 77063 BREAST TOMOSYNTHESIS BI: CPT

## 2021-12-22 NOTE — PROGRESS NOTES
IMPRESSION:  1. There is no evidence for malignancy or significant change in either  breast. Routine followup mammography is recommended.    BI-RADS category 1: Negative.

## 2021-12-26 DIAGNOSIS — E07.9 THYROID DISEASE: ICD-10-CM

## 2021-12-27 DIAGNOSIS — E78.5 HYPERLIPIDEMIA, UNSPECIFIED HYPERLIPIDEMIA TYPE: ICD-10-CM

## 2021-12-27 RX ORDER — LEVOTHYROXINE SODIUM 0.03 MG/1
25 TABLET ORAL DAILY
Qty: 90 TABLET | Refills: 1 | Status: SHIPPED | OUTPATIENT
Start: 2021-12-27 | End: 2022-05-05

## 2021-12-27 RX ORDER — ROSUVASTATIN CALCIUM 20 MG/1
20 TABLET, COATED ORAL DAILY
Qty: 90 TABLET | Refills: 1 | Status: SHIPPED | OUTPATIENT
Start: 2021-12-27 | End: 2022-05-11

## 2021-12-29 ENCOUNTER — TELEPHONE (OUTPATIENT)
Dept: INTERNAL MEDICINE | Facility: CLINIC | Age: 71
End: 2021-12-29

## 2021-12-29 RX ORDER — ALBUTEROL SULFATE 90 UG/1
2 AEROSOL, METERED RESPIRATORY (INHALATION) EVERY 4 HOURS PRN
Qty: 18 G | Refills: 1 | Status: SHIPPED | OUTPATIENT
Start: 2021-12-29 | End: 2022-01-04

## 2021-12-29 RX ORDER — AZITHROMYCIN 250 MG/1
TABLET, FILM COATED ORAL
Qty: 6 TABLET | Refills: 0 | Status: SHIPPED | OUTPATIENT
Start: 2021-12-29 | End: 2022-01-04

## 2021-12-29 NOTE — TELEPHONE ENCOUNTER
Caller: Penny Brown    Relationship: Self    Best call back number: [phone]    What medication are you requesting: SOMETHING FOR UPPER RESPIRATORY/BRONCHITIS    What are your current symptoms: COUGHING, NO FEVER- WENT TO THE URGENT CARE ON 12/18 AND WAS DIAGNOSED WITH UPPER RESPIRATORY INFECTION.  THEY ADVISED HER TO TAKE AN OTC MEDICATION FOR THE COUGH AND SYMPTOMS.  PATIENT HAS TAKEN 2 AT HOME COVID TESTS AND BOTH WERE NEGATIVE.    How long have you been experiencing symptoms: ALMOST 2 WEEKS    Have you had these symptoms before:    [x] Yes  [] No    Have you been treated for these symptoms before:   [x] Yes  [] No    If a prescription is needed, what is your preferred pharmacy and phone number: [id number] - [place], [place] - [address] [address] AT [facility] [address] [address]  - [phone]  - [phone] FX     Additional notes:    PLEASE CONTACT PATIENT

## 2022-01-04 ENCOUNTER — OFFICE VISIT (OUTPATIENT)
Dept: INTERNAL MEDICINE | Facility: CLINIC | Age: 72
End: 2022-01-04

## 2022-01-04 ENCOUNTER — LAB (OUTPATIENT)
Dept: LAB | Facility: HOSPITAL | Age: 72
End: 2022-01-04

## 2022-01-04 VITALS
WEIGHT: 141 LBS | HEART RATE: 73 BPM | DIASTOLIC BLOOD PRESSURE: 82 MMHG | BODY MASS INDEX: 24.98 KG/M2 | OXYGEN SATURATION: 99 % | SYSTOLIC BLOOD PRESSURE: 102 MMHG | HEIGHT: 63 IN | TEMPERATURE: 97.3 F

## 2022-01-04 DIAGNOSIS — Z00.00 HEALTHCARE MAINTENANCE: ICD-10-CM

## 2022-01-04 DIAGNOSIS — Z00.00 WELL WOMAN EXAM (NO GYNECOLOGICAL EXAM): Primary | ICD-10-CM

## 2022-01-04 DIAGNOSIS — Z09 ENCOUNTER FOR FOLLOW-UP EXAMINATION AFTER COMPLETED TREATMENT FOR CONDITIONS OTHER THAN MALIGNANT NEOPLASM: ICD-10-CM

## 2022-01-04 DIAGNOSIS — Z00.00 MEDICARE ANNUAL WELLNESS VISIT, SUBSEQUENT: ICD-10-CM

## 2022-01-04 DIAGNOSIS — Z13.820 SCREENING FOR OSTEOPOROSIS: ICD-10-CM

## 2022-01-04 LAB
ALBUMIN SERPL-MCNC: 4.3 G/DL (ref 3.5–5.2)
ALBUMIN/GLOB SERPL: 1.7 G/DL
ALP SERPL-CCNC: 57 U/L (ref 39–117)
ALT SERPL W P-5'-P-CCNC: 22 U/L (ref 1–33)
ANION GAP SERPL CALCULATED.3IONS-SCNC: 7.8 MMOL/L (ref 5–15)
AST SERPL-CCNC: 32 U/L (ref 1–32)
BASOPHILS # BLD AUTO: 0.01 10*3/MM3 (ref 0–0.2)
BASOPHILS NFR BLD AUTO: 0.2 % (ref 0–1.5)
BILIRUB SERPL-MCNC: 0.5 MG/DL (ref 0–1.2)
BUN SERPL-MCNC: 11 MG/DL (ref 8–23)
BUN/CREAT SERPL: 16.2 (ref 7–25)
CALCIUM SPEC-SCNC: 9.9 MG/DL (ref 8.6–10.5)
CHLORIDE SERPL-SCNC: 104 MMOL/L (ref 98–107)
CHOLEST SERPL-MCNC: 165 MG/DL (ref 0–200)
CO2 SERPL-SCNC: 29.2 MMOL/L (ref 22–29)
CREAT SERPL-MCNC: 0.68 MG/DL (ref 0.57–1)
DEPRECATED RDW RBC AUTO: 40.7 FL (ref 37–54)
EOSINOPHIL # BLD AUTO: 0.05 10*3/MM3 (ref 0–0.4)
EOSINOPHIL NFR BLD AUTO: 1.1 % (ref 0.3–6.2)
ERYTHROCYTE [DISTWIDTH] IN BLOOD BY AUTOMATED COUNT: 12 % (ref 12.3–15.4)
GFR SERPL CREATININE-BSD FRML MDRD: 85 ML/MIN/1.73
GLOBULIN UR ELPH-MCNC: 2.6 GM/DL
GLUCOSE SERPL-MCNC: 81 MG/DL (ref 65–99)
HBA1C MFR BLD: 5.69 % (ref 4.8–5.6)
HCT VFR BLD AUTO: 40.1 % (ref 34–46.6)
HDLC SERPL-MCNC: 62 MG/DL (ref 40–60)
HGB BLD-MCNC: 13.5 G/DL (ref 12–15.9)
IMM GRANULOCYTES # BLD AUTO: 0.01 10*3/MM3 (ref 0–0.05)
IMM GRANULOCYTES NFR BLD AUTO: 0.2 % (ref 0–0.5)
LDLC SERPL CALC-MCNC: 74 MG/DL (ref 0–100)
LDLC/HDLC SERPL: 1.1 {RATIO}
LYMPHOCYTES # BLD AUTO: 1.55 10*3/MM3 (ref 0.7–3.1)
LYMPHOCYTES NFR BLD AUTO: 34.2 % (ref 19.6–45.3)
MCH RBC QN AUTO: 31.3 PG (ref 26.6–33)
MCHC RBC AUTO-ENTMCNC: 33.7 G/DL (ref 31.5–35.7)
MCV RBC AUTO: 92.8 FL (ref 79–97)
MONOCYTES # BLD AUTO: 0.4 10*3/MM3 (ref 0.1–0.9)
MONOCYTES NFR BLD AUTO: 8.8 % (ref 5–12)
NEUTROPHILS NFR BLD AUTO: 2.51 10*3/MM3 (ref 1.7–7)
NEUTROPHILS NFR BLD AUTO: 55.5 % (ref 42.7–76)
NRBC BLD AUTO-RTO: 0 /100 WBC (ref 0–0.2)
PLATELET # BLD AUTO: 220 10*3/MM3 (ref 140–450)
PMV BLD AUTO: 10.1 FL (ref 6–12)
POTASSIUM SERPL-SCNC: 4.2 MMOL/L (ref 3.5–5.2)
PROT SERPL-MCNC: 6.9 G/DL (ref 6–8.5)
RBC # BLD AUTO: 4.32 10*6/MM3 (ref 3.77–5.28)
SODIUM SERPL-SCNC: 141 MMOL/L (ref 136–145)
T-UPTAKE NFR SERPL: 1.17 TBI (ref 0.8–1.3)
T4 SERPL-MCNC: 8.32 MCG/DL (ref 4.5–11.7)
TRIGL SERPL-MCNC: 175 MG/DL (ref 0–150)
TSH SERPL DL<=0.05 MIU/L-ACNC: 3.3 UIU/ML (ref 0.27–4.2)
VLDLC SERPL-MCNC: 29 MG/DL (ref 5–40)
WBC NRBC COR # BLD: 4.53 10*3/MM3 (ref 3.4–10.8)

## 2022-01-04 PROCEDURE — 1126F AMNT PAIN NOTED NONE PRSNT: CPT | Performed by: FAMILY MEDICINE

## 2022-01-04 PROCEDURE — 1170F FXNL STATUS ASSESSED: CPT | Performed by: FAMILY MEDICINE

## 2022-01-04 PROCEDURE — 99397 PER PM REEVAL EST PAT 65+ YR: CPT | Performed by: FAMILY MEDICINE

## 2022-01-04 PROCEDURE — 84443 ASSAY THYROID STIM HORMONE: CPT | Performed by: FAMILY MEDICINE

## 2022-01-04 PROCEDURE — 1159F MED LIST DOCD IN RCRD: CPT | Performed by: FAMILY MEDICINE

## 2022-01-04 PROCEDURE — G0439 PPPS, SUBSEQ VISIT: HCPCS | Performed by: FAMILY MEDICINE

## 2022-01-04 PROCEDURE — 83036 HEMOGLOBIN GLYCOSYLATED A1C: CPT | Performed by: FAMILY MEDICINE

## 2022-01-04 PROCEDURE — 84479 ASSAY OF THYROID (T3 OR T4): CPT | Performed by: FAMILY MEDICINE

## 2022-01-04 PROCEDURE — 85025 COMPLETE CBC W/AUTO DIFF WBC: CPT | Performed by: FAMILY MEDICINE

## 2022-01-04 PROCEDURE — 36415 COLL VENOUS BLD VENIPUNCTURE: CPT | Performed by: FAMILY MEDICINE

## 2022-01-04 PROCEDURE — 80061 LIPID PANEL: CPT | Performed by: FAMILY MEDICINE

## 2022-01-04 PROCEDURE — 84436 ASSAY OF TOTAL THYROXINE: CPT | Performed by: FAMILY MEDICINE

## 2022-01-04 PROCEDURE — 80053 COMPREHEN METABOLIC PANEL: CPT | Performed by: FAMILY MEDICINE

## 2022-01-04 NOTE — PROGRESS NOTES
Subjective   Penny Brown is a 71 y.o. female and is here for a comprehensive physical exam. The patient reports no problems.    Pt is UTD with annual gyn exam and mammo           Social History:   Social History     Socioeconomic History   • Marital status:    • Number of children: 1   Tobacco Use   • Smoking status: Never Smoker   • Smokeless tobacco: Never Used   Substance and Sexual Activity   • Alcohol use: Yes     Alcohol/week: 1.0 - 2.0 standard drink     Types: 1 - 2 Shots of liquor per week     Comment: 2-4/MONTH   • Drug use: No   • Sexual activity: Never       Family History:   Family History   Problem Relation Age of Onset   • Diabetes Maternal Grandmother    • Heart failure Mother    • Thyroid disease Mother    • Lung disease Brother    • Alcohol abuse Maternal Aunt    • Cancer Maternal Aunt    • Breast cancer Maternal Aunt    • Alcohol abuse Maternal Uncle    • Cancer Maternal Uncle    • Diabetes Maternal Uncle    • Ovarian cancer Neg Hx        Past Medical History:   Past Medical History:   Diagnosis Date   • Dizziness    • Fibrocystic breast    • GERD (gastroesophageal reflux disease)    • Hyperlipidemia    • Hypothyroidism    • PAD (peripheral artery disease) (HCC)    • Seizures (HCC)     as a child; LAST SEIZURE AT AGE 28   • Syncopal episodes        The following portions of the patient's history were reviewed and updated as appropriate: allergies, current medications, past family history, past medical history, past social history, past surgical history and problem list.    Review of Systems    Review of Systems   Constitutional: Negative for chills and fever.   HENT: Negative for congestion, rhinorrhea, sinus pain and sore throat.    Eyes: Negative for photophobia and visual disturbance.   Respiratory: Negative for cough, chest tightness and shortness of breath.    Cardiovascular: Negative for chest pain and palpitations.   Gastrointestinal: Negative for diarrhea, nausea and vomiting.    Genitourinary: Negative for dysuria, frequency and urgency.   Skin: Negative for rash and wound.   Neurological: Negative for dizziness and syncope.   Psychiatric/Behavioral: Negative for behavioral problems and confusion.       Objective   Physical Exam  Vitals and nursing note reviewed.   Constitutional:       Appearance: She is well-developed.   HENT:      Head: Normocephalic and atraumatic.      Right Ear: External ear normal.      Left Ear: External ear normal.   Cardiovascular:      Rate and Rhythm: Normal rate and regular rhythm.      Heart sounds: Normal heart sounds.   Pulmonary:      Effort: Pulmonary effort is normal. No respiratory distress.      Breath sounds: Normal breath sounds.   Abdominal:      Palpations: Abdomen is soft.      Tenderness: There is no abdominal tenderness. There is no guarding.   Musculoskeletal:         General: Normal range of motion.      Cervical back: Normal range of motion and neck supple.   Lymphadenopathy:      Cervical: No cervical adenopathy.   Skin:     General: Skin is warm.   Neurological:      Mental Status: She is alert and oriented to person, place, and time.   Psychiatric:         Behavior: Behavior normal.         Vitals:    01/04/22 1313   BP: 102/82   Pulse: 73   Temp: 97.3 °F (36.3 °C)   SpO2: 99%     Body mass index is 24.98 kg/m².      Medications:   Current Outpatient Medications:   •  Calcium-Vitamin D-Vitamin K (Viactiv Calcium Plus D) 650-12.5-40 MG-MCG-MCG chewable tablet, Chew 1 each 2 (two) times a day., Disp: , Rfl:   •  clobetasol (TEMOVATE) 0.05 % cream, Apply  topically to the appropriate area as directed Daily., Disp: , Rfl:   •  levothyroxine (SYNTHROID, LEVOTHROID) 25 MCG tablet, TAKE 1 TABLET BY MOUTH  DAILY, Disp: 90 tablet, Rfl: 1  •  multivitamin with minerals (Womens 50+ Multi Vitamin/Min) tablet tablet, Take 1 tablet by mouth Daily., Disp: , Rfl:   •  omeprazole (priLOSEC) 40 MG capsule, Take 1 capsule by mouth Daily., Disp: 30 capsule,  Rfl: 0  •  rosuvastatin (CRESTOR) 20 MG tablet, TAKE 1 TABLET BY MOUTH  DAILY, Disp: 90 tablet, Rfl: 1       Assessment/Plan   Healthy female exam.      1. Healthcare Maintenance:  2. Patient Counseling:  --Nutrition: Stressed importance of moderation in sodium/caffeine intake, saturated fat and cholesterol, caloric balance, sufficient intake of fresh fruits, vegetables, fiber, calcium and vit D  --Exercise: Recommended 30 minutes of exercise daily.  --Immunizations reviewed.  --Discussed benefits of screening colonoscopy.    Diagnoses and all orders for this visit:    Well woman exam (no gynecological exam)    Medicare annual wellness visit, subsequent    Healthcare maintenance  -     CBC & Differential  -     Comprehensive Metabolic Panel  -     Hemoglobin A1c  -     Thyroid Panel With TSH  -     Lipid Panel With LDL / HDL Ratio    Screening for osteoporosis  -     DEXA Bone Density Axial    Encounter for follow-up examination after completed treatment for conditions other than malignant neoplasm   -     DEXA Bone Density Axial        No follow-ups on file.             Dictated utilizing Dragon Voice Recognition Software

## 2022-01-04 NOTE — PROGRESS NOTES
The ABCs of the Annual Wellness Visit  Subsequent Medicare Wellness Visit    Chief Complaint   Patient presents with   • Medicare Wellness-subsequent      Subjective    History of Present Illness:  Penny Brown is a 71 y.o. female who presents for a Subsequent Medicare Wellness Visit.    The following portions of the patient's history were reviewed and   updated as appropriate: allergies, current medications, past family history, past medical history, past social history, past surgical history and problem list.    Compared to one year ago, the patient feels her physical   health is the same.    Compared to one year ago, the patient feels her mental   health is worse.    Recent Hospitalizations:  She was not admitted to the hospital during the last year.       Current Medical Providers:  Patient Care Team:  Julian Garg MD as PCP - General (Family Medicine)    Outpatient Medications Prior to Visit   Medication Sig Dispense Refill   • Calcium-Vitamin D-Vitamin K (Viactiv Calcium Plus D) 650-12.5-40 MG-MCG-MCG chewable tablet Chew 1 each 2 (two) times a day.     • Biotin 2500 MCG capsule Take 2 tablets by mouth Daily.     • calcium carbonate (OS-SHONNA) 600 MG tablet Take 600 mg by mouth Daily.     • clobetasol (TEMOVATE) 0.05 % cream Apply  topically to the appropriate area as directed Daily.     • levothyroxine (SYNTHROID, LEVOTHROID) 25 MCG tablet TAKE 1 TABLET BY MOUTH  DAILY 90 tablet 1   • multivitamin with minerals (Womens 50+ Multi Vitamin/Min) tablet tablet Take 1 tablet by mouth Daily.     • omeprazole (priLOSEC) 40 MG capsule Take 1 capsule by mouth Daily. 30 capsule 0   • rosuvastatin (CRESTOR) 20 MG tablet TAKE 1 TABLET BY MOUTH  DAILY 90 tablet 1   • albuterol sulfate  (90 Base) MCG/ACT inhaler Inhale 2 puffs Every 4 (Four) Hours As Needed for Wheezing. 18 g 1   • azithromycin (Zithromax) 250 MG tablet Take 2 tablets the first day, then 1 tablet daily for 4 days. 6 tablet 0   • Cyanocobalamin  "(VITAMIN B-12) 5000 MCG tablet dispersible Take 2,500 mcg by mouth Daily.     • dicyclomine (Bentyl) 10 MG capsule Take 1 capsule by mouth 4 (Four) Times a Day Before Meals & at Bedtime. 60 capsule 0   • estradiol (ESTRACE) 0.1 MG/GM vaginal cream Insert 1 applicator into the vagina As Needed.     • nitrofurantoin, macrocrystal-monohydrate, (MACROBID) 100 MG capsule Take 1 capsule by mouth Every 12 (Twelve) Hours. 14 capsule 0   • phenazopyridine (PYRIDIUM) 200 MG tablet Take 1 tablet by mouth 3 (Three) Times a Day As Needed for Bladder Spasms. 6 tablet 0   • umeclidinium-vilanterol (ANORO ELLIPTA) 62.5-25 MCG/INH aerosol powder  inhaler Inhale 1 puff Daily. 1 each 0     No facility-administered medications prior to visit.       No opioid medication identified on active medication list. I have reviewed chart for other potential  high risk medication/s and harmful drug interactions in the elderly.          Aspirin is not on active medication list.  Aspirin use is not indicated based on review of current medical condition/s. Risk of harm outweighs potential benefits.  .    Patient Active Problem List   Diagnosis   • Arcus senilis of both corneas   • Cystocele   • Epiphora due to insufficient drainage of right side   • Incomplete emptying of bladder   • OAB (overactive bladder)   • Senile cataracts of both eyes   • Uterovaginal prolapse, incomplete   • Hypothyroidism   • Hyperlipidemia   • Prediabetes   • Leg cramps   • Vertigo   • Hypotension due to hypovolemia     Advance Care Planning  Advance Directive is not on file.  ACP discussion was held with the patient during this visit. Patient has an advance directive (not in EMR), copy requested.          Objective    Vitals:    01/04/22 1313   BP: 102/82   Pulse: 73   Temp: 97.3 °F (36.3 °C)   SpO2: 99%   Weight: 64 kg (141 lb)   Height: 160 cm (62.99\")   PainSc: 0-No pain     BMI Readings from Last 1 Encounters:   01/04/22 24.98 kg/m²   BMI is within normal parameters. " No follow-up required.    Does the patient have evidence of cognitive impairment? No    Physical Exam            HEALTH RISK ASSESSMENT    Smoking Status:  Social History     Tobacco Use   Smoking Status Never Smoker   Smokeless Tobacco Never Used     Alcohol Consumption:  Social History     Substance and Sexual Activity   Alcohol Use Yes   • Alcohol/week: 1.0 - 2.0 standard drink   • Types: 1 - 2 Shots of liquor per week    Comment: 2-4/MONTH     Fall Risk Screen:    YOSEPH Fall Risk Assessment was completed, and patient is at LOW risk for falls.Assessment completed on:1/4/2022    Depression Screening:  PHQ-2/PHQ-9 Depression Screening 1/4/2022   Little interest or pleasure in doing things 0   Feeling down, depressed, or hopeless 0   Trouble falling or staying asleep, or sleeping too much 2   Feeling tired or having little energy 1   Poor appetite or overeating 0   Feeling bad about yourself - or that you are a failure or have let yourself or your family down 0   Trouble concentrating on things, such as reading the newspaper or watching television 0   Moving or speaking so slowly that other people could have noticed. Or the opposite - being so fidgety or restless that you have been moving around a lot more than usual 0   Thoughts that you would be better off dead, or of hurting yourself in some way 0   Total Score 3   If you checked off any problems, how difficult have these problems made it for you to do your work, take care of things at home, or get along with other people? Not difficult at all       Health Habits and Functional and Cognitive Screening:  Functional & Cognitive Status 1/4/2022   Do you have difficulty preparing food and eating? No   Do you have difficulty bathing yourself, getting dressed or grooming yourself? No   Do you have difficulty using the toilet? No   Do you have difficulty moving around from place to place? No   Do you have trouble with steps or getting out of a bed or a chair? No    Current Diet Other   Dental Exam Up to date   Eye Exam Up to date   Exercise (times per week) 2 times per week   Current Exercises Include Walking   Current Exercise Activities Include -   Do you need help using the phone?  No   Are you deaf or do you have serious difficulty hearing?  No   Do you need help with transportation? No   Do you need help shopping? No   Do you need help preparing meals?  No   Do you need help with housework?  No   Do you need help with laundry? No   Do you need help taking your medications? No   Do you need help managing money? No   Do you ever drive or ride in a car without wearing a seat belt? No   Have you felt unusual stress, anger or loneliness in the last month? No   Who do you live with? Other   If you need help, do you have trouble finding someone available to you? No   Have you been bothered in the last four weeks by sexual problems? No   Do you have difficulty concentrating, remembering or making decisions? No       Age-appropriate Screening Schedule:  Refer to the list below for future screening recommendations based on patient's age, sex and/or medical conditions. Orders for these recommended tests are listed in the plan section. The patient has been provided with a written plan.    Health Maintenance   Topic Date Due   • PAP SMEAR  01/18/2019   • DXA SCAN  01/18/2020   • LIPID PANEL  06/28/2022   • MAMMOGRAM  12/14/2022   • TDAP/TD VACCINES (2 - Td or Tdap) 08/27/2029   • ZOSTER VACCINE  Completed   • INFLUENZA VACCINE  Discontinued              Assessment/Plan   CMS Preventative Services Quick Reference  Risk Factors Identified During Encounter  None Identified  The above risks/problems have been discussed with the patient.  Follow up actions/plans if indicated are seen below in the Assessment/Plan Section.  Pertinent information has been shared with the patient in the After Visit Summary.    Diagnoses and all orders for this visit:    1. Well woman exam (no gynecological  exam) (Primary)    2. Medicare annual wellness visit, subsequent    3. Healthcare maintenance  -     CBC & Differential  -     Comprehensive Metabolic Panel  -     Hemoglobin A1c  -     Thyroid Panel With TSH  -     Lipid Panel With LDL / HDL Ratio    4. Screening for osteoporosis  -     DEXA Bone Density Axial    5. Encounter for follow-up examination after completed treatment for conditions other than malignant neoplasm   -     DEXA Bone Density Axial        Follow Up:   No follow-ups on file.     An After Visit Summary and PPPS were made available to the patient.

## 2022-01-18 ENCOUNTER — TRANSCRIBE ORDERS (OUTPATIENT)
Dept: ADMINISTRATIVE | Facility: HOSPITAL | Age: 72
End: 2022-01-18

## 2022-01-18 DIAGNOSIS — Z13.6 ENCOUNTER FOR SCREENING FOR VASCULAR DISEASE: Primary | ICD-10-CM

## 2022-02-08 ENCOUNTER — TELEPHONE (OUTPATIENT)
Dept: INTERNAL MEDICINE | Facility: CLINIC | Age: 72
End: 2022-02-08

## 2022-02-08 NOTE — TELEPHONE ENCOUNTER
Caller: Penny Brown    Relationship to patient: Self    Best call back number: [phone]         Date of positive COVID19 test: 2/8/2022      COVID19 symptoms:   SORE THROAT, DRY COUGH,  HAD A HEADACHE TOOK SOME OTC MEDICATION        Additional information or concerns:     PATIENT TOOK A HOME COVID TEST THIS MORNING AND IT WAS POSITIVE.  PATIENT JUST WANTS TO KNOW WHAT SHOULD SHE DO.  NOT HAVING ANY ISSUES WITH BREATHING.    PLEASE CALL PATIENT AND ADVISE.            What is the patients preferred pharmacy:       [id number] - [place], [place] - [address] [address] AT [facility] [address] - [phone]  - [phone] FX

## 2022-02-09 NOTE — TELEPHONE ENCOUNTER
She needs to monitor her symptoms.  She needs to quarantine.  Her oxygen levels start dropping, specifically below 93 she is to go to the hospital.  If she does not feel well she is to go to the hospital.  I am okay with her taking over-the-counter medicines at this point.  And take plenty of fluids.

## 2022-03-14 ENCOUNTER — TELEPHONE (OUTPATIENT)
Dept: INTERNAL MEDICINE | Facility: CLINIC | Age: 72
End: 2022-03-14

## 2022-03-14 NOTE — TELEPHONE ENCOUNTER
PATIENT STATES: THAT SHE WOULD LIKE A CALL BACK TO SEE IF SHE CAN GET SOMETHING CALLED IN FOR SEA SICK FOR HER TRIP. SHE IS LEAVING 3/16/2022 IN THE MORNING. PLEASE ADVISE      PATIENT CAN BE REACHED ON: [phone]    PHARMACY [facility] #[id number] - [place], [place] - [address] AT [facility] [address]  - [phone]  - [phone]   [phone]

## 2022-03-15 RX ORDER — SCOLOPAMINE TRANSDERMAL SYSTEM 1 MG/1
1 PATCH, EXTENDED RELEASE TRANSDERMAL
Qty: 4 EACH | Refills: 0 | Status: SHIPPED | OUTPATIENT
Start: 2022-03-15 | End: 2022-05-09

## 2022-04-13 ENCOUNTER — TRANSCRIBE ORDERS (OUTPATIENT)
Dept: ADMINISTRATIVE | Facility: HOSPITAL | Age: 72
End: 2022-04-13

## 2022-04-13 DIAGNOSIS — Z13.6 ENCOUNTER FOR SCREENING FOR VASCULAR DISEASE: Primary | ICD-10-CM

## 2022-04-25 ENCOUNTER — HOSPITAL ENCOUNTER (OUTPATIENT)
Dept: BONE DENSITY | Facility: HOSPITAL | Age: 72
Discharge: HOME | End: 2022-04-25
Admitting: FAMILY MEDICINE

## 2022-04-25 PROCEDURE — 77080 DXA BONE DENSITY AXIAL: CPT

## 2022-05-05 DIAGNOSIS — E07.9 THYROID DISEASE: ICD-10-CM

## 2022-05-05 RX ORDER — LEVOTHYROXINE SODIUM 0.03 MG/1
25 TABLET ORAL DAILY
Qty: 90 TABLET | Refills: 3 | Status: SHIPPED | OUTPATIENT
Start: 2022-05-05

## 2022-05-09 ENCOUNTER — OFFICE VISIT (OUTPATIENT)
Dept: INTERNAL MEDICINE | Facility: CLINIC | Age: 72
End: 2022-05-09

## 2022-05-09 ENCOUNTER — OFFICE VISIT (OUTPATIENT)
Dept: CARDIOLOGY | Facility: CLINIC | Age: 72
End: 2022-05-09

## 2022-05-09 ENCOUNTER — LAB (OUTPATIENT)
Dept: LAB | Facility: HOSPITAL | Age: 72
End: 2022-05-09

## 2022-05-09 VITALS
HEIGHT: 62 IN | SYSTOLIC BLOOD PRESSURE: 110 MMHG | DIASTOLIC BLOOD PRESSURE: 74 MMHG | RESPIRATION RATE: 16 BRPM | WEIGHT: 138 LBS | OXYGEN SATURATION: 98 % | HEART RATE: 68 BPM | BODY MASS INDEX: 25.4 KG/M2

## 2022-05-09 VITALS
WEIGHT: 138 LBS | OXYGEN SATURATION: 99 % | HEART RATE: 73 BPM | HEIGHT: 63 IN | TEMPERATURE: 98 F | DIASTOLIC BLOOD PRESSURE: 81 MMHG | BODY MASS INDEX: 24.45 KG/M2 | RESPIRATION RATE: 16 BRPM | SYSTOLIC BLOOD PRESSURE: 108 MMHG

## 2022-05-09 DIAGNOSIS — G47.33 OBSTRUCTIVE SLEEP APNEA (ADULT) (PEDIATRIC): ICD-10-CM

## 2022-05-09 DIAGNOSIS — R00.2 PALPITATIONS: Primary | ICD-10-CM

## 2022-05-09 DIAGNOSIS — G62.9 NEUROPATHY: ICD-10-CM

## 2022-05-09 DIAGNOSIS — R00.2 HEART PALPITATIONS: Primary | ICD-10-CM

## 2022-05-09 DIAGNOSIS — K21.9 GASTROESOPHAGEAL REFLUX DISEASE WITHOUT ESOPHAGITIS: ICD-10-CM

## 2022-05-09 DIAGNOSIS — R51.9 CHRONIC NONINTRACTABLE HEADACHE, UNSPECIFIED HEADACHE TYPE: ICD-10-CM

## 2022-05-09 DIAGNOSIS — E78.2 MIXED HYPERLIPIDEMIA: ICD-10-CM

## 2022-05-09 DIAGNOSIS — G89.29 CHRONIC NONINTRACTABLE HEADACHE, UNSPECIFIED HEADACHE TYPE: ICD-10-CM

## 2022-05-09 LAB
ALBUMIN SERPL-MCNC: 4.3 G/DL (ref 3.5–5.2)
ALBUMIN/GLOB SERPL: 1.7 G/DL
ALP SERPL-CCNC: 61 U/L (ref 39–117)
ALT SERPL W P-5'-P-CCNC: 20 U/L (ref 1–33)
ANION GAP SERPL CALCULATED.3IONS-SCNC: 10 MMOL/L (ref 5–15)
AST SERPL-CCNC: 32 U/L (ref 1–32)
BASOPHILS # BLD AUTO: 0.02 10*3/MM3 (ref 0–0.2)
BASOPHILS NFR BLD AUTO: 0.5 % (ref 0–1.5)
BILIRUB SERPL-MCNC: 0.7 MG/DL (ref 0–1.2)
BUN SERPL-MCNC: 11 MG/DL (ref 8–23)
BUN/CREAT SERPL: 13.8 (ref 7–25)
CALCIUM SPEC-SCNC: 9.1 MG/DL (ref 8.6–10.5)
CHLORIDE SERPL-SCNC: 106 MMOL/L (ref 98–107)
CO2 SERPL-SCNC: 27 MMOL/L (ref 22–29)
CREAT SERPL-MCNC: 0.8 MG/DL (ref 0.57–1)
DEPRECATED RDW RBC AUTO: 43.8 FL (ref 37–54)
EGFRCR SERPLBLD CKD-EPI 2021: 77.9 ML/MIN/1.73
EOSINOPHIL # BLD AUTO: 0.07 10*3/MM3 (ref 0–0.4)
EOSINOPHIL NFR BLD AUTO: 1.6 % (ref 0.3–6.2)
ERYTHROCYTE [DISTWIDTH] IN BLOOD BY AUTOMATED COUNT: 12.5 % (ref 12.3–15.4)
GLOBULIN UR ELPH-MCNC: 2.5 GM/DL
GLUCOSE SERPL-MCNC: 87 MG/DL (ref 65–99)
HCT VFR BLD AUTO: 42.9 % (ref 34–46.6)
HGB BLD-MCNC: 14.1 G/DL (ref 12–15.9)
IMM GRANULOCYTES # BLD AUTO: 0.01 10*3/MM3 (ref 0–0.05)
IMM GRANULOCYTES NFR BLD AUTO: 0.2 % (ref 0–0.5)
LYMPHOCYTES # BLD AUTO: 1.34 10*3/MM3 (ref 0.7–3.1)
LYMPHOCYTES NFR BLD AUTO: 30.5 % (ref 19.6–45.3)
MAGNESIUM SERPL-MCNC: 1.9 MG/DL (ref 1.6–2.4)
MCH RBC QN AUTO: 31.5 PG (ref 26.6–33)
MCHC RBC AUTO-ENTMCNC: 32.9 G/DL (ref 31.5–35.7)
MCV RBC AUTO: 95.8 FL (ref 79–97)
MONOCYTES # BLD AUTO: 0.46 10*3/MM3 (ref 0.1–0.9)
MONOCYTES NFR BLD AUTO: 10.5 % (ref 5–12)
NEUTROPHILS NFR BLD AUTO: 2.49 10*3/MM3 (ref 1.7–7)
NEUTROPHILS NFR BLD AUTO: 56.7 % (ref 42.7–76)
NRBC BLD AUTO-RTO: 0 /100 WBC (ref 0–0.2)
PLATELET # BLD AUTO: 182 10*3/MM3 (ref 140–450)
PMV BLD AUTO: 10.2 FL (ref 6–12)
POTASSIUM SERPL-SCNC: 4.3 MMOL/L (ref 3.5–5.2)
PROT SERPL-MCNC: 6.8 G/DL (ref 6–8.5)
RBC # BLD AUTO: 4.48 10*6/MM3 (ref 3.77–5.28)
SODIUM SERPL-SCNC: 143 MMOL/L (ref 136–145)
T-UPTAKE NFR SERPL: 1.11 TBI (ref 0.8–1.3)
T4 SERPL-MCNC: 9.42 MCG/DL (ref 4.5–11.7)
TSH SERPL DL<=0.05 MIU/L-ACNC: 2.94 UIU/ML (ref 0.27–4.2)
VIT B12 BLD-MCNC: 396 PG/ML (ref 211–946)
WBC NRBC COR # BLD: 4.39 10*3/MM3 (ref 3.4–10.8)

## 2022-05-09 PROCEDURE — 80053 COMPREHEN METABOLIC PANEL: CPT | Performed by: FAMILY MEDICINE

## 2022-05-09 PROCEDURE — 93000 ELECTROCARDIOGRAM COMPLETE: CPT | Performed by: FAMILY MEDICINE

## 2022-05-09 PROCEDURE — 36415 COLL VENOUS BLD VENIPUNCTURE: CPT | Performed by: FAMILY MEDICINE

## 2022-05-09 PROCEDURE — 83921 ORGANIC ACID SINGLE QUANT: CPT | Performed by: FAMILY MEDICINE

## 2022-05-09 PROCEDURE — 83735 ASSAY OF MAGNESIUM: CPT | Performed by: FAMILY MEDICINE

## 2022-05-09 PROCEDURE — 99204 OFFICE O/P NEW MOD 45 MIN: CPT | Performed by: INTERNAL MEDICINE

## 2022-05-09 PROCEDURE — 84436 ASSAY OF TOTAL THYROXINE: CPT | Performed by: FAMILY MEDICINE

## 2022-05-09 PROCEDURE — 84479 ASSAY OF THYROID (T3 OR T4): CPT | Performed by: FAMILY MEDICINE

## 2022-05-09 PROCEDURE — 82607 VITAMIN B-12: CPT | Performed by: FAMILY MEDICINE

## 2022-05-09 PROCEDURE — 84443 ASSAY THYROID STIM HORMONE: CPT | Performed by: FAMILY MEDICINE

## 2022-05-09 PROCEDURE — 99214 OFFICE O/P EST MOD 30 MIN: CPT | Performed by: FAMILY MEDICINE

## 2022-05-09 PROCEDURE — 93000 ELECTROCARDIOGRAM COMPLETE: CPT | Performed by: INTERNAL MEDICINE

## 2022-05-09 PROCEDURE — 85025 COMPLETE CBC W/AUTO DIFF WBC: CPT | Performed by: FAMILY MEDICINE

## 2022-05-09 RX ORDER — ALBUTEROL SULFATE 90 UG/1
AEROSOL, METERED RESPIRATORY (INHALATION)
COMMUNITY
Start: 2021-12-29 | End: 2022-05-09

## 2022-05-09 RX ORDER — ESTRADIOL 0.1 MG/G
CREAM VAGINAL
COMMUNITY
Start: 2022-01-05

## 2022-05-09 RX ORDER — OMEPRAZOLE 40 MG/1
40 CAPSULE, DELAYED RELEASE ORAL DAILY
Qty: 30 CAPSULE | Refills: 5 | Status: SHIPPED | OUTPATIENT
Start: 2022-05-09

## 2022-05-09 NOTE — PROGRESS NOTES
"      CARDIOLOGY    Katherine Alexander MD    ENCOUNTER DATE:  05/09/2022    Penny Brown / 71 y.o. / female        CHIEF COMPLAINT / REASON FOR OFFICE VISIT     Abnormal ECG      HISTORY OF PRESENT ILLNESS       HPI    Penny Brown is a 71 y.o. female     This is a nice lady with elevated cholesterol, on rosuvastatin, but otherwise pretty healthy. She notices occasional palpitations. They may occur every few months. She had an episode last night and she checked her pulse on a pulse ox and it was around 100 beats/minute. She does not have any dizziness or presyncope with the palpitations. They go away on their own. She was seeing Dr. Garg this morning and mentioned it to him, and he checked an EKG which the machine misread. We repeated the EKG here and it looks the same, it is sinus rhythm. He has also ordered a 40 hour Holter monitor.         REVIEW OF SYSTEMS     Review of Systems   Constitutional: Negative for chills, fever, weight gain and weight loss.   Cardiovascular: Negative for leg swelling.   Respiratory: Negative for cough, snoring and wheezing.    Hematologic/Lymphatic: Negative for bleeding problem. Does not bruise/bleed easily.   Skin: Negative for color change.   Musculoskeletal: Negative for falls, joint pain and myalgias.   Gastrointestinal: Negative for melena.   Genitourinary: Negative for hematuria.   Neurological: Negative for excessive daytime sleepiness.   Psychiatric/Behavioral: Negative for depression. The patient is not nervous/anxious.          VITAL SIGNS     Visit Vitals  /74 (BP Location: Left arm, Patient Position: Sitting, Cuff Size: Adult)   Pulse 68   Resp 16   Ht 157.5 cm (62\")   Wt 62.6 kg (138 lb)   SpO2 98%   BMI 25.24 kg/m²         Wt Readings from Last 3 Encounters:   05/09/22 62.6 kg (138 lb)   05/09/22 62.6 kg (138 lb)   01/04/22 64 kg (141 lb)     Body mass index is 25.24 kg/m².      PHYSICAL EXAMINATION     Constitutional:       General: Not in acute " distress.  Neck:      Vascular: No carotid bruit or JVD.   Pulmonary:      Effort: Pulmonary effort is normal.      Breath sounds: Normal breath sounds.   Cardiovascular:      Normal rate. Regular rhythm.      Murmurs: There is no murmur.   Psychiatric:         Mood and Affect: Mood and affect normal.           REVIEWED DATA       ECG 12 Lead    Date/Time: 5/9/2022 1:59 PM  Performed by: Katherine Alexander MD  Authorized by: Katherine Alexander MD   Comparison: compared with previous ECG from 5/9/2022  Similar to previous ECG  Rhythm: sinus rhythm  Conduction: conduction normal  ST Segments: ST segments normal  T Waves: T waves normal    Clinical impression: normal ECG              Lipid Panel    Lipid Panel 6/28/21 1/4/22   Total Cholesterol 153 165   Triglycerides 102 175 (A)   HDL Cholesterol 59 62 (A)   VLDL Cholesterol 19 29   LDL Cholesterol  75 74   LDL/HDL Ratio 1.25 1.10   (A) Abnormal value              Lab Results   Component Value Date    GLUCOSE 81 01/04/2022    BUN 11 01/04/2022    CREATININE 0.68 01/04/2022    EGFRIFNONA 85 01/04/2022    EGFRIFAFRI 74 01/16/2020    BCR 16.2 01/04/2022    K 4.2 01/04/2022    CO2 29.2 (H) 01/04/2022    CALCIUM 9.9 01/04/2022    PROTENTOTREF 7.0 01/16/2020    ALBUMIN 4.30 01/04/2022    LABIL2 1.6 01/16/2020    AST 32 01/04/2022    ALT 22 01/04/2022       ASSESSMENT & PLAN      Diagnosis Plan   1. Palpitations  Home Sleep Study   2. Mixed hyperlipidemia  Home Sleep Study   3. Chronic nonintractable headache, unspecified headache type  Home Sleep Study   4. Obstructive sleep apnea (adult) (pediatric)   Home Sleep Study         1.  Palpitations with some mild tachycardia documented on her pulse ox while she was symptomatic. I agree with a 48 hour Holter monitor and we will get that placed today. Unfortunately with her palpitations being so spaced out, there is a good chance we may not catch any arrhythmia. I told her if they ever become more chronologically close together we  could consider 14 day or 30 day monitor.   2.  Morning headaches. It sounds like she might have some sleep apnea. I am going to set her up with a home sleep study.   3.  Hyperlipidemia, on rosuvastatin. I reviewed her cholesterol numbers as above and recommend she continue with it.     As we start to get results back, I will be in touch with her if anything comes back concerning.           Orders Placed This Encounter   Procedures   • ECG 12 Lead     This order was created via procedure documentation     Order Specific Question:   Release to patient     Answer:   Immediate   • Home Sleep Study     Standing Status:   Future     Standing Expiration Date:   5/9/2023     Order Specific Question:   May take own meds     Answer:   Yes     Order Specific Question:   If any contraindications for HST are checked, patient may be recommended for in-lab testing. HST is indicated for patients in whom SHAHAB is  suspected diagnosis.     Answer:   Does not apply           MEDICATIONS         Discharge Medications          Accurate as of May 9, 2022  1:59 PM. If you have any questions, ask your nurse or doctor.            Continue These Medications      Instructions Start Date   clobetasol 0.05 % cream  Commonly known as: TEMOVATE   Topical, Daily      estradiol 0.1 MG/GM vaginal cream  Commonly known as: ESTRACE   Use pea size amount daily externally.      levothyroxine 25 MCG tablet  Commonly known as: SYNTHROID, LEVOTHROID   25 mcg, Oral, Daily      multivitamin with minerals tablet tablet   1 tablet, Oral, Daily      omeprazole 40 MG capsule  Commonly known as: priLOSEC   40 mg, Oral, Daily      rosuvastatin 20 MG tablet  Commonly known as: CRESTOR   20 mg, Oral, Daily      Viactiv Calcium Plus D 650-12.5-40 MG-MCG-MCG chewable tablet  Generic drug: Calcium-Vitamin D-Vitamin K   1 each, Oral, 2 times daily         Stop These Medications    albuterol sulfate  (90 Base) MCG/ACT inhaler  Commonly known as: PROVENTIL  HFA;VENTOLIN HFA;PROAIR HFA  Stopped by: Julian Garg MD     Scopolamine 1 MG/3DAYS patch  Commonly known as: Transderm-Scop (1.5 MG)  Stopped by: MD Katherine Palomares MD  05/09/22  13:59 EDT    **Dragon Disclaimer:   Much of this encounter note is an electronic transcription/translation of spoken language to printed text. The electronic translation of spoken language may permit erroneous, or at times, nonsensical words or phrases to be inadvertently transcribed. Although I have reviewed the note for such errors, some may still exist.

## 2022-05-09 NOTE — PROGRESS NOTES
"Chief Complaint  Annual Exam (Having heart palpations/ Acid reflux)    Subjective          Penny Brown presents to [facility]  History of Present Illness    Heart Palpitations  The patient is here with complaints of heart palpitations and acid reflux. Patient mentions the palpitations are very inconsistent the last episode was while sleeping last night she got up to use the bathroom and she got a jittery feeling. She denies a panic attack. She mentions her brother is a respiratory therapist and his wife is a nurse. Patient reports taking her pulse with her oximeter and her brother told her she is having irregular pulse. She reports she has a cardiovascular screening on 5/20/2022.    Patient mentions she is still taking omeprazole daily. Patient mentions her acid reflux is bothering her because of poor diet. Patient mentions that the medication does control her reflux. She mentions omeprazole works well for her. Denies side effects.    She mentions she had a bone density test done recently and has osteopenia.    The patient mentions that she has peripheral neuropathy in her bilateral hands and feet would like to know if there is a supplement she can take. she mentions it bothers her mostly at night while trying to sleep. She mentions she sleeps with a pillow under her legs because if her heels touch the bed she feels a burning sensation.    Objective   Vital Signs:  /81   Pulse 73   Temp 98 °F (36.7 °C)   Resp 16   Ht 160 cm (62.99\")   Wt 62.6 kg (138 lb)   SpO2 99%   BMI 24.45 kg/m²     BMI is within normal parameters. No follow-up required.      Physical Exam  Vitals and nursing note reviewed.   Constitutional:       Appearance: She is well-developed.   HENT:      Head: Normocephalic and atraumatic.   Musculoskeletal:      Cervical back: Normal range of motion and neck supple.   Neurological:      Mental Status: She is alert and oriented to person, place, and time. "   Psychiatric:         Behavior: Behavior normal.            ECG 12 Lead    Date/Time: 5/9/2022 2:13 PM  Performed by: Julian Garg MD  Authorized by: Julian Garg MD   Rhythm: supraventricular tachycardia  Rate: normal  Conduction: conduction normal  ST Segments: ST segments normal  QRS axis: normal  Other: no other findings    Clinical impression: abnormal EKG              Assessment and Plan    Diagnoses and all orders for this visit:    1. Heart palpitations (Primary)        -     EKG done in office positive for supraventricular tachycardia arrhythmia. Ventricular rate was 66 beats per minute in office. Referral given to cardiology. Blood work ordered. thyroid, magnesium, electrolytes, and complete blood count. Referral to chest pain clinic for arrhythmia.  Holter monitor test ordered.  -     Comprehensive Metabolic Panel  -     CBC & Differential  -     Thyroid Panel With TSH  -     Magnesium  -     Holter monitor - 48 hour; Future  -     Ambulatory Referral to Cardiology  -     Vitamin B12  -     Methylmalonic Acid, Serum  -     ECG 12 Lead    2. Gastroesophageal reflux disease without esophagitis        -     Continue taking omeprazole daily. Refill sent to the pharmacy on file.  -     omeprazole (priLOSEC) 40 MG capsule; Take 1 capsule by mouth Daily.  Dispense: 30 capsule; Refill: 5  -     Vitamin B12  -     Methylmalonic Acid, Serum    3. Neuropathy        -     Advised patient to try capsaicin cream over the counter. Counseled patient on medication and directions.     Follow Up   No follow-ups on file.  Patient was given instructions and counseling regarding her condition or for health maintenance advice. Please see specific information pulled into the AVS if appropriate.     Transcribed from ambient dictation for Julian Garg MD by Luz Méndez.  05/09/22   11:37 EDT    Patient verbalized consent to the visit recording.  I have personally performed the services described in this  document as transcribed by the above individual, and it is both accurate and complete. Luz Méndez  5/9/2022  11:39 EDT

## 2022-05-10 DIAGNOSIS — E78.5 HYPERLIPIDEMIA, UNSPECIFIED HYPERLIPIDEMIA TYPE: ICD-10-CM

## 2022-05-11 RX ORDER — ROSUVASTATIN CALCIUM 20 MG/1
20 TABLET, COATED ORAL DAILY
Qty: 90 TABLET | Refills: 3 | Status: SHIPPED | OUTPATIENT
Start: 2022-05-11

## 2022-05-15 LAB — METHYLMALONATE SERPL-SCNC: 166 NMOL/L (ref 0–378)

## 2022-05-20 ENCOUNTER — HOSPITAL ENCOUNTER (OUTPATIENT)
Dept: CARDIOLOGY | Facility: HOSPITAL | Age: 72
Discharge: HOME | End: 2022-05-20
Admitting: SURGERY

## 2022-05-20 VITALS
DIASTOLIC BLOOD PRESSURE: 70 MMHG | HEART RATE: 63 BPM | SYSTOLIC BLOOD PRESSURE: 116 MMHG | WEIGHT: 138 LBS | BODY MASS INDEX: 24.45 KG/M2 | HEIGHT: 63 IN

## 2022-05-20 DIAGNOSIS — Z13.6 ENCOUNTER FOR SCREENING FOR VASCULAR DISEASE: ICD-10-CM

## 2022-05-20 LAB
BH CV ECHO MEAS - DIST AO DIAM: 1.46 CM
BH CV VAS BP LEFT ARM: NORMAL MMHG
BH CV VAS BP RIGHT ARM: NORMAL MMHG
BH CV XLRA MEAS - MID AO DIAM: 1.67 CM
BH CV XLRA MEAS - PAD LEFT ABI DP: 1.3
BH CV XLRA MEAS - PAD LEFT ABI PT: 1.38
BH CV XLRA MEAS - PAD LEFT ARM: 116 MMHG
BH CV XLRA MEAS - PAD LEFT LEG DP: 151 MMHG
BH CV XLRA MEAS - PAD LEFT LEG PT: 160 MMHG
BH CV XLRA MEAS - PAD RIGHT ABI DP: 1.29
BH CV XLRA MEAS - PAD RIGHT ABI PT: 1.36
BH CV XLRA MEAS - PAD RIGHT ARM: 114 MMHG
BH CV XLRA MEAS - PAD RIGHT LEG DP: 150 MMHG
BH CV XLRA MEAS - PAD RIGHT LEG PT: 158 MMHG
BH CV XLRA MEAS - PROX AO DIAM: 2.01 CM
BH CV XLRA MEAS LEFT ICA/CCA RATIO: 1.35
BH CV XLRA MEAS LEFT MID CCA PSV: NORMAL CM/SEC
BH CV XLRA MEAS LEFT MID ICA PSV: NORMAL CM/SEC
BH CV XLRA MEAS LEFT PROX ECA PSV: NORMAL CM/SEC
BH CV XLRA MEAS RIGHT ICA/CCA RATIO: 1.23
BH CV XLRA MEAS RIGHT MID CCA PSV: NORMAL CM/SEC
BH CV XLRA MEAS RIGHT MID ICA PSV: NORMAL CM/SEC
BH CV XLRA MEAS RIGHT PROX ECA PSV: NORMAL CM/SEC
MAXIMAL PREDICTED HEART RATE: 147 BPM
STRESS TARGET HR: 125 BPM

## 2022-05-20 PROCEDURE — 93799 UNLISTED CV SVC/PROCEDURE: CPT

## 2022-05-25 ENCOUNTER — OFFICE VISIT (OUTPATIENT)
Dept: CARDIOLOGY | Facility: CLINIC | Age: 72
End: 2022-05-25

## 2022-05-25 VITALS
SYSTOLIC BLOOD PRESSURE: 122 MMHG | RESPIRATION RATE: 16 BRPM | HEART RATE: 65 BPM | HEIGHT: 63 IN | BODY MASS INDEX: 24.45 KG/M2 | DIASTOLIC BLOOD PRESSURE: 84 MMHG | WEIGHT: 138 LBS | OXYGEN SATURATION: 98 %

## 2022-05-25 DIAGNOSIS — R00.2 PALPITATIONS: ICD-10-CM

## 2022-05-25 DIAGNOSIS — E78.2 MIXED HYPERLIPIDEMIA: Primary | ICD-10-CM

## 2022-05-25 PROCEDURE — 93000 ELECTROCARDIOGRAM COMPLETE: CPT | Performed by: INTERNAL MEDICINE

## 2022-05-25 PROCEDURE — 99213 OFFICE O/P EST LOW 20 MIN: CPT | Performed by: INTERNAL MEDICINE

## 2022-05-25 NOTE — PROGRESS NOTES
"      CARDIOLOGY    Katherine Alexander MD    ENCOUNTER DATE:  05/25/2022    Penny Brown / 71 y.o. / female        CHIEF COMPLAINT / REASON FOR OFFICE VISIT     Heart Problem (Follow up Palps )      HISTORY OF PRESENT ILLNESS       HPI    Penny Brown is a 71 y.o. female     This is a nice lady with elevated cholesterol, on rosuvastatin, but otherwise pretty healthy. She notices occasional palpitations. They may occur every few months.  I saw her on May 9, 2022 because of this.  She had also noticed some borderline tachycardia with her heart rate being about 100 bpm on pulse ox.  I had her wear 48-hour monitor which did not show any significant arrhythmia.  She did have PVCs and PACs that were not frequent.    REVIEW OF SYSTEMS     Review of Systems   Constitutional: Negative for chills, fever, weight gain and weight loss.   Cardiovascular: Negative for leg swelling.   Respiratory: Negative for cough, snoring and wheezing.    Hematologic/Lymphatic: Negative for bleeding problem. Does not bruise/bleed easily.   Skin: Negative for color change.   Musculoskeletal: Negative for falls, joint pain and myalgias.   Gastrointestinal: Negative for melena.   Genitourinary: Negative for hematuria.   Neurological: Negative for excessive daytime sleepiness.   Psychiatric/Behavioral: Negative for depression. The patient is not nervous/anxious.          VITAL SIGNS     Visit Vitals  /84 (BP Location: Right arm, Patient Position: Sitting, Cuff Size: Adult)   Pulse 65   Resp 16   Ht 158.8 cm (62.5\")   Wt 62.6 kg (138 lb)   SpO2 98%   BMI 24.84 kg/m²         Wt Readings from Last 3 Encounters:   05/25/22 62.6 kg (138 lb)   05/20/22 62.6 kg (138 lb)   05/09/22 62.6 kg (138 lb)     Body mass index is 24.84 kg/m².      PHYSICAL EXAMINATION     Constitutional:       General: Not in acute distress.  Neck:      Vascular: No carotid bruit or JVD.   Pulmonary:      Effort: Pulmonary effort is normal.      Breath sounds: Normal breath " sounds.   Cardiovascular:      Normal rate. Regular rhythm.      Murmurs: There is no murmur.   Psychiatric:         Mood and Affect: Mood and affect normal.           REVIEWED DATA       ECG 12 Lead    Date/Time: 5/25/2022 12:12 PM  Performed by: Katherine Alexander MD  Authorized by: Katherine Alexander MD   Comparison: compared with previous ECG from 5/9/2022  Similar to previous ECG  Rhythm: sinus rhythm  BPM: 62  Conduction: conduction normal  ST Segments: ST segments normal  T Waves: T waves normal    Clinical impression: normal ECG              Lipid Panel    Lipid Panel 6/28/21 1/4/22   Total Cholesterol 153 165   Triglycerides 102 175 (A)   HDL Cholesterol 59 62 (A)   VLDL Cholesterol 19 29   LDL Cholesterol  75 74   LDL/HDL Ratio 1.25 1.10   (A) Abnormal value              Lab Results   Component Value Date    GLUCOSE 87 05/09/2022    BUN 11 05/09/2022    CREATININE 0.80 05/09/2022    EGFRIFNONA 85 01/04/2022    EGFRIFAFRI 74 01/16/2020    BCR 13.8 05/09/2022    K 4.3 05/09/2022    CO2 27.0 05/09/2022    CALCIUM 9.1 05/09/2022    PROTENTOTREF 7.0 01/16/2020    ALBUMIN 4.30 05/09/2022    LABIL2 1.6 01/16/2020    AST 32 05/09/2022    ALT 20 05/09/2022       ASSESSMENT & PLAN      Diagnosis Plan   1. Mixed hyperlipidemia     2. Palpitations           1.  Palpitations with mild tachycardia documented on the pulse ox.  48-hour monitor was unremarkable.  I told her to call me if she is having more symptoms and we could maybe try a longer term monitor to see if we can capture any arrhythmias but at this time she is feeling well.  2.  Morning headaches.  Sleep study scheduled for next week.  3.  Hyperlipidemia.  On rosuvastatin.  Lipid numbers reviewed and I recommend she continue with it.    Follow-up in a year unless her symptoms change sooner.      Orders Placed This Encounter   Procedures   • ECG 12 Lead     This order was created via procedure documentation     Order Specific Question:   Release to patient      Answer:   Immediate           MEDICATIONS         Discharge Medications          Accurate as of May 25, 2022 12:14 PM. If you have any questions, ask your nurse or doctor.            Changes to Medications      Instructions Start Date   omeprazole 40 MG capsule  Commonly known as: priLOSEC  What changed: additional instructions   40 mg, Oral, Daily         Continue These Medications      Instructions Start Date   clobetasol 0.05 % cream  Commonly known as: TEMOVATE   Topical, Daily, As needed      estradiol 0.1 MG/GM vaginal cream  Commonly known as: ESTRACE   ONLY USES ABOUT TWICE A WEEK      levothyroxine 25 MCG tablet  Commonly known as: SYNTHROID, LEVOTHROID   25 mcg, Oral, Daily      multivitamin with minerals tablet tablet   1 tablet, Oral, Daily      rosuvastatin 20 MG tablet  Commonly known as: CRESTOR   20 mg, Oral, Daily      Viactiv Calcium Plus D 650-12.5-40 MG-MCG-MCG chewable tablet  Generic drug: Calcium-Vitamin D-Vitamin K   1 each, Oral, 2 times daily               Katherine Alexander MD  05/25/22  12:14 EDT    **Judi Disclaimer:   Much of this encounter note is an electronic transcription/translation of spoken language to printed text. The electronic translation of spoken language may permit erroneous, or at times, nonsensical words or phrases to be inadvertently transcribed. Although I have reviewed the note for such errors, some may still exist.

## 2022-06-21 ENCOUNTER — HOSPITAL ENCOUNTER (OUTPATIENT)
Dept: SLEEP MEDICINE | Facility: HOSPITAL | Age: 72
Discharge: HOME | End: 2022-06-21
Admitting: INTERNAL MEDICINE

## 2022-06-21 DIAGNOSIS — G89.29 CHRONIC NONINTRACTABLE HEADACHE, UNSPECIFIED HEADACHE TYPE: ICD-10-CM

## 2022-06-21 DIAGNOSIS — R00.2 PALPITATIONS: ICD-10-CM

## 2022-06-21 DIAGNOSIS — E78.2 MIXED HYPERLIPIDEMIA: ICD-10-CM

## 2022-06-21 DIAGNOSIS — G47.33 OBSTRUCTIVE SLEEP APNEA (ADULT) (PEDIATRIC): ICD-10-CM

## 2022-06-21 DIAGNOSIS — R51.9 CHRONIC NONINTRACTABLE HEADACHE, UNSPECIFIED HEADACHE TYPE: ICD-10-CM

## 2022-06-21 PROCEDURE — 95806 SLEEP STUDY UNATT&RESP EFFT: CPT | Performed by: FAMILY MEDICINE

## 2022-06-21 PROCEDURE — 95806 SLEEP STUDY UNATT&RESP EFFT: CPT

## 2022-07-13 ENCOUNTER — TELEPHONE (OUTPATIENT)
Dept: INTERNAL MEDICINE | Facility: CLINIC | Age: 72
End: 2022-07-13

## 2022-07-13 DIAGNOSIS — R10.30 LOWER ABDOMINAL PAIN: ICD-10-CM

## 2022-07-13 NOTE — TELEPHONE ENCOUNTER
Caller: Penny Brown    Relationship: Self    Best call back number: [phone]    What medication are you requesting: WHATEVER DR GONZALEZ WOULD RECOMMEND     What are your current symptoms: LOWER LEFT ABDOMEN DISCOMFORT STATES IT FEELS LIKE GAS. IS HAVING SOME BLOATING AND CONSTIPATION. HAS TRIED OTC GAS MEDICATIONS    How long have you been experiencing symptoms: 2 DAYS     Have you had these symptoms before:    [] Yes  [x] No    Have you been treated for these symptoms before:   [] Yes  [x] No    If a prescription is needed, what is your preferred pharmacy and phone number: [id number] - [place], [place] - [address] [facility] [address]  - [phone] [facility] [phone] FX     Additional notes: WAS TREATED FOR SOMETHING SIMILAR LAST YEAR. HAS DICYCLOMINE 10MG THAT  IN MAY, WOULD LIKE TO KNOW IF SHE SHOULD TRY THESE FOR HER SYMPTOMS.

## 2022-07-14 ENCOUNTER — APPOINTMENT (OUTPATIENT)
Dept: CT IMAGING | Facility: HOSPITAL | Age: 72
End: 2022-07-14

## 2022-07-14 ENCOUNTER — HOSPITAL ENCOUNTER (EMERGENCY)
Facility: HOSPITAL | Age: 72
Discharge: HOME | End: 2022-07-14
Attending: EMERGENCY MEDICINE | Admitting: EMERGENCY MEDICINE

## 2022-07-14 VITALS
SYSTOLIC BLOOD PRESSURE: 109 MMHG | RESPIRATION RATE: 16 BRPM | TEMPERATURE: 96.6 F | HEIGHT: 63 IN | DIASTOLIC BLOOD PRESSURE: 65 MMHG | HEART RATE: 73 BPM | OXYGEN SATURATION: 97 % | BODY MASS INDEX: 24.84 KG/M2

## 2022-07-14 DIAGNOSIS — R51.9 ACUTE NONINTRACTABLE HEADACHE, UNSPECIFIED HEADACHE TYPE: Primary | ICD-10-CM

## 2022-07-14 LAB
ALBUMIN SERPL-MCNC: 4.3 G/DL (ref 3.5–5.2)
ALBUMIN/GLOB SERPL: 1.4 G/DL
ALP SERPL-CCNC: 71 U/L (ref 39–117)
ALT SERPL W P-5'-P-CCNC: 18 U/L (ref 1–33)
ANION GAP SERPL CALCULATED.3IONS-SCNC: 8 MMOL/L (ref 5–15)
APTT PPP: 28.3 SECONDS (ref 22.7–35.4)
AST SERPL-CCNC: 27 U/L (ref 1–32)
BACTERIA UR QL AUTO: ABNORMAL /HPF
BASOPHILS # BLD AUTO: 0.02 10*3/MM3 (ref 0–0.2)
BASOPHILS NFR BLD AUTO: 0.6 % (ref 0–1.5)
BILIRUB SERPL-MCNC: 1 MG/DL (ref 0–1.2)
BILIRUB UR QL STRIP: NEGATIVE
BUN SERPL-MCNC: 9 MG/DL (ref 8–23)
BUN/CREAT SERPL: 10.8 (ref 7–25)
CALCIUM SPEC-SCNC: 9.4 MG/DL (ref 8.6–10.5)
CHLORIDE SERPL-SCNC: 103 MMOL/L (ref 98–107)
CLARITY UR: CLEAR
CO2 SERPL-SCNC: 31 MMOL/L (ref 22–29)
COLOR UR: YELLOW
CREAT SERPL-MCNC: 0.83 MG/DL (ref 0.57–1)
DEPRECATED RDW RBC AUTO: 40.9 FL (ref 37–54)
EGFRCR SERPLBLD CKD-EPI 2021: 74.5 ML/MIN/1.73
EOSINOPHIL # BLD AUTO: 0.05 10*3/MM3 (ref 0–0.4)
EOSINOPHIL NFR BLD AUTO: 1.4 % (ref 0.3–6.2)
ERYTHROCYTE [DISTWIDTH] IN BLOOD BY AUTOMATED COUNT: 12 % (ref 12.3–15.4)
GLOBULIN UR ELPH-MCNC: 3 GM/DL
GLUCOSE SERPL-MCNC: 93 MG/DL (ref 65–99)
GLUCOSE UR STRIP-MCNC: NEGATIVE MG/DL
HCT VFR BLD AUTO: 43.2 % (ref 34–46.6)
HGB BLD-MCNC: 14.4 G/DL (ref 12–15.9)
HGB UR QL STRIP.AUTO: ABNORMAL
HYALINE CASTS UR QL AUTO: ABNORMAL /LPF
IMM GRANULOCYTES # BLD AUTO: 0.01 10*3/MM3 (ref 0–0.05)
IMM GRANULOCYTES NFR BLD AUTO: 0.3 % (ref 0–0.5)
INR PPP: 1.05 (ref 0.9–1.1)
KETONES UR QL STRIP: NEGATIVE
LEUKOCYTE ESTERASE UR QL STRIP.AUTO: ABNORMAL
LIPASE SERPL-CCNC: 25 U/L (ref 13–60)
LYMPHOCYTES # BLD AUTO: 1.13 10*3/MM3 (ref 0.7–3.1)
LYMPHOCYTES NFR BLD AUTO: 31.7 % (ref 19.6–45.3)
MCH RBC QN AUTO: 31.4 PG (ref 26.6–33)
MCHC RBC AUTO-ENTMCNC: 33.3 G/DL (ref 31.5–35.7)
MCV RBC AUTO: 94.1 FL (ref 79–97)
MONOCYTES # BLD AUTO: 0.33 10*3/MM3 (ref 0.1–0.9)
MONOCYTES NFR BLD AUTO: 9.3 % (ref 5–12)
NEUTROPHILS NFR BLD AUTO: 2.02 10*3/MM3 (ref 1.7–7)
NEUTROPHILS NFR BLD AUTO: 56.7 % (ref 42.7–76)
NITRITE UR QL STRIP: NEGATIVE
NRBC BLD AUTO-RTO: 0 /100 WBC (ref 0–0.2)
PH UR STRIP.AUTO: 6 [PH] (ref 5–8)
PLATELET # BLD AUTO: 199 10*3/MM3 (ref 140–450)
PMV BLD AUTO: 9.5 FL (ref 6–12)
POTASSIUM SERPL-SCNC: 3.6 MMOL/L (ref 3.5–5.2)
PROT SERPL-MCNC: 7.3 G/DL (ref 6–8.5)
PROT UR QL STRIP: NEGATIVE
PROTHROMBIN TIME: 13.6 SECONDS (ref 11.7–14.2)
QT INTERVAL: 362 MS
RBC # BLD AUTO: 4.59 10*6/MM3 (ref 3.77–5.28)
RBC # UR STRIP: ABNORMAL /HPF
REF LAB TEST METHOD: ABNORMAL
SODIUM SERPL-SCNC: 142 MMOL/L (ref 136–145)
SP GR UR STRIP: 1.01 (ref 1–1.03)
SQUAMOUS #/AREA URNS HPF: ABNORMAL /HPF
UROBILINOGEN UR QL STRIP: ABNORMAL
WBC # UR STRIP: ABNORMAL /HPF
WBC NRBC COR # BLD: 3.56 10*3/MM3 (ref 3.4–10.8)

## 2022-07-14 PROCEDURE — 93010 ELECTROCARDIOGRAM REPORT: CPT | Performed by: INTERNAL MEDICINE

## 2022-07-14 PROCEDURE — 85730 THROMBOPLASTIN TIME PARTIAL: CPT | Performed by: EMERGENCY MEDICINE

## 2022-07-14 PROCEDURE — 70496 CT ANGIOGRAPHY HEAD: CPT

## 2022-07-14 PROCEDURE — 83690 ASSAY OF LIPASE: CPT | Performed by: EMERGENCY MEDICINE

## 2022-07-14 PROCEDURE — 99284 EMERGENCY DEPT VISIT MOD MDM: CPT

## 2022-07-14 PROCEDURE — 85610 PROTHROMBIN TIME: CPT | Performed by: EMERGENCY MEDICINE

## 2022-07-14 PROCEDURE — 80053 COMPREHEN METABOLIC PANEL: CPT | Performed by: NURSE PRACTITIONER

## 2022-07-14 PROCEDURE — 93005 ELECTROCARDIOGRAM TRACING: CPT | Performed by: NURSE PRACTITIONER

## 2022-07-14 PROCEDURE — 0 IOPAMIDOL PER 1 ML: Performed by: EMERGENCY MEDICINE

## 2022-07-14 PROCEDURE — 85025 COMPLETE CBC W/AUTO DIFF WBC: CPT | Performed by: NURSE PRACTITIONER

## 2022-07-14 PROCEDURE — 81001 URINALYSIS AUTO W/SCOPE: CPT | Performed by: NURSE PRACTITIONER

## 2022-07-14 RX ORDER — SODIUM CHLORIDE 0.9 % (FLUSH) 0.9 %
10 SYRINGE (ML) INJECTION AS NEEDED
Status: DISCONTINUED | OUTPATIENT
Start: 2022-07-14 | End: 2022-07-14 | Stop reason: HOSPADM

## 2022-07-14 RX ORDER — LORAZEPAM 1 MG/1
1 TABLET ORAL ONCE
Status: COMPLETED | OUTPATIENT
Start: 2022-07-14 | End: 2022-07-14

## 2022-07-14 RX ADMIN — IOPAMIDOL 94 ML: 755 INJECTION, SOLUTION INTRAVENOUS at 09:08

## 2022-07-14 RX ADMIN — LORAZEPAM 1 MG: 1 TABLET ORAL at 09:55

## 2022-07-14 NOTE — ED TRIAGE NOTES
To ER via PV.  C/o Headache x 3 hours.  Pt states when she got up to go to bathroom she felt dizzy and still had headache after Tylenol 500mg.  Pt states normally her blood pressure is low and it was 150 syst at home.      Pt states x 2 days she has had low abd pain with gas.      Pt in mask at time of triage. Triage staff in appropriate PPE.

## 2022-07-14 NOTE — ED PROVIDER NOTES
EMERGENCY DEPARTMENT ENCOUNTER    Room Number:  01/01  Date seen:  7/14/2022  Time seen: 07:03 EDT  PCP: Julian Garg MD  Historian: pt, roommate    HPI:  Chief complaint:LLQ abdominal pain, headache, dizziness  A complete HPI/ROS/PMH/PSH/SH/FH are unobtainable due to: n/a  Context:Penny Brown is a 71 y.o. female who presents to the ED with c/o acute onset headache described as throbbing that came out of no where.  States one minute she did not have headache and the next she did.  It was rated 7-8/10 at the time.  She took of 500 mg acetaminophen at 5 AM and now currently the headache is a 2 out of 10 and it is described as vague.  She states that after the headache came shortly after around 6 this morning she got up to use the bathroom and felt as though her balance was off and she felt dizzy.  She denies any falls or injuries.  She reports she had a little bit of anxiety so she took her blood pressure at home and her systolic blood pressure was elevated about 150.  She denies neck pain, visual changes, loss of vision, double vision and has no unilateral weakness or problems thinking or speaking.  She denies any photophobia    Prior to this the past 2 days she has had some intermittent left lower quadrant abdominal pain described as sharp and spreading around like gas.  She has taken some simethicone with some improvement and she also endorses some constipation and dyspepsia.  She did eat a light diet yesterday but drink plenty of fluids.  She denied any nausea, vomiting or diarrhea.  She denies any urinary symptoms    Patient was placed in face mask in first look. Patient was wearing facemask when I entered the room and throughout our encounter. I wore full protective equipment throughout this patient encounter including a N95 face mask, eye shield and gloves. Hand hygiene/washing of hands was performed before donning protective equipment and after removal when leaving the room.    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   • Cystocele 02/17/2015   • Epiphora due to insufficient drainage of right side 01/18/2016   • Incomplete emptying of bladder 02/17/2015   • OAB (overactive bladder) 02/13/2015   • Senile cataracts of both eyes 01/18/2016   • Uterovaginal prolapse, incomplete 02/28/2015   • Hypothyroidism 07/16/2018   • Mixed hyperlipidemia 07/16/2018   • Prediabetes 07/16/2018   • Leg cramps 07/16/2018   • Vertigo 12/04/2018   • Hypotension due to hypovolemia 11/24/2020     Resolved Ambulatory Problems     Diagnosis Date Noted   • No Resolved Ambulatory Problems     Past Medical History:   Diagnosis Date   • Dizziness    • Fibrocystic breast    • GERD (gastroesophageal reflux disease)    • Hyperlipidemia    • PAD (peripheral artery disease) (HCC)    • Seizures (HCC)    • Syncopal episodes        PAST SURGICAL HISTORY  Past Surgical History:   Procedure Laterality Date   • BLADDER SURGERY  2015    bladder lift   • CERVICAL CONE BIOPSY  [id number]   • COLONOSCOPY  02/01/2014   • EYE SURGERY Right 01/19/2016    TEAR DUCT SURGERY/PARTIAL REMOVAL OF NASAL SINUS   • EYE SURGERY Left 05/13/2011    TEAR DUCT SURGERY/DACRYOCYSTIRTHINOSTOMY AND REMOVAL OF ETHMOID SINUS   • HYSTERECTOMY  03/25/2015    UTEROSACRAL SUSPENSION AND HYSTERECTOMY   • OOPHORECTOMY     • PAP SMEAR  01/18/2018   • VOCAL CORD BIOPSY  08/12/2009       FAMILY HISTORY  Family History   Problem Relation Age of Onset   • Diabetes Maternal Grandmother    • Heart failure Mother    • Thyroid disease Mother    • Lung disease Brother    • Alcohol abuse Maternal Aunt    • Cancer Maternal Aunt    • Breast cancer Maternal Aunt    • Alcohol abuse Maternal Uncle    • Cancer Maternal Uncle    • Diabetes Maternal Uncle    • Ovarian cancer Neg Hx        SOCIAL HISTORY  Social History     Socioeconomic History   • Marital status:     • Number of children: 1   Tobacco Use   • Smoking status: Never Smoker   • Smokeless tobacco: Never Used   Vaping Use   • Vaping Use: Never used   Substance and Sexual Activity   • Alcohol use: Yes     Alcohol/week: 1.0 - 2.0 standard drink     Types: 1 - 2 Shots of liquor per week     Comment: 2-4/MONTH   • Drug use: No   • Sexual activity: Never       REVIEW OF SYSTEMS  Review of Systems    All systems reviewed and negative except for those discussed in HPI.     PHYSICAL EXAM    ED Triage Vitals [07/14/22 0658]   Temp Heart Rate Resp BP SpO2   96.6 °F (35.9 °C) 74 16 148/82 99 %      Temp src Heart Rate Source Patient Position BP Location FiO2 (%)   Tympanic -- -- -- --     Physical Exam    I have reviewed the triage vital signs and nursing notes.      GENERAL: not distressed, slightly anxious,  HENT: nares patent, mucous membranes moist, no facial droop   EYES: no scleral icterus, PERRL, EOMI  NECK: no ROM limitations  CV: regular rhythm, regular rate,  No murmur, no rubs  RESPIRATORY: normal effort, CTAB  ABDOMEN: soft  : deferred  MUSCULOSKELETAL: no deformity  NEURO: alert, moves all extremities, follows commands  Cranial nerves 2-12 intact as tested.  Sensation intact.  5/5 strength in all extremities.  Normal cerebellar testing.  No drift in any extremity.  No dysarthria.  No aphasia.  No neglect/extinction.     SKIN: warm, dry    LAB RESULTS  Recent Results (from the past 24 hour(s))   Comprehensive Metabolic Panel    Collection Time: 07/14/22  8:12 AM    Specimen: Blood   Result Value Ref Range    Glucose 93 65 - 99 mg/dL    BUN 9 8 - 23 mg/dL    Creatinine 0.83 0.57 - 1.00 mg/dL    Sodium 142 136 - 145 mmol/L    Potassium 3.6 3.5 - 5.2 mmol/L    Chloride 103 98 - 107 mmol/L    CO2 31.0 (H) 22.0 - 29.0 mmol/L    Calcium 9.4 8.6 - 10.5 mg/dL    Total Protein 7.3 6.0 - 8.5 g/dL    Albumin 4.30 3.50 - 5.20 g/dL    ALT (SGPT) 18 1 - 33 U/L    AST (SGOT) 27 1 - 32 U/L    Alkaline Phosphatase 71 39 - 117 U/L     Total Bilirubin 1.0 0.0 - 1.2 mg/dL    Globulin 3.0 gm/dL    A/G Ratio 1.4 g/dL    BUN/Creatinine Ratio 10.8 7.0 - 25.0    Anion Gap 8.0 5.0 - 15.0 mmol/L    eGFR 74.5 >60.0 mL/min/1.73   Urinalysis With Culture If Indicated - Urine, Clean Catch    Collection Time: 07/14/22  8:12 AM    Specimen: Urine, Clean Catch   Result Value Ref Range    Color, UA Yellow Yellow, Straw    Appearance, UA Clear Clear    pH, UA 6.0 5.0 - 8.0    Specific Gravity, UA 1.012 1.005 - 1.030    Glucose, UA Negative Negative    Ketones, UA Negative Negative    Bilirubin, UA Negative Negative    Blood, UA Moderate (2+) (A) Negative    Protein, UA Negative Negative    Leuk Esterase, UA Moderate (2+) (A) Negative    Nitrite, UA Negative Negative    Urobilinogen, UA 0.2 E.U./dL 0.2 - 1.0 E.U./dL   CBC Auto Differential    Collection Time: 07/14/22  8:12 AM    Specimen: Blood   Result Value Ref Range    WBC 3.56 3.40 - 10.80 10*3/mm3    RBC 4.59 3.77 - 5.28 10*6/mm3    Hemoglobin 14.4 12.0 - 15.9 g/dL    Hematocrit 43.2 34.0 - 46.6 %    MCV 94.1 79.0 - 97.0 fL    MCH 31.4 26.6 - 33.0 pg    MCHC 33.3 31.5 - 35.7 g/dL    RDW 12.0 (L) 12.3 - 15.4 %    RDW-SD 40.9 37.0 - 54.0 fl    MPV 9.5 6.0 - 12.0 fL    Platelets 199 140 - 450 10*3/mm3    Neutrophil % 56.7 42.7 - 76.0 %    Lymphocyte % 31.7 19.6 - 45.3 %    Monocyte % 9.3 5.0 - 12.0 %    Eosinophil % 1.4 0.3 - 6.2 %    Basophil % 0.6 0.0 - 1.5 %    Immature Grans % 0.3 0.0 - 0.5 %    Neutrophils, Absolute 2.02 1.70 - 7.00 10*3/mm3    Lymphocytes, Absolute 1.13 0.70 - 3.10 10*3/mm3    Monocytes, Absolute 0.33 0.10 - 0.90 10*3/mm3    Eosinophils, Absolute 0.05 0.00 - 0.40 10*3/mm3    Basophils, Absolute 0.02 0.00 - 0.20 10*3/mm3    Immature Grans, Absolute 0.01 0.00 - 0.05 10*3/mm3    nRBC 0.0 0.0 - 0.2 /100 WBC   Protime-INR    Collection Time: 07/14/22  8:12 AM    Specimen: Blood   Result Value Ref Range    Protime 13.6 11.7 - 14.2 Seconds    INR 1.05 0.90 - 1.10   aPTT    Collection Time:  07/14/22  8:12 AM    Specimen: Blood   Result Value Ref Range    PTT 28.3 22.7 - 35.4 seconds   Lipase    Collection Time: 07/14/22  8:12 AM    Specimen: Blood   Result Value Ref Range    Lipase 25 13 - 60 U/L   Urinalysis, Microscopic Only - Urine, Clean Catch    Collection Time: 07/14/22  8:12 AM    Specimen: Urine, Clean Catch   Result Value Ref Range    RBC, UA 6-12 (A) None Seen, 0-2 /HPF    WBC, UA 3-5 (A) None Seen, 0-2 /HPF    Bacteria, UA None Seen None Seen /HPF    Squamous Epithelial Cells, UA 0-2 None Seen, 0-2 /HPF    Hyaline Casts, UA 0-2 None Seen /LPF    Methodology Automated Microscopy    ECG 12 Lead    Collection Time: 07/14/22  9:24 AM   Result Value Ref Range    QT Interval 362 ms         RADIOLOGY RESULTS  CT Angiogram Head    Result Date: 7/14/2022  CT OF THE BRAIN WITH AND WITHOUT CONTRAST AND CT ANGIOGRAPHY OF THE BRAIN WITH CONTRAST INCLUDING RECONSTRUCTION IMAGES 07/14/2022  HISTORY: Severe headache. Possible aneurysm.  Axial images were obtained through the brain without intravenous contrast. Brain parenchyma and ventricular system appear within normal limits. No mass lesions, midline shift, intracranial hemorrhage or evidence of infarction is demonstrated.  Following the intravenous contrast injection CT angiography was performed through the brain. Sagittal, coronal and 3D reconstruction images were reviewed.  The distal bilateral vertebral arteries and the basilar artery and its branches appear patent.  Bilateral internal carotid arteries appear patent. Tiny amount of distal left carotid artery calcification is seen. Bilateral middle and anterior cerebral arteries appear patent.  No aneurysm is seen. No vascular occlusion, vascular malformation or thrombus is seen.  Postcontrast CT of the brain shows no abnormal enhancement.      No acute process identified on CT of the brain with and without contrast and CT angiography of the head.   Radiation dose reduction techniques were utilized,  including automated exposure control and exposure modulation based on body size.  This report was finalized on 7/14/2022 10:58 AM by Dr. Rico Crane M.D.           PROGRESS, DATA ANALYSIS, CONSULTS AND MEDICAL DECISION MAKING  All labs have been independently reviewed by me.  All radiology studies have been reviewed by me and discussed with radiologist dictating the report.  EKG's independently viewed and interpreted by me unless stated otherwise. Discussion below represents my analysis of pertinent findings related to patient's condition, differential diagnosis, treatment plan and final disposition.     ED Course as of 07/14/22 1253   Thu Jul 14, 2022   0707 I ambulated the patient.  She walks with a steady gait with no drifting. [EW]   0739 Patient presents with thunderclap headache, headaches dramatically improved but patient does have a family history of aneurysm.  Obtaining CT imaging of the head with CT angiogram to evaluate for possible subarachnoid or aneurysm.  Patient also complaining of abdominal pain but pain is mild, patient eating and drinking normally, benign abdominal exam.  Obtaining screening lab work but at present abdominal pain seems benign and likely gas pain as patient states. [JG]   0929 EKG independently viewed and contemporaneously interpreted by ED physician. Time: 9:24 AM.  Rate 93.  Interpretation: Normal sinus rhythm, normal axis, borderline first-degree AV block, normal QRS, no acute ST changes. [JG]   0929 Pt very anxious after returning from CT scan.  States she feels she is quivering.  EKG ordered and dose of ativan as patient has h/o anxiety.   [EW]   1012 Discussed CTA with Dr. Crane, Radiologist.  No acute abnormalities.  [EW]   1038 MDM: I updated patient about negative brain imaging.  Her anxiety is already improving after dose of Ativan.  I discussed unremarkable labs and the need to follow-up with her primary care provider.  She has no acute neurological deficits.   "She is able to ambulate with a steady gait.  Her urine does not appear infected. [EW]      ED Course User Index  [EW] Liudmila Wu APRN  [JG] Palmer Chiang MD       Reviewed pt's history and workup with Dr. Chiang.  After a bedside evaluation, Dr. Chiang agrees with the plan of care.    The patient's history, physical exam, and lab findings were discussed with the physician, who also performed a face to face history and physical exam.  I discussed all results and noted any abnormalities with patient.  Discussed absoute need to recheck abnormalities with their family physician.  I answered any of the patient's questions.  Discussed plan for discharge, as there is no emergent indication for admission.  Pt is agreeable and understands need for follow up and repeat testing.  Pt is aware that discharge does not mean that nothing is wrong but it indicates no emergency is present and they must continue care with their family physician.  Pt is discharged with instructions to follow up with primary care doctor to have their blood pressure rechecked.         Disposition vitals:  /65   Pulse 73   Temp 96.6 °F (35.9 °C) (Tympanic)   Resp 16   Ht 158.8 cm (62.5\")   SpO2 97%   BMI 24.84 kg/m²       DIAGNOSIS  Final diagnoses:   Acute nonintractable headache, unspecified headache type       FOLLOW UP   Julian Garg MD  [id number] [id number] [phone]    Schedule an appointment as soon as possible for a visit            Liudmila Wu APRN  07/14/22 1254    "

## 2022-07-14 NOTE — ED PROVIDER NOTES
MD ATTESTATION NOTE  I wore full protective equipment throughout this patient encounter including an N95 face mask, googles, gown and gloves. Hand hygiene was performed before donning protective equipment and after removal when leaving the room.    The RADHA and I have discussed this patient's history, physical exam, and treatment plan. I have reviewed the documentation and personally had a face to face interaction with the patient. I affirm the [facility] documentation and agree with their diagnostics, findings, treatment, plan, and disposition.    I provided a substantive portion of the care of this patient.  I personally performed the physical exam, in its entirety.  The attached note describes my personal findings.    Penny Brown is a 71 y.o. female who presents to the ED c/o headache.  Patient complains of sudden onset headache that began at approximately 530 after return to the bathroom.  Patient reports that she had been up and was at baseline health prior to onset of headache.  Headache was maximal intensity at onset, frontal and on the sides of her head, described as throbbing.  Patient denied any sensitivity to light or noise, no visual disturbances, did have some nausea, no vomiting, no neck pain or stiffness, no weakness or numbness.  Patient reports headache improved with Tylenol.  Patient reports that when she went up to go to the bathroom again at approximately 6:00 she felt slightly dizzy and off-balance, denied any double vision, no difficulty with coordination.  Patient denies any dizziness at present.  Patient denies any history of similar headaches in the past, is not on a blood thinner, denies any history of brain mass, denies any history of intracranial hemorrhage, denies any history of aneurysm.  Patient does report family history of aneurysm.  Patient reports that she has been having intermittent left lower quadrant pain described as gas pain.  Patient reports that she took simethicone with  improvement.  Patient endorses nausea, denies any vomiting.  Patient reports that she is constipated but had a bowel movement yesterday.  Patient denies any dysuria, no increased urinary frequency or urgency.    On exam:  General: NAD.  Head: NCAT.  ENT: nares patent, no scleral icterus  Neck: Supple, trachea midline. No nuchal rigidity.    Cardiac: regular rate and rhythm.  Lungs: normal effort.  Abdomen: Soft, nondistended, nontender to palpation, no rebound tenderness, no guarding or rigidity..   Extremities: Moves all extremities well, no peripheral edema  Neuro: Alert and oriented x3, extraocular motion intact, pupils are equal and round reactive to light, cranial nerves II through XII are grossly intact, normal speech, moves all extremities well, 5 out of 5 strength all 4 extremities, sensation intact light touch all 4 extremities, no ataxia.  Psych: calm, cooperative  Skin: Warm, dry.    Medical Decision Making:  After the initial H&P, I discussed pertinent information from history and physical exam with patient/family.  Discussed differential diagnosis.  Discussed plan for ED evaluation/work-up/treatment.  All questions answered.  Patient/family is agreeable with plan.    ED Course as of 07/14/22 1430   Thu Jul 14, 2022   0707 I ambulated the patient.  She walks with a steady gait with no drifting. [EW]   0739 Patient presents with thunderclap headache, headaches dramatically improved but patient does have a family history of aneurysm.  Obtaining CT imaging of the head with CT angiogram to evaluate for possible subarachnoid or aneurysm.  Patient also complaining of abdominal pain but pain is mild, patient eating and drinking normally, benign abdominal exam.  Obtaining screening lab work but at present abdominal pain seems benign and likely gas pain as patient states. [JG]   1172 EKG independently viewed and contemporaneously interpreted by ED physician. Time: 9:24 AM.  Rate 93.  Interpretation: Normal sinus  rhythm, normal axis, borderline first-degree AV block, normal QRS, no acute ST changes. [JG]   0929 Pt very anxious after returning from CT scan.  States she feels she is quivering.  EKG ordered and dose of ativan as patient has h/o anxiety.   [EW]   1012 Discussed CTA with Dr. Crane, Radiologist.  No acute abnormalities.  [EW]   1038 MDM: I updated patient about negative brain imaging.  Her anxiety is already improving after dose of Ativan.  I discussed unremarkable labs and the need to follow-up with her primary care provider.  She has no acute neurological deficits.  She is able to ambulate with a steady gait.  Her urine does not appear infected. [EW]      ED Course User Index  [EW] Liudmila Wu APRN  [JG] Palmer Chiang MD       Diagnosis  Final diagnoses:   Acute nonintractable headache, unspecified headache type        Palmer Chiang MD  07/14/22 4638

## 2022-07-15 RX ORDER — DICYCLOMINE HYDROCHLORIDE 10 MG/1
10 CAPSULE ORAL
Qty: 120 CAPSULE | Refills: 0 | Status: SHIPPED | OUTPATIENT
Start: 2022-07-15 | End: 2023-03-02

## 2022-07-15 NOTE — TELEPHONE ENCOUNTER
Called pt to give info, pt stated that she had went to the ER and had been feeling a bit better due to meds from OTC. Pt requested a new prescription of Dicyclomine to be sent to the pharmacy. Sent that in for pt.

## 2022-07-15 NOTE — TELEPHONE ENCOUNTER
She can try the dicyclomine that she currently has.  However with any abdominal discomfort if it is present and not improving, she may need to be further evaluated in the emergency room.

## 2022-07-21 ENCOUNTER — OFFICE VISIT (OUTPATIENT)
Dept: INTERNAL MEDICINE | Facility: CLINIC | Age: 72
End: 2022-07-21

## 2022-07-21 VITALS
OXYGEN SATURATION: 97 % | SYSTOLIC BLOOD PRESSURE: 110 MMHG | WEIGHT: 135.2 LBS | BODY MASS INDEX: 24.88 KG/M2 | DIASTOLIC BLOOD PRESSURE: 64 MMHG | HEART RATE: 66 BPM | RESPIRATION RATE: 18 BRPM | HEIGHT: 62 IN

## 2022-07-21 DIAGNOSIS — F41.9 ANXIETY: ICD-10-CM

## 2022-07-21 DIAGNOSIS — R51.9 ACUTE NONINTRACTABLE HEADACHE, UNSPECIFIED HEADACHE TYPE: Primary | ICD-10-CM

## 2022-07-21 DIAGNOSIS — F41.0 PANIC ATTACK: ICD-10-CM

## 2022-07-21 PROCEDURE — 99214 OFFICE O/P EST MOD 30 MIN: CPT | Performed by: FAMILY MEDICINE

## 2022-07-21 RX ORDER — LORAZEPAM 0.5 MG/1
0.5 TABLET ORAL EVERY 8 HOURS PRN
Qty: 10 TABLET | Refills: 0 | Status: SHIPPED | OUTPATIENT
Start: 2022-07-21

## 2022-07-21 NOTE — PROGRESS NOTES
"Chief Complaint  No chief complaint on file.  Chief complaint anxiety and headache.  Subjective        Penny Brown presents to [facility]  History of Present Illness    Patient presented today's office visit after recently going to the emergency room last week due to a significant headache that started.  She states that while she was in the emergency room she had a CT scan that did not find any findings of any subarachnoid hemorrhage or any bleeds.  It appears that her headache could all be related due to stress.  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  Therefore her their relationship is a bit complicated as businesses involved.  However she believes that she has a lot of stress from her, especially since she has become an alcoholic.  We have discussed that these triggers could be removed if this person were to leave her home, however the likelihood of this actually taking place may be slim.    Patient does tell me that she has a history of having significant panic attacks, and these panic attacks can be full-blown.  She is currently not taking any medications for this.  And she most likely had a panic attack while she was in the emergency room last week, as Ativan was given to her and she started feeling significantly better.    As for the headaches, she is currently does not have a headache nor has the headache returned.  She seems to be doing fairly well as far as that is concerned.    Objective   Vital Signs:  /64   Pulse 66   Resp 18   Ht 157.5 cm (62\")   Wt 61.3 kg (135 lb 3.2 oz)   SpO2 97%   BMI 24.73 kg/m²   Estimated body mass index is 24.73 kg/m² as calculated from the following:    Height as of this encounter: 157.5 cm (62\").    Weight as of this encounter: 61.3 kg (135 lb 3.2 oz).    BMI is within normal parameters. No other follow-up for BMI " required.      Physical Exam  Vitals and nursing note reviewed.   Constitutional:       Appearance: She is well-developed.   HENT:      Head: Normocephalic and atraumatic.   Musculoskeletal:      Cervical back: Normal range of motion and neck supple.   Neurological:      Mental Status: She is alert and oriented to person, place, and time.   Psychiatric:         Behavior: Behavior normal.        Result Review :                Assessment and Plan   Diagnoses and all orders for this visit:    1. Acute nonintractable headache, unspecified headache type (Primary)    2. Anxiety  -     LORazepam (Ativan) 0.5 MG tablet; Take 1 tablet by mouth Every 8 (Eight) Hours As Needed for Anxiety.  Dispense: 10 tablet; Refill: 0    3. Panic attack  -     LORazepam (Ativan) 0.5 MG tablet; Take 1 tablet by mouth Every 8 (Eight) Hours As Needed for Anxiety.  Dispense: 10 tablet; Refill: 0    Headaches are currently resolved.  I did discuss with her that I would like her to work on her anxiety, and she may need mental health therapy for this.  I am not sure exactly how much she is willing to do to solve the current issues at home that can help her with anxiety.  I did give her a few tablets of Ativan to take as needed in case she does have a panic attack or has severe anxiety.         Follow Up   No follow-ups on file.  Patient was given instructions and counseling regarding her condition or for health maintenance advice. Please see specific information pulled into the AVS if appropriate.

## 2022-08-17 ENCOUNTER — TELEPHONE (OUTPATIENT)
Dept: INTERNAL MEDICINE | Facility: CLINIC | Age: 72
End: 2022-08-17

## 2022-08-17 RX ORDER — GUAIFENESIN AND CODEINE PHOSPHATE 100; 10 MG/5ML; MG/5ML
5 SOLUTION ORAL 3 TIMES DAILY PRN
Qty: 150 ML | Refills: 0 | Status: SHIPPED | OUTPATIENT
Start: 2022-08-17

## 2022-08-17 RX ORDER — GUAIFENESIN AND CODEINE PHOSPHATE 100; 10 MG/5ML; MG/5ML
5 SOLUTION ORAL 3 TIMES DAILY PRN
Qty: 150 ML | Refills: 0 | Status: SHIPPED | OUTPATIENT
Start: 2022-08-17 | End: 2022-08-17 | Stop reason: SDUPTHER

## 2022-08-17 NOTE — TELEPHONE ENCOUNTER
Caller: Penny Brown    Relationship: Self    Best call back number: [phone] (H)    What medication are you requesting: SOMETHING FOR A COUGH      What are your current symptoms: COUGHING AND HEADACHE    How long have you been experiencing symptoms: 2 WEEKS    Have you had these symptoms before:    [] Yes  [] No    Have you been treated for these symptoms before:   [] Yes  [] No    If a prescription is needed, what is your preferred pharmacy and phone number:    [facility] [employer] [id number] - [place], [place] - [address] AT [facility] [address]  - [phone]  - [phone] FX      Additional notes: PATIENT CALLED TO ADVISE THAT SHE HAS HAD THIS COUGH FOR 2 WEEKS NOW AND SHE HAS BEEN TO THE URGENT CARE WITHIN THIS 2 WEEKS. PATIENT STATES THAT THEY ADVISED HER TO TAKE MUCINEX AND IT IS NOT HELPING HER.     PLEASE CONTACT PATIENT TO ADVISE.      THANKS

## 2022-10-31 ENCOUNTER — TRANSCRIBE ORDERS (OUTPATIENT)
Dept: ADMINISTRATIVE | Facility: HOSPITAL | Age: 72
End: 2022-10-31

## 2022-10-31 DIAGNOSIS — Z12.31 SCREENING MAMMOGRAM FOR BREAST CANCER: Primary | ICD-10-CM

## 2022-12-05 ENCOUNTER — OFFICE VISIT (OUTPATIENT)
Dept: INTERNAL MEDICINE | Facility: CLINIC | Age: 72
End: 2022-12-05
Payer: MEDICARE

## 2022-12-05 VITALS
OXYGEN SATURATION: 98 % | SYSTOLIC BLOOD PRESSURE: 115 MMHG | BODY MASS INDEX: 25.95 KG/M2 | HEART RATE: 62 BPM | HEIGHT: 62 IN | DIASTOLIC BLOOD PRESSURE: 68 MMHG | WEIGHT: 141 LBS

## 2022-12-05 DIAGNOSIS — E03.9 HYPOTHYROIDISM, UNSPECIFIED TYPE: ICD-10-CM

## 2022-12-05 DIAGNOSIS — R73.03 PREDIABETES: ICD-10-CM

## 2022-12-05 DIAGNOSIS — E78.5 HYPERLIPIDEMIA, UNSPECIFIED HYPERLIPIDEMIA TYPE: ICD-10-CM

## 2022-12-05 DIAGNOSIS — Z20.822 CLOSE EXPOSURE TO COVID-19 VIRUS: Primary | ICD-10-CM

## 2022-12-05 DIAGNOSIS — E07.9 THYROID DISEASE: ICD-10-CM

## 2022-12-05 PROCEDURE — 99214 OFFICE O/P EST MOD 30 MIN: CPT | Performed by: FAMILY MEDICINE

## 2022-12-05 NOTE — PROGRESS NOTES
"Chief Complaint  Annual Exam (physical)    Subjective        Penny Brown presents to [facility]  History of Present Illness    Patient has history of hypothyroidism.  Patient currently levothyroxine 25 mcg daily.  Patient denies any side effects of the medication.    Patient has a past medical history having hyperlipidemia.  Patient is currently taking Crestor 20 mg daily.  Patient denies any side effects of the medicine.    Patient does have prediabetes.  Patient is currently monitoring with diet and exercise.    Patient is curious to see if she has some COVID antibodies.    Objective   Vital Signs:  /68 (BP Location: Left arm, Patient Position: Sitting, Cuff Size: Adult)   Pulse 62   Ht 157 cm (61.81\")   Wt 64 kg (141 lb)   SpO2 98%   BMI 25.95 kg/m²   Estimated body mass index is 25.95 kg/m² as calculated from the following:    Height as of this encounter: 157 cm (61.81\").    Weight as of this encounter: 64 kg (141 lb).          Physical Exam  Vitals and nursing note reviewed.   Constitutional:       Appearance: She is well-developed.   HENT:      Head: Normocephalic and atraumatic.   Musculoskeletal:      Cervical back: Normal range of motion and neck supple.   Neurological:      Mental Status: She is alert and oriented to person, place, and time.   Psychiatric:         Behavior: Behavior normal.        Result Review :                Assessment and Plan   Diagnoses and all orders for this visit:    1. Close exposure to COVID-19 virus (Primary)  -     SARS-CoV-2 Semi-Quant Total Ab  -     CBC & Differential; Future    2. Hyperlipidemia, unspecified hyperlipidemia type  -     Lipid Panel With LDL / HDL Ratio; Future  -     Comprehensive Metabolic Panel; Future    3. Thyroid disease  -     Thyroid Panel With TSH; Future    4. Prediabetes  -     Hemoglobin A1c; Future    5. Hypothyroidism, unspecified type  -     Thyroid Panel With TSH; Future      Will test for COVID " antibodies at today's visit.  For the hyperlipidemia, continue taking Crestor 20 mg daily.  We will check a lipid panel.  For the prediabetes continue diet and exercise.  For the hypothyroidism, continue levothyroxine 25 mcg daily.       Follow Up   No follow-ups on file.  Patient was given instructions and counseling regarding her condition or for health maintenance advice. Please see specific information pulled into the AVS if appropriate.

## 2022-12-06 LAB
SARS-COV-2 AB SERPL IA-ACNC: NORMAL U/ML
SARS-COV-2 AB SERPL IA-ACNC: NORMAL U/ML
SARS-COV-2 AB SERPL-IMP: POSITIVE

## 2022-12-06 NOTE — PROGRESS NOTES
Please inform the patient of the following abnormal results. She has significant elevate levels of titers. I think this can be protective. If she wants a covid vaccine she should take one atleast more than 6 mos from the last covid booster.

## 2022-12-16 ENCOUNTER — APPOINTMENT (OUTPATIENT)
Dept: MAMMOGRAPHY | Facility: HOSPITAL | Age: 72
End: 2022-12-16

## 2023-01-05 ENCOUNTER — APPOINTMENT (OUTPATIENT)
Dept: MAMMOGRAPHY | Facility: HOSPITAL | Age: 73
End: 2023-01-05

## 2023-01-17 ENCOUNTER — HOSPITAL ENCOUNTER (OUTPATIENT)
Dept: MAMMOGRAPHY | Facility: HOSPITAL | Age: 73
Discharge: HOME | End: 2023-01-17
Admitting: FAMILY MEDICINE
Payer: MEDICARE

## 2023-01-17 DIAGNOSIS — Z12.31 SCREENING MAMMOGRAM FOR BREAST CANCER: ICD-10-CM

## 2023-01-17 PROCEDURE — 77063 BREAST TOMOSYNTHESIS BI: CPT

## 2023-01-17 PROCEDURE — 77067 SCR MAMMO BI INCL CAD: CPT

## 2023-01-18 ENCOUNTER — OFFICE VISIT (OUTPATIENT)
Dept: INTERNAL MEDICINE | Facility: CLINIC | Age: 73
End: 2023-01-18
Payer: MEDICARE

## 2023-01-18 ENCOUNTER — LAB (OUTPATIENT)
Dept: LAB | Facility: HOSPITAL | Age: 73
End: 2023-01-18
Payer: MEDICARE

## 2023-01-18 VITALS
HEIGHT: 63 IN | HEART RATE: 73 BPM | RESPIRATION RATE: 16 BRPM | WEIGHT: 140.9 LBS | BODY MASS INDEX: 24.96 KG/M2 | OXYGEN SATURATION: 95 % | SYSTOLIC BLOOD PRESSURE: 110 MMHG | DIASTOLIC BLOOD PRESSURE: 70 MMHG

## 2023-01-18 DIAGNOSIS — Z00.00 WELL WOMAN EXAM (NO GYNECOLOGICAL EXAM): Primary | ICD-10-CM

## 2023-01-18 DIAGNOSIS — Z23 NEED FOR PNEUMOCOCCAL VACCINATION: ICD-10-CM

## 2023-01-18 DIAGNOSIS — Z00.00 HEALTHCARE MAINTENANCE: ICD-10-CM

## 2023-01-18 DIAGNOSIS — Z00.00 MEDICARE ANNUAL WELLNESS VISIT, SUBSEQUENT: ICD-10-CM

## 2023-01-18 LAB
ALBUMIN SERPL-MCNC: 4.3 G/DL (ref 3.5–5.2)
ALBUMIN/GLOB SERPL: 1.6 G/DL
ALP SERPL-CCNC: 60 U/L (ref 39–117)
ALT SERPL W P-5'-P-CCNC: 28 U/L (ref 1–33)
ANION GAP SERPL CALCULATED.3IONS-SCNC: 6.7 MMOL/L (ref 5–15)
AST SERPL-CCNC: 33 U/L (ref 1–32)
BASOPHILS # BLD AUTO: 0.01 10*3/MM3 (ref 0–0.2)
BASOPHILS NFR BLD AUTO: 0.3 % (ref 0–1.5)
BILIRUB SERPL-MCNC: 0.7 MG/DL (ref 0–1.2)
BUN SERPL-MCNC: 11 MG/DL (ref 8–23)
BUN/CREAT SERPL: 12.1 (ref 7–25)
CALCIUM SPEC-SCNC: 9.5 MG/DL (ref 8.6–10.5)
CHLORIDE SERPL-SCNC: 104 MMOL/L (ref 98–107)
CHOLEST SERPL-MCNC: 153 MG/DL (ref 0–200)
CO2 SERPL-SCNC: 30.3 MMOL/L (ref 22–29)
CREAT SERPL-MCNC: 0.91 MG/DL (ref 0.57–1)
DEPRECATED RDW RBC AUTO: 40 FL (ref 37–54)
EGFRCR SERPLBLD CKD-EPI 2021: 66.3 ML/MIN/1.73
EOSINOPHIL # BLD AUTO: 0.06 10*3/MM3 (ref 0–0.4)
EOSINOPHIL NFR BLD AUTO: 1.7 % (ref 0.3–6.2)
ERYTHROCYTE [DISTWIDTH] IN BLOOD BY AUTOMATED COUNT: 11.9 % (ref 12.3–15.4)
GLOBULIN UR ELPH-MCNC: 2.7 GM/DL
GLUCOSE SERPL-MCNC: 91 MG/DL (ref 65–99)
HBA1C MFR BLD: 6 % (ref 4.8–5.6)
HCT VFR BLD AUTO: 40.7 % (ref 34–46.6)
HDLC SERPL-MCNC: 56 MG/DL (ref 40–60)
HGB BLD-MCNC: 13.2 G/DL (ref 12–15.9)
IMM GRANULOCYTES # BLD AUTO: 0.01 10*3/MM3 (ref 0–0.05)
IMM GRANULOCYTES NFR BLD AUTO: 0.3 % (ref 0–0.5)
LDLC SERPL CALC-MCNC: 80 MG/DL (ref 0–100)
LDLC/HDLC SERPL: 1.41 {RATIO}
LYMPHOCYTES # BLD AUTO: 1.31 10*3/MM3 (ref 0.7–3.1)
LYMPHOCYTES NFR BLD AUTO: 36.1 % (ref 19.6–45.3)
MCH RBC QN AUTO: 29.9 PG (ref 26.6–33)
MCHC RBC AUTO-ENTMCNC: 32.4 G/DL (ref 31.5–35.7)
MCV RBC AUTO: 92.1 FL (ref 79–97)
MONOCYTES # BLD AUTO: 0.34 10*3/MM3 (ref 0.1–0.9)
MONOCYTES NFR BLD AUTO: 9.4 % (ref 5–12)
NEUTROPHILS NFR BLD AUTO: 1.9 10*3/MM3 (ref 1.7–7)
NEUTROPHILS NFR BLD AUTO: 52.2 % (ref 42.7–76)
NRBC BLD AUTO-RTO: 0 /100 WBC (ref 0–0.2)
PLATELET # BLD AUTO: 217 10*3/MM3 (ref 140–450)
PMV BLD AUTO: 9.7 FL (ref 6–12)
POTASSIUM SERPL-SCNC: 4.3 MMOL/L (ref 3.5–5.2)
PROT SERPL-MCNC: 7 G/DL (ref 6–8.5)
RBC # BLD AUTO: 4.42 10*6/MM3 (ref 3.77–5.28)
SODIUM SERPL-SCNC: 141 MMOL/L (ref 136–145)
T-UPTAKE NFR SERPL: 1.09 TBI (ref 0.8–1.3)
T4 SERPL-MCNC: 8.92 MCG/DL (ref 4.5–11.7)
TRIGL SERPL-MCNC: 91 MG/DL (ref 0–150)
TSH SERPL DL<=0.05 MIU/L-ACNC: 3.33 UIU/ML (ref 0.27–4.2)
VLDLC SERPL-MCNC: 17 MG/DL (ref 5–40)
WBC NRBC COR # BLD: 3.63 10*3/MM3 (ref 3.4–10.8)

## 2023-01-18 PROCEDURE — 84479 ASSAY OF THYROID (T3 OR T4): CPT | Performed by: FAMILY MEDICINE

## 2023-01-18 PROCEDURE — 85025 COMPLETE CBC W/AUTO DIFF WBC: CPT | Performed by: FAMILY MEDICINE

## 2023-01-18 PROCEDURE — 1170F FXNL STATUS ASSESSED: CPT | Performed by: FAMILY MEDICINE

## 2023-01-18 PROCEDURE — 90677 PCV20 VACCINE IM: CPT | Performed by: FAMILY MEDICINE

## 2023-01-18 PROCEDURE — 1160F RVW MEDS BY RX/DR IN RCRD: CPT | Performed by: FAMILY MEDICINE

## 2023-01-18 PROCEDURE — 84436 ASSAY OF TOTAL THYROXINE: CPT | Performed by: FAMILY MEDICINE

## 2023-01-18 PROCEDURE — 84443 ASSAY THYROID STIM HORMONE: CPT | Performed by: FAMILY MEDICINE

## 2023-01-18 PROCEDURE — 80053 COMPREHEN METABOLIC PANEL: CPT | Performed by: FAMILY MEDICINE

## 2023-01-18 PROCEDURE — G0439 PPPS, SUBSEQ VISIT: HCPCS | Performed by: FAMILY MEDICINE

## 2023-01-18 PROCEDURE — G0009 ADMIN PNEUMOCOCCAL VACCINE: HCPCS | Performed by: FAMILY MEDICINE

## 2023-01-18 PROCEDURE — 80061 LIPID PANEL: CPT | Performed by: FAMILY MEDICINE

## 2023-01-18 PROCEDURE — 83036 HEMOGLOBIN GLYCOSYLATED A1C: CPT | Performed by: FAMILY MEDICINE

## 2023-01-18 PROCEDURE — 36415 COLL VENOUS BLD VENIPUNCTURE: CPT | Performed by: FAMILY MEDICINE

## 2023-01-18 PROCEDURE — 99397 PER PM REEVAL EST PAT 65+ YR: CPT | Performed by: FAMILY MEDICINE

## 2023-01-18 NOTE — PROGRESS NOTES
Subjective   Penny Brown is a 72 y.o. female and is here for a comprehensive physical exam. The patient reports no problems.    Pt is UTD with annual gyn exam and mammo           Social History:   Social History     Socioeconomic History   • Marital status:    • Number of children: 1   Tobacco Use   • Smoking status: Never   • Smokeless tobacco: Never   Vaping Use   • Vaping Use: Never used   Substance and Sexual Activity   • Alcohol use: Yes     Alcohol/week: 1.0 - 2.0 standard drink     Types: 1 - 2 Shots of liquor per week     Comment: 2-4/MONTH   • Drug use: No   • Sexual activity: Never       Family History:   Family History   Problem Relation Age of Onset   • Diabetes Maternal Grandmother    • Heart failure Mother    • Thyroid disease Mother    • Lung disease Brother    • Alcohol abuse Maternal Aunt    • Cancer Maternal Aunt    • Breast cancer Maternal Aunt    • Alcohol abuse Maternal Uncle    • Cancer Maternal Uncle    • Diabetes Maternal Uncle    • Ovarian cancer Neg Hx        Past Medical History:   Past Medical History:   Diagnosis Date   • Dizziness    • Fibrocystic breast    • GERD (gastroesophageal reflux disease)    • Hyperlipidemia    • Hypothyroidism    • PAD (peripheral artery disease) (HCC)    • Seizures (HCC)     as a child; LAST SEIZURE AT AGE 28   • Syncopal episodes        The following portions of the patient's history were reviewed and updated as appropriate: allergies, current medications, past family history, past medical history, past social history, past surgical history and problem list.    Review of Systems    Review of Systems   Constitutional: Negative for chills and fever.   HENT: Negative for congestion, rhinorrhea, sinus pain and sore throat.    Eyes: Negative for photophobia and visual disturbance.   Respiratory: Negative for cough, chest tightness and shortness of breath.    Cardiovascular: Negative for chest pain and palpitations.   Gastrointestinal: Negative for  diarrhea, nausea and vomiting.   Genitourinary: Negative for dysuria, frequency and urgency.   Skin: Negative for rash and wound.   Neurological: Negative for dizziness and syncope.   Psychiatric/Behavioral: Negative for behavioral problems and confusion.       Objective   Physical Exam  Vitals and nursing note reviewed.   Constitutional:       Appearance: She is well-developed.   HENT:      Head: Normocephalic and atraumatic.      Right Ear: External ear normal.      Left Ear: External ear normal.   Cardiovascular:      Rate and Rhythm: Normal rate and regular rhythm.      Heart sounds: Normal heart sounds.   Pulmonary:      Effort: Pulmonary effort is normal. No respiratory distress.      Breath sounds: Normal breath sounds.   Abdominal:      Palpations: Abdomen is soft.      Tenderness: There is no abdominal tenderness. There is no guarding.   Musculoskeletal:         General: Normal range of motion.      Cervical back: Normal range of motion and neck supple.   Lymphadenopathy:      Cervical: No cervical adenopathy.   Skin:     General: Skin is warm.   Neurological:      Mental Status: She is alert and oriented to person, place, and time.   Psychiatric:         Behavior: Behavior normal.         Vitals:    01/18/23 0948   BP: 110/70   Pulse: 73   Resp: 16   SpO2: 95%     Body mass index is 24.96 kg/m².      Medications:   Current Outpatient Medications:   •  Calcium-Magnesium-Vitamin D - MG-MG-UNIT tablet sustained-release 24 hour, Take  by mouth., Disp: , Rfl:   •  Calcium-Vitamin D-Vitamin K (Viactiv Calcium Plus D) 650-12.5-40 MG-MCG-MCG chewable tablet, Chew 1 each 2 (two) times a day., Disp: , Rfl:   •  levothyroxine (SYNTHROID, LEVOTHROID) 25 MCG tablet, TAKE 1 TABLET BY MOUTH  DAILY, Disp: 90 tablet, Rfl: 3  •  multivitamin with minerals tablet tablet, Take 1 tablet by mouth Daily., Disp: , Rfl:   •  omeprazole (priLOSEC) 40 MG capsule, Take 1 capsule by mouth Daily. (Patient taking  differently: Take 40 mg by mouth Daily. AS NEEDED), Disp: 30 capsule, Rfl: 5  •  rosuvastatin (CRESTOR) 20 MG tablet, TAKE 1 TABLET BY MOUTH  DAILY, Disp: 90 tablet, Rfl: 3  •  clobetasol (TEMOVATE) 0.05 % cream, Apply  topically to the appropriate area as directed Daily. As needed, Disp: , Rfl:   •  dicyclomine (Bentyl) 10 MG capsule, Take 1 capsule by mouth 4 (Four) Times a Day Before Meals & at Bedtime., Disp: 120 capsule, Rfl: 0  •  estradiol (ESTRACE) 0.1 MG/GM vaginal cream, ONLY USES ABOUT TWICE A WEEK, Disp: , Rfl:   •  guaiFENesin-codeine (GUAIFENESIN AC) 100-10 MG/5ML liquid, Take 5 mL by mouth 3 (Three) Times a Day As Needed for Cough., Disp: 150 mL, Rfl: 0  •  LORazepam (Ativan) 0.5 MG tablet, Take 1 tablet by mouth Every 8 (Eight) Hours As Needed for Anxiety., Disp: 10 tablet, Rfl: 0       Assessment & Plan   Healthy female exam.      1. Healthcare Maintenance:  2. Patient Counseling:  --Nutrition: Stressed importance of moderation in sodium/caffeine intake, saturated fat and cholesterol, caloric balance, sufficient intake of fresh fruits, vegetables, fiber, calcium and vit D  --Exercise: Recommended 30 minutes of exercise daily.  --Immunizations reviewed.  --Discussed benefits of screening colonoscopy.    Diagnoses and all orders for this visit:    Well woman exam (no gynecological exam)    Medicare annual wellness visit, subsequent    Healthcare maintenance  -     CBC & Differential  -     Comprehensive Metabolic Panel  -     Hemoglobin A1c  -     Thyroid Panel With TSH  -     Lipid Panel With LDL / HDL Ratio  -     Cancel: CBC & Differential  -     Cancel: Comprehensive Metabolic Panel  -     Cancel: Hemoglobin A1c  -     Cancel: Thyroid Panel With TSH  -     Cancel: Lipid Panel With LDL / HDL Ratio    Need for pneumococcal vaccination  -     Pneumococcal Conjugate Vaccine 20-Valent (PCV20)        No follow-ups on file.             Dictated utilizing Dragon Voice Recognition Software

## 2023-01-18 NOTE — PROGRESS NOTES
The ABCs of the Annual Wellness Visit  Subsequent Medicare Wellness Visit    Subjective      Penny Brown is a 72 y.o. female who presents for a Subsequent Medicare Wellness Visit.    The following portions of the patient's history were reviewed and   updated as appropriate: allergies, current medications, past family history, past medical history, past social history, past surgical history and problem list.    Compared to one year ago, the patient feels her physical   health is better.    Compared to one year ago, the patient feels her mental   health is better.    Recent Hospitalizations:  She was not admitted to the hospital during the last year.       Current Medical Providers:  Patient Care Team:  Julian Garg MD as PCP - General (Family Medicine)    Outpatient Medications Prior to Visit   Medication Sig Dispense Refill   • Calcium-Magnesium-Vitamin D - MG-MG-UNIT tablet sustained-release 24 hour Take  by mouth.     • Calcium-Vitamin D-Vitamin K (Viactiv Calcium Plus D) 650-12.5-40 MG-MCG-MCG chewable tablet Chew 1 each 2 (two) times a day.     • levothyroxine (SYNTHROID, LEVOTHROID) 25 MCG tablet TAKE 1 TABLET BY MOUTH  DAILY 90 tablet 3   • multivitamin with minerals tablet tablet Take 1 tablet by mouth Daily.     • omeprazole (priLOSEC) 40 MG capsule Take 1 capsule by mouth Daily. (Patient taking differently: Take 40 mg by mouth Daily. AS NEEDED) 30 capsule 5   • rosuvastatin (CRESTOR) 20 MG tablet TAKE 1 TABLET BY MOUTH  DAILY 90 tablet 3   • clobetasol (TEMOVATE) 0.05 % cream Apply  topically to the appropriate area as directed Daily. As needed     • dicyclomine (Bentyl) 10 MG capsule Take 1 capsule by mouth 4 (Four) Times a Day Before Meals & at Bedtime. 120 capsule 0   • estradiol (ESTRACE) 0.1 MG/GM vaginal cream ONLY USES ABOUT TWICE A WEEK     • guaiFENesin-codeine (GUAIFENESIN AC) 100-10 MG/5ML liquid Take 5 mL by mouth 3 (Three) Times a Day As Needed for Cough. 150 mL 0   •  "LORazepam (Ativan) 0.5 MG tablet Take 1 tablet by mouth Every 8 (Eight) Hours As Needed for Anxiety. 10 tablet 0     No facility-administered medications prior to visit.       No opioid medication identified on active medication list. I have reviewed chart for other potential  high risk medication/s and harmful drug interactions in the elderly.          Aspirin is not on active medication list.  Aspirin use is not indicated based on review of current medical condition/s. Risk of harm outweighs potential benefits.  .    Patient Active Problem List   Diagnosis   • Arcus senilis of both corneas   • Cystocele   • Epiphora due to insufficient drainage of right side   • Incomplete emptying of bladder   • OAB (overactive bladder)   • Senile cataracts of both eyes   • Uterovaginal prolapse, incomplete   • Hypothyroidism   • Mixed hyperlipidemia   • Prediabetes   • Leg cramps   • Vertigo   • Hypotension due to hypovolemia     Advance Care Planning  Advance Directive is not on file.  ACP discussion was held with the patient during this visit. Patient has an advance directive (not in EMR), copy requested.     Objective    Vitals:    01/18/23 0948   BP: 110/70   Pulse: 73   Resp: 16   SpO2: 95%   Weight: 63.9 kg (140 lb 14.4 oz)   Height: 160 cm (63\")   PainSc: 0-No pain     Estimated body mass index is 24.96 kg/m² as calculated from the following:    Height as of this encounter: 160 cm (63\").    Weight as of this encounter: 63.9 kg (140 lb 14.4 oz).    BMI is within normal parameters. No other follow-up for BMI required.      Does the patient have evidence of cognitive impairment?   No            HEALTH RISK ASSESSMENT    Smoking Status:  Social History     Tobacco Use   Smoking Status Never   Smokeless Tobacco Never     Alcohol Consumption:  Social History     Substance and Sexual Activity   Alcohol Use Yes   • Alcohol/week: 1.0 - 2.0 standard drink   • Types: 1 - 2 Shots of liquor per week    Comment: 2-4/MONTH     Fall Risk " Screen:    NADINEADI Fall Risk Assessment was completed, and patient is at LOW risk for falls.Assessment completed on:1/18/2023    Depression Screening:  PHQ-2/PHQ-9 Depression Screening 1/18/2023   Little Interest or Pleasure in Doing Things 0-->not at all   Feeling Down, Depressed or Hopeless 0-->not at all   PHQ-9: Brief Depression Severity Measure Score 0       Health Habits and Functional and Cognitive Screening:  Functional & Cognitive Status 1/18/2023   Do you have difficulty preparing food and eating? No   Do you have difficulty bathing yourself, getting dressed or grooming yourself? No   Do you have difficulty using the toilet? No   Do you have difficulty moving around from place to place? No   Do you have trouble with steps or getting out of a bed or a chair? No   Current Diet Well Balanced Diet   Dental Exam Up to date   Eye Exam Up to date   Exercise (times per week) 4 times per week   Current Exercises Include Walking   Current Exercise Activities Include -   Do you need help using the phone?  No   Are you deaf or do you have serious difficulty hearing?  No   Do you need help with transportation? No   Do you need help shopping? No   Do you need help preparing meals?  No   Do you need help with housework?  No   Do you need help with laundry? No   Do you need help taking your medications? No   Do you need help managing money? No   Do you ever drive or ride in a car without wearing a seat belt? No   Have you felt unusual stress, anger or loneliness in the last month? No   Who do you live with? Other   If you need help, do you have trouble finding someone available to you? -   Have you been bothered in the last four weeks by sexual problems? No   Do you have difficulty concentrating, remembering or making decisions? No       Age-appropriate Screening Schedule:  Refer to the list below for future screening recommendations based on patient's age, sex and/or medical conditions. Orders for these recommended tests  are listed in the plan section. The patient has been provided with a written plan.    Health Maintenance   Topic Date Due   • LIPID PANEL  01/04/2023   • PAP SMEAR  01/05/2023   • MAMMOGRAM  01/17/2024   • DXA SCAN  04/25/2024   • TDAP/TD VACCINES (3 - Td or Tdap) 08/27/2029   • ZOSTER VACCINE  Completed   • INFLUENZA VACCINE  Discontinued                CMS Preventative Services Quick Reference  Risk Factors Identified During Encounter:    None Identified    The above risks/problems have been discussed with the patient.  Pertinent information has been shared with the patient in the After Visit Summary.    Diagnoses and all orders for this visit:    1. Well woman exam (no gynecological exam) (Primary)    2. Medicare annual wellness visit, subsequent    3. Healthcare maintenance  -     CBC & Differential  -     Comprehensive Metabolic Panel  -     Hemoglobin A1c  -     Thyroid Panel With TSH  -     Lipid Panel With LDL / HDL Ratio  -     CBC & Differential  -     Comprehensive Metabolic Panel  -     Hemoglobin A1c  -     Thyroid Panel With TSH  -     Lipid Panel With LDL / HDL Ratio    4. Need for pneumococcal vaccination  -     Pneumococcal Conjugate Vaccine 20-Valent (PCV20)        Follow Up:   Next Medicare Wellness visit to be scheduled in 1 year.      An After Visit Summary and PPPS were made available to the patient.

## 2023-01-23 ENCOUNTER — TELEPHONE (OUTPATIENT)
Dept: INTERNAL MEDICINE | Facility: CLINIC | Age: 73
End: 2023-01-23

## 2023-01-26 ENCOUNTER — TELEPHONE (OUTPATIENT)
Dept: CARDIOLOGY | Facility: CLINIC | Age: 73
End: 2023-01-26
Payer: MEDICARE

## 2023-01-26 NOTE — TELEPHONE ENCOUNTER
I reviewed the note from the urgent care.  Lets move her appointment with a nurse practitioner from May 30 up to something in the next couple of weeks.  Call sooner if she has a reoccurrence of symptoms.

## 2023-01-26 NOTE — TELEPHONE ENCOUNTER
"----- Message from Liudmila Lynn MA sent at 1/25/2023  4:16 PM EST -----  Regarding: FW: Heart Episode  Contact: [phone]    ----- Message -----  From: Penny Brown  Sent: 1/25/2023   2:47 PM EST  To: Cristin christian [facility]  Subject: Heart Episode                                        You  2:40 PM  On Wed., Jan 25, 2023 in the middle of the night I awoke to go to the bathroom at 3am.  A few minutes after I got back into bed, I began to feel like I had a weight on my chest.  Not long after that I began to experience shortness of breath.  I was lying down at the time.  I sat up slowly and checked my pulse with a pulse oximeter.  By that time I was getting concerned and anxious.  My pulse was 115, which is high for me.  Next, I lay on my side and began taking deep breaths.  The heaviness and shortness gradually subsided.  At 9am that morning I went to [facility].  I was seen right away.  They did an ECG which they said was normal.  The doctor called it a \"heart episode\" and saw no reason to admit me to the hospital.   He was very thorough and asked me many questions.  But he did say I should let my cardiologist know.  I already have an appointment with Dian Gonzalez that was scheduled last year for this year, Tuesday, May 30th at 1:30PM.  I'm reporting this   information as he suggested.   Penny Brown    "

## 2023-01-26 NOTE — TELEPHONE ENCOUNTER
Please get patient in to see an APRN within the next couple of weeks per Dr Alexander.   Thank you so much  SHERI Nur  01/26/2023

## 2023-03-02 ENCOUNTER — OFFICE VISIT (OUTPATIENT)
Dept: CARDIOLOGY | Facility: CLINIC | Age: 73
End: 2023-03-02
Payer: MEDICARE

## 2023-03-02 VITALS
BODY MASS INDEX: 23.04 KG/M2 | HEIGHT: 63 IN | SYSTOLIC BLOOD PRESSURE: 130 MMHG | WEIGHT: 130 LBS | DIASTOLIC BLOOD PRESSURE: 88 MMHG | OXYGEN SATURATION: 98 % | HEART RATE: 98 BPM

## 2023-03-02 DIAGNOSIS — R00.2 PALPITATIONS: ICD-10-CM

## 2023-03-02 DIAGNOSIS — R07.89 CHEST PRESSURE: Primary | ICD-10-CM

## 2023-03-02 PROCEDURE — 99214 OFFICE O/P EST MOD 30 MIN: CPT | Performed by: NURSE PRACTITIONER

## 2023-03-02 NOTE — PROGRESS NOTES
"Date of Office Visit: 23  Encounter Provider: MARCIO Castillo  Place of Service: [facility]  Patient Name: Penny Brown  :1950    Chief Complaint   Patient presents with   • Follow-up   :     HPI: Penny Brown is a 72 y.o. female  with palpitations, hyperlipidemia, anxiety, GERD, hypothyroidism.        She is followed by Dr. Katherine Alexander. I will visit with her for the first time and have reviewed her medical record.     She had a normal 2 day holter monitor 2022.  Vascular screening at that same time showed bilateral carotid artery plaque without stenosis and was otherwise normal.  She presents today for reassessment.  In late January she had an episode at night where she had chest pressure and shortness of breath.  This lasted for about 10 minutes and her pulse was 115 on pulse oximeter.  She reportedly went to urgent care or immediate care and had an EKG and was discharged in stable condition.  She did not have associated dizziness or nausea.  She has not had a recurrent episode since.  About once a week she has an episode of her heart racing a little bit at night but again the episode in late January was the worst.  She has a half sister with atrial fibrillation reportedly.  She walks to stay active.  No near-syncope or syncope    Allergies   Allergen Reactions   • Other Hives and Rash     FROM A STEROID INJECTION MANY YEARS AGO             Family and social history reviewed.     ROS  All other systems were reviewed and are negative          Objective:     Vitals:    23 1524   BP: 130/88   BP Location: Left arm   Patient Position: Sitting   Cuff Size: Adult   Pulse: 98   SpO2: 98%   Weight: 59 kg (130 lb)   Height: 160 cm (63\")     Body mass index is 23.03 kg/m².    PHYSICAL EXAM:  Pulmonary:      Effort: Pulmonary effort is normal.   Cardiovascular:      Normal rate.         Procedures      Current Outpatient Medications   Medication Sig " Dispense Refill   • Calcium-Vitamin D-Vitamin K (Viactiv Calcium Plus D) 650-12.5-40 MG-MCG-MCG chewable tablet Chew 1 each 2 (two) times a day.     • clobetasol (TEMOVATE) 0.05 % cream Apply  topically to the appropriate area as directed Daily. As needed     • estradiol (ESTRACE) 0.1 MG/GM vaginal cream ONLY USES ABOUT TWICE A WEEK     • guaiFENesin-codeine (GUAIFENESIN AC) 100-10 MG/5ML liquid Take 5 mL by mouth 3 (Three) Times a Day As Needed for Cough. 150 mL 0   • levothyroxine (SYNTHROID, LEVOTHROID) 25 MCG tablet TAKE 1 TABLET BY MOUTH  DAILY 90 tablet 3   • LORazepam (Ativan) 0.5 MG tablet Take 1 tablet by mouth Every 8 (Eight) Hours As Needed for Anxiety. 10 tablet 0   • multivitamin with minerals tablet tablet Take 1 tablet by mouth Daily.     • omeprazole (priLOSEC) 40 MG capsule Take 1 capsule by mouth Daily. (Patient taking differently: Take 1 capsule by mouth Daily. AS NEEDED) 30 capsule 5   • rosuvastatin (CRESTOR) 20 MG tablet TAKE 1 TABLET BY MOUTH  DAILY 90 tablet 3     No current facility-administered medications for this visit.     Assessment:       Diagnosis Plan   1. Chest pressure  Stress Test With Myocardial Perfusion One Day    Holter Monitor - 72 Hour Up To 15 Days      2. Palpitations  Holter Monitor - 72 Hour Up To 15 Days           Orders Placed This Encounter   Procedures   • Stress Test With Myocardial Perfusion One Day     Standing Status:   Future     Standing Expiration Date:   3/1/2024     Order Specific Question:   What stress agent will be used?     Answer:   Exercise with possible pharmacologic     Order Specific Question:   Reason for exam?     Answer:   Chest Pain     Order Specific Question:   Release to patient     Answer:   Routine Release   • Holter Monitor - 72 Hour Up To 15 Days     Standing Status:   Future     Standing Expiration Date:   3/2/2024     Order Specific Question:   Reason for exam?     Answer:   Palpitations     Order Specific Question:   Release to patient      Answer:   Routine Release     Order Specific Question:   How many days is the patient to wear the monitor?     Answer:   14         Plan:       1.  72-year-old female with palpitations associated with chest pressure and shortness of breath.  She has not had a significant episode in over a month but she has mild episodes approximately once a week.  We will arrange for 2-week mobile telemetry  2.  Chest pressure-arrange for perfusion stress test, walking protocol  3.  Hypothyroidism replacement therapy  4.  Anxiety on therapy      Further recommendation pending the results of stress test and Zio patch            It has been a pleasure to participate in this patient's care.      Thank you,  MARCIO Castillo      **I used Dragon to dictate this note:**

## 2023-03-09 ENCOUNTER — OFFICE (AMBULATORY)
Dept: URBAN - METROPOLITAN AREA CLINIC 66 | Facility: CLINIC | Age: 73
End: 2023-03-09

## 2023-03-09 VITALS
WEIGHT: 137 LBS | SYSTOLIC BLOOD PRESSURE: 117 MMHG | HEART RATE: 75 BPM | HEIGHT: 63 IN | DIASTOLIC BLOOD PRESSURE: 76 MMHG

## 2023-03-09 DIAGNOSIS — K21.9 GASTRO-ESOPHAGEAL REFLUX DISEASE WITHOUT ESOPHAGITIS: ICD-10-CM

## 2023-03-09 DIAGNOSIS — R19.7 DIARRHEA, UNSPECIFIED: ICD-10-CM

## 2023-03-09 DIAGNOSIS — R13.10 DYSPHAGIA, UNSPECIFIED: ICD-10-CM

## 2023-03-09 DIAGNOSIS — K57.92 DIVERTICULITIS OF INTESTINE, PART UNSPECIFIED, WITHOUT PERFO: ICD-10-CM

## 2023-03-09 PROCEDURE — 99204 OFFICE O/P NEW MOD 45 MIN: CPT | Performed by: NURSE PRACTITIONER

## 2023-03-09 RX ORDER — CIPROFLOXACIN 500 MG/1
TABLET, FILM COATED ORAL
Qty: 6 | Refills: 0 | Status: ACTIVE
Start: 2023-03-09

## 2023-03-09 RX ORDER — METRONIDAZOLE 500 MG/1
TABLET ORAL
Qty: 6 | Refills: 0 | Status: ACTIVE
Start: 2023-03-09

## 2023-03-09 RX ORDER — OMEPRAZOLE 20 MG/1
20 CAPSULE, DELAYED RELEASE ORAL
Qty: 90 | Refills: 3 | Status: COMPLETED
Start: 2023-03-09 | End: 2023-06-05 | Stop reason: SINTOL

## 2023-03-16 ENCOUNTER — TELEPHONE (OUTPATIENT)
Dept: CARDIOLOGY | Facility: CLINIC | Age: 73
End: 2023-03-16
Payer: MEDICARE

## 2023-03-16 DIAGNOSIS — R00.2 PALPITATIONS: Primary | ICD-10-CM

## 2023-03-16 NOTE — TELEPHONE ENCOUNTER
----- Message from Ursula Izquierdo sent at 3/16/2023 11:50 AM EDT -----  Regarding: extended holter not covered  Hello,    Unfortunately, an extended holter is not covered with this plan. This plan would only cover a 24hr holter, 48hr holter, or 30 day event monitor.     Thank you,  Selene COULTER Pre-Cert

## 2023-03-30 ENCOUNTER — HOSPITAL ENCOUNTER (OUTPATIENT)
Dept: CARDIOLOGY | Facility: HOSPITAL | Age: 73
Discharge: HOME | End: 2023-03-30
Admitting: NURSE PRACTITIONER
Payer: MEDICARE

## 2023-03-30 ENCOUNTER — TELEPHONE (OUTPATIENT)
Dept: CARDIOLOGY | Facility: CLINIC | Age: 73
End: 2023-03-30
Payer: MEDICARE

## 2023-03-30 VITALS — WEIGHT: 132.28 LBS | HEIGHT: 63 IN | BODY MASS INDEX: 23.44 KG/M2

## 2023-03-30 DIAGNOSIS — R07.89 CHEST PRESSURE: ICD-10-CM

## 2023-03-30 LAB
BH CV NUCLEAR PRIOR STUDY: 2
BH CV REST NUCLEAR ISOTOPE DOSE: 10.5 MCI
BH CV STRESS BP STAGE 1: NORMAL
BH CV STRESS BP STAGE 2: NORMAL
BH CV STRESS DURATION MIN STAGE 1: 3
BH CV STRESS DURATION MIN STAGE 2: 3
BH CV STRESS DURATION SEC STAGE 1: 0
BH CV STRESS DURATION SEC STAGE 2: 0
BH CV STRESS GRADE STAGE 1: 10
BH CV STRESS GRADE STAGE 2: 12
BH CV STRESS HR STAGE 1: 104
BH CV STRESS HR STAGE 2: 130
BH CV STRESS METS STAGE 1: 5
BH CV STRESS METS STAGE 2: 7.5
BH CV STRESS NUCLEAR ISOTOPE DOSE: 35.7 MCI
BH CV STRESS PROTOCOL 1: NORMAL
BH CV STRESS RECOVERY BP: NORMAL MMHG
BH CV STRESS RECOVERY HR: 84 BPM
BH CV STRESS SPEED STAGE 1: 1.7
BH CV STRESS SPEED STAGE 2: 2.5
BH CV STRESS STAGE 1: 1
BH CV STRESS STAGE 2: 2
LV EF NUC BP: 67 %
MAXIMAL PREDICTED HEART RATE: 146 BPM
PERCENT MAX PREDICTED HR: 89.04 %
STRESS BASELINE BP: NORMAL MMHG
STRESS BASELINE HR: 73 BPM
STRESS PERCENT HR: 105 %
STRESS POST ESTIMATED WORKLOAD: 7.5 METS
STRESS POST EXERCISE DUR MIN: 6 MIN
STRESS POST EXERCISE DUR SEC: 0 SEC
STRESS POST PEAK BP: NORMAL MMHG
STRESS POST PEAK HR: 130 BPM
STRESS TARGET HR: 124 BPM

## 2023-03-30 PROCEDURE — 78452 HT MUSCLE IMAGE SPECT MULT: CPT

## 2023-03-30 PROCEDURE — 0 TECHNETIUM TETROFOSMIN KIT: Performed by: NURSE PRACTITIONER

## 2023-03-30 PROCEDURE — A9502 TC99M TETROFOSMIN: HCPCS | Performed by: NURSE PRACTITIONER

## 2023-03-30 PROCEDURE — 93017 CV STRESS TEST TRACING ONLY: CPT

## 2023-03-30 RX ADMIN — TETROFOSMIN 1 DOSE: 1.38 INJECTION, POWDER, LYOPHILIZED, FOR SOLUTION INTRAVENOUS at 12:06

## 2023-03-30 NOTE — TELEPHONE ENCOUNTER
Left VM of results/recommendations and to call back with any further questions or concerns, allowed by verbal release form.     Karrie Bradley RN  Triage [facility]

## 2023-04-08 DIAGNOSIS — E07.9 THYROID DISEASE: ICD-10-CM

## 2023-04-08 DIAGNOSIS — E78.5 HYPERLIPIDEMIA, UNSPECIFIED HYPERLIPIDEMIA TYPE: ICD-10-CM

## 2023-04-12 RX ORDER — ROSUVASTATIN CALCIUM 20 MG/1
20 TABLET, COATED ORAL DAILY
Qty: 90 TABLET | Refills: 3 | Status: SHIPPED | OUTPATIENT
Start: 2023-04-12

## 2023-04-12 RX ORDER — LEVOTHYROXINE SODIUM 0.03 MG/1
25 TABLET ORAL DAILY
Qty: 90 TABLET | Refills: 3 | Status: SHIPPED | OUTPATIENT
Start: 2023-04-12

## 2023-05-01 ENCOUNTER — TELEPHONE (OUTPATIENT)
Dept: CARDIOLOGY | Facility: CLINIC | Age: 73
End: 2023-05-01
Payer: MEDICARE

## 2023-05-01 NOTE — TELEPHONE ENCOUNTER
Notified patient of results/recommendations. Patient verbalized understanding. Her appointment that was originally scheduled for May 30th was cancelled. She is wondering if she still needs to come back this month since everything checked out ok?    Karrie Bradley RN  Triage [facility]

## 2023-05-01 NOTE — TELEPHONE ENCOUNTER
Called and left VM. Will continue to try to reach patient.     Karrie Bradley RN  Triage [facility]

## 2023-05-01 NOTE — TELEPHONE ENCOUNTER
Please inform patient her monitor showed no significant change in heart rhythm. She has brief racing spells but nothing that warrants medication at this time. Keep appt end of this month to follow up

## 2023-07-01 PROBLEM — I48.91 NEW ONSET ATRIAL FIBRILLATION: Status: ACTIVE | Noted: 2023-07-01

## 2023-07-03 ENCOUNTER — TELEPHONE (OUTPATIENT)
Dept: CARDIOLOGY | Facility: CLINIC | Age: 73
End: 2023-07-03

## 2023-07-03 NOTE — TELEPHONE ENCOUNTER
Caller: Penny Brown    Relationship to patient: Self    Best call back number: [phone]    Patient is needing: PATIENT WAS RETURNING A CALL TO [facility] IN REGARDS TO AN APPOINTMENT. PLEASE CALL PATIENT BACK AT THE EARLIEST CONVENIENCE.

## 2023-07-06 ENCOUNTER — TELEPHONE (OUTPATIENT)
Dept: INTERNAL MEDICINE | Facility: CLINIC | Age: 73
End: 2023-07-06

## 2023-07-06 PROBLEM — F41.9 ANXIETY: Status: ACTIVE | Noted: 2023-07-06

## 2023-07-06 NOTE — TELEPHONE ENCOUNTER
Caller: Penny Brown    Relationship to patient: Self    Best call back number: [phone]    Chief complaint: [facility] E/R 07/01/2023 ; A FIB     Type of visit: HOSPITAL FOLLOW UP     Requested date: AS SOON AS POSSIBLE    Additional notes: PATIENT IS REQUESTING A CALL BACK TO SCHEDULE APPOINTMENT.     [facility] ATTEMPTED TO SCHEDULE BUT DR. GONZALEZ DOES NOT HAVE A HOSPITAL FOLLOW UP APPOINTMENT UNTIL 07/31/2023.

## 2023-07-31 ENCOUNTER — OFFICE VISIT (OUTPATIENT)
Dept: INTERNAL MEDICINE | Facility: CLINIC | Age: 73
End: 2023-07-31
Payer: MEDICARE

## 2023-07-31 VITALS
HEIGHT: 63 IN | SYSTOLIC BLOOD PRESSURE: 110 MMHG | OXYGEN SATURATION: 97 % | RESPIRATION RATE: 16 BRPM | HEART RATE: 65 BPM | WEIGHT: 133 LBS | DIASTOLIC BLOOD PRESSURE: 66 MMHG | BODY MASS INDEX: 23.57 KG/M2

## 2023-07-31 DIAGNOSIS — R51.9 INTRACTABLE HEADACHE, UNSPECIFIED CHRONICITY PATTERN, UNSPECIFIED HEADACHE TYPE: ICD-10-CM

## 2023-07-31 DIAGNOSIS — I48.91 ATRIAL FIBRILLATION, UNSPECIFIED TYPE: ICD-10-CM

## 2023-07-31 DIAGNOSIS — E03.9 HYPOTHYROIDISM, UNSPECIFIED TYPE: ICD-10-CM

## 2023-07-31 DIAGNOSIS — R00.2 HEART PALPITATIONS: Primary | ICD-10-CM

## 2023-07-31 DIAGNOSIS — F41.9 ANXIETY: ICD-10-CM

## 2023-07-31 RX ORDER — METOPROLOL SUCCINATE 25 MG/1
TABLET, EXTENDED RELEASE ORAL
COMMUNITY
Start: 2023-07-10 | End: 2023-08-01

## 2023-07-31 RX ORDER — LEVOTHYROXINE SODIUM 0.05 MG/1
50 TABLET ORAL DAILY
Qty: 90 TABLET | Refills: 2 | Status: SHIPPED | OUTPATIENT
Start: 2023-07-31

## 2023-07-31 NOTE — PROGRESS NOTES
"Transitional Care Follow Up Visit  Subjective     Penny Brown is a 72 y.o. female who presents for a transitional care management visit.    Within 48 business hours after discharge our office contacted her via telephone to coordinate her care and needs.      I reviewed and discussed the details of that call along with the discharge summary, hospital problems, inpatient lab results, inpatient diagnostic studies, and consultation reports with Penny.     Current outpatient and discharge medications have been reconciled for the patient.        7/1/2023     2:41 PM 7/1/2023     2:51 PM   Date of TCM Phone Call   [facility]   Date of Admission 7/1/2023 7/1/2023   Date of Discharge 7/1/2023 7/1/2023   Discharge Disposition Home or Self Care Home or Self Care     Risk for Readmission (LACE)   No data recorded    History of Present Illness   Course During Hospital Stay:           \"Context: Penny Brown is a 72 y.o. female who presents to the ED via [facility] from home for cute onset of palpitations and lightheadedness that started earlier this evening.  Has been having off-and-on issues for several months, even went on with a cardiac monitor for a month back in May that showed PACs and some occasional SVT but no A-fib.  Denies any chest pain or shortness of breath, no vomiting or diarrhea.  Currently just feels \"weird\".  Apple watch at home noted A-fib.  No history of diagnosed A-fib and not on any anticoagulation at this time.        MEDICAL RECORD REVIEW     External (non-ED) record review: Stress test with myocardial perfusion March 30, 2023 showed ejection fraction of 67% with impressions consistent with a low risk study.  Event monitor result from May 1, 2023 showed varying heart rates from 48 up to 141, occasional episodes of PACs and PVCs, couple episodes of short bursts of SVT.\"    While patient was discharged from the hospital, she was started on " Eliquis 5 mg twice a day.  She was also started on diltiazem 120 mg daily.  Both these medications were new for the patient.  Patient states that the next few days was very difficult for her, but noted that the medications were working.    Patient states that she has been feeling well after the initial few days from coming from the hospital, however since then she was doing very well until this morning.  This morning she woke up with a significant headache.  Patient states that her blood pressure was elevated at 153/73.  She states that she took couple Tylenols.  I seem to help her with her headache.  She states that she did not take her blood pressure medicine for another couple hours, and then her blood pressure started to come back down to normal.     The following portions of the patient's history were reviewed and updated as appropriate: allergies, current medications, past family history, past medical history, past social history, past surgical history, and problem list.    Current outpatient and discharge medications have been reconciled for the patient.  Reviewed by: Julian Garg MD      Review of Systems    Objective   Physical Exam  Vitals and nursing note reviewed.   Constitutional:       Appearance: She is well-developed.   HENT:      Head: Normocephalic and atraumatic.   Musculoskeletal:      Cervical back: Normal range of motion and neck supple.   Neurological:      Mental Status: She is alert and oriented to person, place, and time.   Psychiatric:         Behavior: Behavior normal.       Assessment & Plan   Diagnoses and all orders for this visit:    1. Heart palpitations (Primary)  -     Comprehensive Metabolic Panel  -     CBC & Differential  -     Thyroid Panel With TSH  -     Magnesium    2. Intractable headache, unspecified chronicity pattern, unspecified headache type    3. Atrial fibrillation, unspecified type  -     Thyroid Panel With TSH    4. Hypothyroidism, unspecified type  -      levothyroxine (Synthroid) 50 MCG tablet; Take 1 tablet by mouth Daily.  Dispense: 90 tablet; Refill: 2  -     Thyroid Panel With TSH    5. Anxiety      Patient anxiety is currently stable.  For heart palpitations will order several labs at today's visit.  For hypothyroidism continue Synthroid 50 mcg daily.  We will check thyroid panel today's visit.         Dictated utilizing Dragon Voice Recognition Software

## 2023-08-01 ENCOUNTER — OFFICE VISIT (OUTPATIENT)
Dept: CARDIOLOGY | Facility: CLINIC | Age: 73
End: 2023-08-01
Payer: MEDICARE

## 2023-08-01 VITALS
HEART RATE: 58 BPM | DIASTOLIC BLOOD PRESSURE: 84 MMHG | SYSTOLIC BLOOD PRESSURE: 132 MMHG | WEIGHT: 135 LBS | HEIGHT: 63 IN | BODY MASS INDEX: 23.92 KG/M2

## 2023-08-01 DIAGNOSIS — I48.91 NEW ONSET ATRIAL FIBRILLATION: Primary | ICD-10-CM

## 2023-08-01 LAB
ALBUMIN SERPL-MCNC: 4.4 G/DL (ref 3.8–4.8)
ALBUMIN/GLOB SERPL: 1.8 {RATIO} (ref 1.2–2.2)
ALP SERPL-CCNC: 62 IU/L (ref 44–121)
ALT SERPL-CCNC: 21 IU/L (ref 0–32)
AST SERPL-CCNC: 35 IU/L (ref 0–40)
BASOPHILS # BLD AUTO: 0 X10E3/UL (ref 0–0.2)
BASOPHILS NFR BLD AUTO: 0 %
BILIRUB SERPL-MCNC: 0.5 MG/DL (ref 0–1.2)
BUN SERPL-MCNC: 12 MG/DL (ref 8–27)
BUN/CREAT SERPL: 14 (ref 12–28)
CALCIUM SERPL-MCNC: 8.9 MG/DL (ref 8.7–10.3)
CHLORIDE SERPL-SCNC: 105 MMOL/L (ref 96–106)
CO2 SERPL-SCNC: 26 MMOL/L (ref 20–29)
CREAT SERPL-MCNC: 0.86 MG/DL (ref 0.57–1)
EGFRCR SERPLBLD CKD-EPI 2021: 71 ML/MIN/1.73
EOSINOPHIL # BLD AUTO: 0.1 X10E3/UL (ref 0–0.4)
EOSINOPHIL NFR BLD AUTO: 2 %
ERYTHROCYTE [DISTWIDTH] IN BLOOD BY AUTOMATED COUNT: 12.5 % (ref 11.7–15.4)
FT4I SERPL CALC-MCNC: 2.4 (ref 1.2–4.9)
GLOBULIN SER CALC-MCNC: 2.5 G/DL (ref 1.5–4.5)
GLUCOSE SERPL-MCNC: 83 MG/DL (ref 70–99)
HCT VFR BLD AUTO: 40.3 % (ref 34–46.6)
HGB BLD-MCNC: 13.5 G/DL (ref 11.1–15.9)
IMM GRANULOCYTES # BLD AUTO: 0 X10E3/UL (ref 0–0.1)
IMM GRANULOCYTES NFR BLD AUTO: 0 %
LYMPHOCYTES # BLD AUTO: 1.4 X10E3/UL (ref 0.7–3.1)
LYMPHOCYTES NFR BLD AUTO: 32 %
MAGNESIUM SERPL-MCNC: 2.3 MG/DL (ref 1.6–2.3)
MCH RBC QN AUTO: 31.5 PG (ref 26.6–33)
MCHC RBC AUTO-ENTMCNC: 33.5 G/DL (ref 31.5–35.7)
MCV RBC AUTO: 94 FL (ref 79–97)
MONOCYTES # BLD AUTO: 0.4 X10E3/UL (ref 0.1–0.9)
MONOCYTES NFR BLD AUTO: 10 %
NEUTROPHILS # BLD AUTO: 2.5 X10E3/UL (ref 1.4–7)
NEUTROPHILS NFR BLD AUTO: 56 %
PLATELET # BLD AUTO: 187 X10E3/UL (ref 150–450)
POTASSIUM SERPL-SCNC: 4 MMOL/L (ref 3.5–5.2)
PROT SERPL-MCNC: 6.9 G/DL (ref 6–8.5)
RBC # BLD AUTO: 4.28 X10E6/UL (ref 3.77–5.28)
SODIUM SERPL-SCNC: 143 MMOL/L (ref 134–144)
T3RU NFR SERPL: 25 % (ref 24–39)
T4 SERPL-MCNC: 9.4 UG/DL (ref 4.5–12)
TSH SERPL DL<=0.005 MIU/L-ACNC: 2.3 UIU/ML (ref 0.45–4.5)
WBC # BLD AUTO: 4.4 X10E3/UL (ref 3.4–10.8)

## 2023-08-01 PROCEDURE — 93000 ELECTROCARDIOGRAM COMPLETE: CPT

## 2023-08-01 PROCEDURE — 99214 OFFICE O/P EST MOD 30 MIN: CPT

## 2023-08-08 ENCOUNTER — TELEPHONE (OUTPATIENT)
Dept: INTERNAL MEDICINE | Facility: CLINIC | Age: 73
End: 2023-08-08

## 2023-08-08 DIAGNOSIS — E03.9 HYPOTHYROIDISM, UNSPECIFIED TYPE: ICD-10-CM

## 2023-08-08 NOTE — TELEPHONE ENCOUNTER
Caller: Penny Brown    Relationship: Self    Best call back number: [phone]    Requested Prescriptions:   Requested Prescriptions     Pending Prescriptions Disp Refills    levothyroxine (Synthroid) 50 MCG tablet 90 tablet 2     Sig: Take 1 tablet by mouth Daily.        Pharmacy where request should be sent: [id number] - [place], [place] - [address] [facility] [address] [address] - [phone]  - [phone] FX     Last office visit with prescribing clinician: 7/31/2023   Last telemedicine visit with prescribing clinician: Visit date not found   Next office visit with prescribing clinician: 9/1/2023     Additional details provided by patient: PATIENT STATES THAT SHE USED TO TAKE 25 MG UNTIL SHE WAS IN THE HOSPITAL. PATIENT STATES THAT HER THYROID LEVELS ARE BACK TO NORMAL, AND SHE WOULD LIKE TO GO BACK TO HER PREVIOUS DOSAGE. PATIENT HAS A WEEK AND A HALF LEFT OF HER MEDICATION.     Does the patient have less than a 3 day supply:  [] Yes  [x] No    Would you like a call back once the refill request has been completed: [] Yes [x] No    If the office needs to give you a call back, can they leave a voicemail: [] Yes [x] No    Felix Tillman   08/08/23 14:28 EDT

## 2023-08-08 NOTE — TELEPHONE ENCOUNTER
Caller: Penny Brown    Relationship: Self    Best call back number: [phone]    What was the call regarding: PATIENT STATES THAT SHE SEES A MASSAGE THERAPIST NAMED KARRIE BOSTON. SHE STATES THAT SHE HAS MASSAGES FOR ANXIETY. PATIENT STATES THAT SHE SPOKE WITH HER MASSAGE THERAPIST, AND THEY STATED THAT IF DR. GONZALEZ WRITES THE PATIENT A NOTE STATING THAT SHE IS RECEIVING THESE MASSAGES FOR MEDICAL PURPOSES, SHE CAN RECEIVE A DISCOUNTED MASSAGE.

## 2023-08-09 RX ORDER — LEVOTHYROXINE SODIUM 0.05 MG/1
50 TABLET ORAL DAILY
Qty: 90 TABLET | Refills: 2 | OUTPATIENT
Start: 2023-08-09

## 2023-08-11 ENCOUNTER — TELEPHONE (OUTPATIENT)
Dept: INTERNAL MEDICINE | Facility: CLINIC | Age: 73
End: 2023-08-11

## 2023-08-11 ENCOUNTER — TELEPHONE (OUTPATIENT)
Dept: SLEEP MEDICINE | Facility: HOSPITAL | Age: 73
End: 2023-08-11
Payer: MEDICARE

## 2023-08-11 DIAGNOSIS — G47.33 OBSTRUCTIVE SLEEP APNEA (ADULT) (PEDIATRIC): Primary | ICD-10-CM

## 2023-08-11 NOTE — TELEPHONE ENCOUNTER
Caller: Penny Brown    Relationship: Self    Best call back number: [phone]    Have you had these symptoms before:    [x] Yes  [] No    Have you been treated for these symptoms before:   [x] Yes  [] No    Additional notes: PATIENT IS REQUESTING TO HAVE THE 25 MCG INSTEAD OF THE 50 MCG.    ARE THE TABLETS OF THE 50 MCG CAN BE CUT IN HALF AND ONLY TAKE 1/2 THE TABLET.    PATIENT STATED HER CURRENT DOSAGE OF 25 MCG IS WHAT SHE HAS BEEN TAKING PRIOR TO GOING TO HOSPITAL AS WELL AS BEING RELEASED.  PATIENT STATED HER BLOOD WORK IS THE SAME NOW AS WHAT IT WAS PRIOR TO THE HOSPITAL.  PATIENT WOULD PREFER TO NOT TAKE TOO MUCH AND THAT IS WHY SHE IS REQUESTING TO STAY ON 25 MCG.    PATIENT HAS NEVER TAKEN THE HIGHER DOSE OF 50 MGC AND THAT IS HER BIGGEST CONCERN THAT SHE DOESN'T REALLY NEED THE HIGHER DOSE.    IF THIS IS NOT POSSIBLE TO CUT THE TABLET IN HALF THEN PATIENT WOULD NEED A NEW SCRIPT SENT FOR TH 25MCG TO     [facility] ([facility]) - [address] [phone] [facility] [phone]  [phone]     PATIENT PLANS TO FINISH HER CURRENT DOSAGE OF 25 MCG (7 LEFT).  PLEASE ADVISE

## 2023-08-11 NOTE — TELEPHONE ENCOUNTER
Pressure changed to fixed at 8cm per Dr. Padilla.  Changed sent via modem and patient was notified.

## 2023-08-11 NOTE — TELEPHONE ENCOUNTER
PATIENT REQUESTING A NOTE FOR THE MASSAGES FOR MEDICAL NECESSITY.  PATIENT STATED THAT THE MASSAGE THERAPIST IS OFFERING A DISCOUNT IF SHE HAS A NOTE FROM HER PCP.    PATIENT STATED THAT THESE MASSAGES HELP RELIEVE HER STRESS AND HAVE BEEN HELPING A GREAT DEAL.   normal... Well appearing, awake, alert, oriented to person, place, time/situation and in no apparent distress.

## 2023-08-14 ENCOUNTER — TELEPHONE (OUTPATIENT)
Dept: SLEEP MEDICINE | Facility: HOSPITAL | Age: 73
End: 2023-08-14
Payer: MEDICARE

## 2023-08-14 NOTE — TELEPHONE ENCOUNTER
Patient called stating her pressure never changed , reached out to [employer] to see why pts modem transmitting information . They will reach out to pt for trouble shooting

## 2023-08-15 DIAGNOSIS — I48.91 NEW ONSET ATRIAL FIBRILLATION: ICD-10-CM

## 2023-08-19 RX ORDER — LEVOTHYROXINE SODIUM 0.03 MG/1
25 TABLET ORAL
Qty: 90 TABLET | Refills: 1 | Status: SHIPPED | OUTPATIENT
Start: 2023-08-19

## 2023-08-28 ENCOUNTER — TELEPHONE (OUTPATIENT)
Dept: SLEEP MEDICINE | Facility: HOSPITAL | Age: 73
End: 2023-08-28
Payer: MEDICARE

## 2023-09-01 ENCOUNTER — OFFICE VISIT (OUTPATIENT)
Dept: INTERNAL MEDICINE | Facility: CLINIC | Age: 73
End: 2023-09-01
Payer: MEDICARE

## 2023-09-01 VITALS
BODY MASS INDEX: 24.01 KG/M2 | WEIGHT: 135.5 LBS | HEART RATE: 64 BPM | SYSTOLIC BLOOD PRESSURE: 98 MMHG | RESPIRATION RATE: 16 BRPM | DIASTOLIC BLOOD PRESSURE: 62 MMHG | OXYGEN SATURATION: 99 % | HEIGHT: 63 IN

## 2023-09-01 DIAGNOSIS — R00.2 HEART PALPITATIONS: ICD-10-CM

## 2023-09-01 DIAGNOSIS — I48.91 ATRIAL FIBRILLATION, UNSPECIFIED TYPE: Primary | ICD-10-CM

## 2023-09-01 DIAGNOSIS — E03.9 HYPOTHYROIDISM, UNSPECIFIED TYPE: ICD-10-CM

## 2023-09-01 PROCEDURE — 1160F RVW MEDS BY RX/DR IN RCRD: CPT | Performed by: FAMILY MEDICINE

## 2023-09-01 PROCEDURE — 1159F MED LIST DOCD IN RCRD: CPT | Performed by: FAMILY MEDICINE

## 2023-09-01 PROCEDURE — 99214 OFFICE O/P EST MOD 30 MIN: CPT | Performed by: FAMILY MEDICINE

## 2023-09-01 NOTE — PROGRESS NOTES
"Chief Complaint  Palpitations    Subjective        Penny Brown presents to [facility]  History of Present Illness      Penny Brown is a 72-year-old female who presents today to discuss palpitations.    The patient was diagnosed with atrial fibrillation on 07/01/2023. She has had 1 more atrial fibrillation episode since then, which happened a few weeks ago. She managed it with diltiazem that was prescribed by her cardiologist. She reports that she only takes ditiazem when she has a atrial fibrillation. The episode lasted 5 hours because she was awake for 5 hours and she could feel it. The episode lasted from 11:30 PM to 4:30 AM. She denies drinking a lot of coffee or doing anything to stimulate her heart.     She has started having elevated blood pressure since she developed atrial fibrillation. The patient reports that she has no atrial fibrillation when her blood pressure elevates. The patient reprots that she would calm down and do cleansing breaths for reliefe. She reports that her systolic is normally 125 mmHg.  Discussed with the patient when to take dilitizem for elevated blood pressure. She is currently taking Eliquis twice daily.    She was prescribed lorazepam in 2022. She still has 6 of them left. She took 1 when she went into atrial fibrillation.     She has started using a CPAP machine. She is still adjusting to the CPAP machine. The patient reprots that when she first started her air was adjusted to high and she had experienced ructus.     She recieved her COVID-19 vaccine in 2021. The patient is inquiring about getting the new vaccine.     Objective   Vital Signs:  BP 98/62 (BP Location: Left arm, Patient Position: Sitting, Cuff Size: Adult)   Pulse 64   Resp 16   Ht 160 cm (63\")   Wt 61.5 kg (135 lb 8 oz)   SpO2 99%   BMI 24.00 kg/mý   Estimated body mass index is 24 kg/mý as calculated from the following:    Height as of this encounter: 160 cm (63\").    Weight " as of this encounter: 61.5 kg (135 lb 8 oz).       BMI is within normal parameters. No other follow-up for BMI required.      Physical Exam   Result Review :          A review of systems was performed, and the pertinent positives are noted in the HPI.           Assessment and Plan   Diagnoses and all orders for this visit:    1. Atrial fibrillation, unspecified type (Primary)    2. Hypothyroidism, unspecified type    3. Heart palpitations    1. Palpitations  - She will continue taking Eliquis twice daily.    2. Hypertension  - She will continue taking diltiazem as needed.    3. Sleep apnea  - She will continue using the CPAP machine.    4. Health maintenance  - She will receive her COVID-19 vaccine.         Follow Up   No follow-ups on file.  Patient was given instructions and counseling regarding her condition or for health maintenance advice. Please see specific information pulled into the AVS if appropriate.       Transcribed from ambient dictation for Julian Garg MD by Sarah Reis.  09/01/23   11:34 EDT    Patient or patient representative verbalized consent to the visit recording.  I have personally performed the services described in this document as transcribed by the above individual, and it is both accurate and complete.

## 2023-09-06 ENCOUNTER — OFFICE VISIT (OUTPATIENT)
Dept: CARDIOLOGY | Facility: CLINIC | Age: 73
End: 2023-09-06
Payer: MEDICARE

## 2023-09-06 VITALS
RESPIRATION RATE: 16 BRPM | HEART RATE: 64 BPM | BODY MASS INDEX: 23.74 KG/M2 | HEIGHT: 63 IN | OXYGEN SATURATION: 99 % | SYSTOLIC BLOOD PRESSURE: 98 MMHG | WEIGHT: 134 LBS | DIASTOLIC BLOOD PRESSURE: 62 MMHG

## 2023-09-06 DIAGNOSIS — I48.0 PAROXYSMAL ATRIAL FIBRILLATION: Primary | ICD-10-CM

## 2023-09-06 PROBLEM — I48.91 NEW ONSET ATRIAL FIBRILLATION: Status: RESOLVED | Noted: 2023-07-01 | Resolved: 2023-09-06

## 2023-09-06 PROCEDURE — 93000 ELECTROCARDIOGRAM COMPLETE: CPT | Performed by: INTERNAL MEDICINE

## 2023-09-06 PROCEDURE — 1159F MED LIST DOCD IN RCRD: CPT | Performed by: INTERNAL MEDICINE

## 2023-09-06 PROCEDURE — 1160F RVW MEDS BY RX/DR IN RCRD: CPT | Performed by: INTERNAL MEDICINE

## 2023-09-06 PROCEDURE — 99214 OFFICE O/P EST MOD 30 MIN: CPT | Performed by: INTERNAL MEDICINE

## 2023-09-06 NOTE — PROGRESS NOTES
CARDIOLOGY    Katherine Alexander MD    ENCOUNTER DATE:  09/06/2023    Penny Brown / 72 y.o. / female        CHIEF COMPLAINT / REASON FOR OFFICE VISIT     Heart Problem (3-6 month follow up)      HISTORY OF PRESENT ILLNESS       HPI    Penny Brown is a 72 y.o. female     This is a lady I saw in the past with some palpitations.  Vascular screening in 05/2022 showed plaque without stenosis bilaterally.  No abdominal aortic aneurysm or peripheral arterial disease.  A 48-hour monitor in 05/2022 was normal.  Nuclear stress test 03/2023 was normal.  Event monitor 05/2023 was normal and symptoms correlated to sinus rhythm or short bursts of SVT.  In 07/2023 she had tachycardia, palpitations, and dyspnea, and was diagnosed with atrial fibrillation with rapid ventricular response.  She converted on her own after about 5 hours.  She follows with MARCIO Fay, and Dr. Abbott.  They have discussed the possibility of antiarrhythmics or ablation if her symptoms worsen.    She said since she last saw Ruben, she had 1 episodes of palpitations but it went away on its own.  She has diltiazem to take as needed.  She is anticoagulated with Eliquis and does not have any bleeding issues.          REVIEW OF SYSTEMS     Review of Systems   Constitutional: Negative for chills, fever, weight gain and weight loss.   Cardiovascular:  Negative for leg swelling.   Respiratory:  Negative for cough, snoring and wheezing.    Hematologic/Lymphatic: Negative for bleeding problem. Does not bruise/bleed easily.   Skin:  Negative for color change.   Musculoskeletal:  Negative for falls, joint pain and myalgias.   Gastrointestinal:  Negative for melena.   Genitourinary:  Negative for hematuria.   Neurological:  Negative for excessive daytime sleepiness.   Psychiatric/Behavioral:  Negative for depression. The patient is not nervous/anxious.        VITAL SIGNS     Visit Vitals  BP 98/62 (BP Location: Left arm, Patient Position: Sitting, Cuff  "Size: Adult)   Pulse 64   Resp 16   Ht 160 cm (63\")   Wt 60.8 kg (134 lb)   SpO2 99%   BMI 23.74 kg/m²         Wt Readings from Last 3 Encounters:   09/06/23 60.8 kg (134 lb)   09/01/23 61.5 kg (135 lb 8 oz)   08/01/23 61.2 kg (135 lb)     Body mass index is 23.74 kg/m².      PHYSICAL EXAMINATION     Constitutional:       General: Not in acute distress.  Neck:      Vascular: No carotid bruit or JVD.   Pulmonary:      Effort: Pulmonary effort is normal.      Breath sounds: Normal breath sounds.   Cardiovascular:      Normal rate. Regular rhythm.      Murmurs: There is no murmur.   Psychiatric:         Mood and Affect: Mood and affect normal.         REVIEWED DATA       ECG 12 Lead    Date/Time: 9/6/2023 11:49 AM  Performed by: Katherine Alexander MD  Authorized by: Katherine Alexander MD   Comparison: compared with previous ECG from 8/1/2023  Similar to previous ECG  Rhythm: sinus rhythm  BPM: 64  Conduction: conduction normal  ST Segments: ST segments normal  T Waves: T waves normal    Clinical impression: normal ECG          Lipid Panel          1/18/2023    10:55   Lipid Panel   Total Cholesterol 153    Triglycerides 91    HDL Cholesterol 56    VLDL Cholesterol 17    LDL Cholesterol  80    LDL/HDL Ratio 1.41        Lab Results   Component Value Date    GLUCOSE 83 07/31/2023    BUN 12 07/31/2023    CREATININE 0.86 07/31/2023    EGFRRESULT 71 07/31/2023    EGFR 89.4 07/01/2023    BCR 14 07/31/2023    K 4.0 07/31/2023    CO2 26 07/31/2023    CALCIUM 8.9 07/31/2023    PROTENTOTREF 6.9 07/31/2023    ALBUMIN 4.4 07/31/2023    BILITOT 0.5 07/31/2023    AST 35 07/31/2023    ALT 21 07/31/2023       ASSESSMENT & PLAN      Diagnosis Plan   1. Paroxysmal atrial fibrillation            Paroxysmal atrial fibrillation.  She is anticoagulated with Eliquis.  She has no bleeding side effects.  She has diltiazem to take as needed.  I told her to take 2 if her heart is over 100, and 1 if her heart rate is less than 100 but she is " symptomatic.  She has relatively low baseline blood pressure.  She has a followup appointment scheduled with Dr. Abbott in 02/2024 where they can discuss antiarrhythmics or ablation if she is having persistent symptoms.    Hypothyroid, on replacement.  Hyperlipidemia, on rosuvastatin.  I reviewed her most recent lipid panel as above.      I am going to have her followup with Dian next year.  She already has followup with Dr. Abbott in 02/2024.  I told her to call me if she is having any worsening of her symptoms.              No orders of the defined types were placed in this encounter.          MEDICATIONS         Discharge Medications            Accurate as of September 6, 2023 11:45 AM. If you have any questions, ask your nurse or doctor.                Changes to Medications        Instructions Start Date   LORazepam 0.5 MG tablet  Commonly known as: Ativan  What changed: additional instructions   0.5 mg, Oral, Every 8 Hours PRN             Continue These Medications        Instructions Start Date   apixaban 5 MG tablet tablet  Commonly known as: ELIQUIS   5 mg, Oral, Every 12 Hours Scheduled      clobetasol 0.05 % cream  Commonly known as: TEMOVATE   Topical, Daily, As needed      dilTIAZem 30 MG tablet  Commonly known as: CARDIZEM   TAKE 2 TABLETS BY MOUTH TWICE  DAILY AS NEEDED FOR AFIB,  TACHYCARDIA, OR HEART RATE  GREATER THAN 100      estradiol 0.1 MG/GM vaginal cream  Commonly known as: ESTRACE   As Needed. Uses as needed      levothyroxine 25 MCG tablet  Commonly known as: SYNTHROID, LEVOTHROID   25 mcg, Oral, Every Early Morning      multivitamin with minerals tablet tablet   1 tablet, Oral, Daily      omeprazole 20 MG capsule  Commonly known as: priLOSEC   20 mg, Oral, Daily PRN      rosuvastatin 20 MG tablet  Commonly known as: CRESTOR   20 mg, Oral, Daily      Viactiv Calcium Plus D 650-12.5-40 MG-MCG-MCG chewable tablet  Generic drug: Calcium-Vitamin D-Vitamin K   1 each, Oral, 2 times daily                  Katherine Alexander MD  09/06/23  11:45 EDT    Part of this note may be an electronic transcription/translation of spoken language to printed text using the Dragon dictation system.

## 2023-09-29 ENCOUNTER — TELEPHONE (OUTPATIENT)
Dept: INTERNAL MEDICINE | Facility: CLINIC | Age: 73
End: 2023-09-29
Payer: MEDICARE

## 2023-09-29 NOTE — TELEPHONE ENCOUNTER
Called pt and advised her to go to an UC if her symptoms worsened. But to take over the counter cough medication and a decongestant as tolerated. Pt verbally understood.

## 2023-09-29 NOTE — TELEPHONE ENCOUNTER
Caller: Penny Brown    Relationship to patient: Self    Best call back number: [phone]    Date of exposure: LAST WEEK    Date of positive COVID19 test:     COVID19 symptoms: FEVER, HEADACHE, EAR ACHE, COUGH, RUNNY NOSE, SNEEZING    Date of initial quarantine:     Additional information or concerns: PATIENT STATES THAT SHE TOOK AN  COVID TEST ( DEC 2022). PLEASE ADVISE PATIENT IF SHE CAN HAVE A MEDICATION CALLED IN, IF SHE NEEDS TO TAKE ANOTHER TEST, OR WHAT HER NEXT STEPS ARE.     PATIENT STATES THAT HER BLOOD OXYGEN LEVELS ARE GOOD.     PATIENT WOULD LIKE TO KNOW HOW LONG TO QUARANTINE. PATIENT STATES THAT HER HOUSEMATE HAS TESTED POSITIVE AS WELL.     What is the patients preferred pharmacy: [id number] - [place], [place] - [address] AT [facility] [address]  - [phone]  - [phone] FX

## 2023-10-03 ENCOUNTER — OFFICE VISIT (OUTPATIENT)
Dept: INTERNAL MEDICINE | Facility: CLINIC | Age: 73
End: 2023-10-03
Payer: MEDICARE

## 2023-10-03 VITALS
TEMPERATURE: 97.2 F | WEIGHT: 133.3 LBS | SYSTOLIC BLOOD PRESSURE: 104 MMHG | BODY MASS INDEX: 23.62 KG/M2 | DIASTOLIC BLOOD PRESSURE: 62 MMHG | OXYGEN SATURATION: 96 % | HEIGHT: 63 IN | HEART RATE: 70 BPM

## 2023-10-03 DIAGNOSIS — H69.91 DYSFUNCTION OF RIGHT EUSTACHIAN TUBE: Primary | ICD-10-CM

## 2023-10-03 PROCEDURE — 99213 OFFICE O/P EST LOW 20 MIN: CPT | Performed by: FAMILY MEDICINE

## 2023-10-03 NOTE — PROGRESS NOTES
"Chief Complaint  Flu Symptoms and Earache    Subjective        Penny Brown presents to [facility]  History of Present Illness  Patient appointment to discuss right ear discomfort after flight back from [place] found to have COVID approximately 5 days ago no fever sweats or chills gradually improving symptoms with over-the-counter remedies  Objective   Vital Signs:  /62   Pulse 70   Temp 97.2 °F (36.2 °C)   Ht 160 cm (62.99\")   Wt 60.5 kg (133 lb 4.8 oz)   SpO2 96%   BMI 23.62 kg/m²   Estimated body mass index is 23.62 kg/m² as calculated from the following:    Height as of this encounter: 160 cm (62.99\").    Weight as of this encounter: 60.5 kg (133 lb 4.8 oz).       BMI is within normal parameters. No other follow-up for BMI required.      Physical Exam  Vitals and nursing note reviewed.   Constitutional:       Appearance: Normal appearance.   HENT:      Right Ear: Tympanic membrane, ear canal and external ear normal. There is no impacted cerumen.      Left Ear: Tympanic membrane, ear canal and external ear normal. There is no impacted cerumen.   Cardiovascular:      Rate and Rhythm: Normal rate and regular rhythm.      Pulses: Normal pulses.      Heart sounds: Normal heart sounds.   Pulmonary:      Effort: Pulmonary effort is normal.      Breath sounds: Normal breath sounds.   Lymphadenopathy:      Cervical: No cervical adenopathy.   Neurological:      Mental Status: She is alert.   Psychiatric:         Mood and Affect: Mood normal.         Behavior: Behavior normal.         Thought Content: Thought content normal.         Judgment: Judgment normal.      Result Review :                   Assessment and Plan   Diagnoses and all orders for this visit:    1. Dysfunction of right eustachian tube (Primary)    Claritin 10 mg daily for the next 2 weeks         Follow Up   Return if symptoms worsen or fail to improve, for Recheck.  Patient was given instructions and counseling " regarding her condition or for health maintenance advice. Please see specific information pulled into the AVS if appropriate.

## 2023-10-11 RX ORDER — LEVOTHYROXINE SODIUM 0.03 MG/1
25 TABLET ORAL
Qty: 90 TABLET | Refills: 1 | Status: SHIPPED | OUTPATIENT
Start: 2023-10-11

## 2023-12-12 DIAGNOSIS — E03.9 HYPOTHYROIDISM, UNSPECIFIED TYPE: Primary | ICD-10-CM

## 2023-12-12 RX ORDER — LEVOTHYROXINE SODIUM 0.03 MG/1
25 TABLET ORAL
Qty: 90 TABLET | Refills: 1 | Status: SHIPPED | OUTPATIENT
Start: 2023-12-12

## 2024-01-09 ENCOUNTER — OFFICE VISIT (OUTPATIENT)
Dept: INTERNAL MEDICINE | Facility: CLINIC | Age: 74
End: 2024-01-09
Payer: MEDICARE

## 2024-01-09 VITALS
DIASTOLIC BLOOD PRESSURE: 70 MMHG | TEMPERATURE: 97 F | HEART RATE: 70 BPM | OXYGEN SATURATION: 96 % | HEIGHT: 63 IN | RESPIRATION RATE: 16 BRPM | SYSTOLIC BLOOD PRESSURE: 118 MMHG | BODY MASS INDEX: 24.45 KG/M2 | WEIGHT: 138 LBS

## 2024-01-09 DIAGNOSIS — E03.9 HYPOTHYROIDISM, UNSPECIFIED TYPE: ICD-10-CM

## 2024-01-09 DIAGNOSIS — E78.5 HYPERLIPIDEMIA, UNSPECIFIED HYPERLIPIDEMIA TYPE: ICD-10-CM

## 2024-01-09 DIAGNOSIS — I48.91 NEW ONSET ATRIAL FIBRILLATION: ICD-10-CM

## 2024-01-09 RX ORDER — ROSUVASTATIN CALCIUM 20 MG/1
20 TABLET, COATED ORAL DAILY
Qty: 90 TABLET | Refills: 3 | Status: SHIPPED | OUTPATIENT
Start: 2024-01-09

## 2024-01-09 RX ORDER — LEVOTHYROXINE SODIUM 0.03 MG/1
25 TABLET ORAL
Qty: 90 TABLET | Refills: 1 | Status: SHIPPED | OUTPATIENT
Start: 2024-01-09

## 2024-01-09 NOTE — PROGRESS NOTES
"Chief Complaint  Follow-up (3 month follow up)    Subjective        Penny Brown presents to [facility]  History of Present Illness    Presenting today's office visit with a history of hypothyroidism.  Patient is currently on levothyroxine 25 mcg daily.  She denies any side effects of the medication.    History of hyperlipidemia.  Patient is currently taking Crestor 20 mg daily.  Patient denies any side effects of the medicine.    Patient has a history of A-fib.  She states she is no longer taking the diltiazem.  Diltiazem is now as needed for her.  She is only taking the Eliquis 5 mg twice a day.  She seems to be doing fairly well with this.  She is only had symptoms of A-fib once 3 months ago, but since then has been doing fairly well.  She was told to only use the diltiazem as needed if her symptoms were to occur.  However her heart rate has seems to been fairly stable and she is not experiencing any A-fib.  He wears an Apple Watch which is able to track her heart rhythm, but she states that she has not been in A-fib in well over 3 months, and has been doing fairly well.    Objective   Vital Signs:  /70   Pulse 70   Temp 97 °F (36.1 °C) (Temporal)   Resp 16   Ht 160 cm (62.99\")   Wt 62.6 kg (138 lb)   SpO2 96%   BMI 24.45 kg/m²   Estimated body mass index is 24.45 kg/m² as calculated from the following:    Height as of this encounter: 160 cm (62.99\").    Weight as of this encounter: 62.6 kg (138 lb).       BMI is within normal parameters. No other follow-up for BMI required.      Physical Exam  Vitals and nursing note reviewed.   Constitutional:       Appearance: She is well-developed.   HENT:      Head: Normocephalic and atraumatic.   Musculoskeletal:      Cervical back: Normal range of motion and neck supple.   Neurological:      Mental Status: She is alert and oriented to person, place, and time.   Psychiatric:         Behavior: Behavior normal.        Result " Review :                   Assessment and Plan   Diagnoses and all orders for this visit:    1. Hyperlipidemia, unspecified hyperlipidemia type  -     rosuvastatin (CRESTOR) 20 MG tablet; Take 1 tablet by mouth Daily.  Dispense: 90 tablet; Refill: 3  -     Lipid Panel With LDL / HDL Ratio    2. Hypothyroidism, unspecified type  -     levothyroxine (SYNTHROID, LEVOTHROID) 25 MCG tablet; Take 1 tablet by mouth Every Morning.  Dispense: 90 tablet; Refill: 1  -     Thyroid Panel With TSH    3. New onset atrial fibrillation  -     CBC & Differential  -     Comprehensive Metabolic Panel    Other orders  -     apixaban (ELIQUIS) 5 MG tablet tablet; Take 1 tablet by mouth Every 12 (Twelve) Hours for 360 days.  Dispense: 180 tablet; Refill: 3    For patient's hypothyroidism, continue levothyroxine 25 mcg daily.  For the hyperlipidemia, continue her on Crestor 20 mg daily.  For the A-fib, uses diltiazem as needed.  She is still taking the Eliquis regularly.  She will follow-up with a cardiologist.         Follow Up   No follow-ups on file.  Patient was given instructions and counseling regarding her condition or for health maintenance advice. Please see specific information pulled into the AVS if appropriate.

## 2024-01-10 LAB
ALBUMIN SERPL-MCNC: 4.2 G/DL (ref 3.8–4.8)
ALBUMIN/GLOB SERPL: 1.8 {RATIO} (ref 1.2–2.2)
ALP SERPL-CCNC: 60 IU/L (ref 44–121)
ALT SERPL-CCNC: 20 IU/L (ref 0–32)
AST SERPL-CCNC: 32 IU/L (ref 0–40)
BASOPHILS # BLD AUTO: 0 X10E3/UL (ref 0–0.2)
BASOPHILS NFR BLD AUTO: 1 %
BILIRUB SERPL-MCNC: 0.7 MG/DL (ref 0–1.2)
BUN SERPL-MCNC: 9 MG/DL (ref 8–27)
BUN/CREAT SERPL: 10 (ref 12–28)
CALCIUM SERPL-MCNC: 9.2 MG/DL (ref 8.7–10.3)
CHLORIDE SERPL-SCNC: 105 MMOL/L (ref 96–106)
CHOLEST SERPL-MCNC: 153 MG/DL (ref 100–199)
CO2 SERPL-SCNC: 24 MMOL/L (ref 20–29)
CREAT SERPL-MCNC: 0.91 MG/DL (ref 0.57–1)
EGFRCR SERPLBLD CKD-EPI 2021: 66 ML/MIN/1.73
EOSINOPHIL # BLD AUTO: 0.1 X10E3/UL (ref 0–0.4)
EOSINOPHIL NFR BLD AUTO: 2 %
ERYTHROCYTE [DISTWIDTH] IN BLOOD BY AUTOMATED COUNT: 12.3 % (ref 11.7–15.4)
FT4I SERPL CALC-MCNC: 2.2 (ref 1.2–4.9)
GLOBULIN SER CALC-MCNC: 2.4 G/DL (ref 1.5–4.5)
GLUCOSE SERPL-MCNC: 79 MG/DL (ref 70–99)
HCT VFR BLD AUTO: 39.4 % (ref 34–46.6)
HDLC SERPL-MCNC: 57 MG/DL
HGB BLD-MCNC: 13.4 G/DL (ref 11.1–15.9)
IMM GRANULOCYTES # BLD AUTO: 0 X10E3/UL (ref 0–0.1)
IMM GRANULOCYTES NFR BLD AUTO: 0 %
LDLC SERPL CALC-MCNC: 79 MG/DL (ref 0–99)
LDLC/HDLC SERPL: 1.4 RATIO (ref 0–3.2)
LYMPHOCYTES # BLD AUTO: 1.2 X10E3/UL (ref 0.7–3.1)
LYMPHOCYTES NFR BLD AUTO: 32 %
MCH RBC QN AUTO: 31.1 PG (ref 26.6–33)
MCHC RBC AUTO-ENTMCNC: 34 G/DL (ref 31.5–35.7)
MCV RBC AUTO: 91 FL (ref 79–97)
MONOCYTES # BLD AUTO: 0.3 X10E3/UL (ref 0.1–0.9)
MONOCYTES NFR BLD AUTO: 9 %
NEUTROPHILS # BLD AUTO: 2.1 X10E3/UL (ref 1.4–7)
NEUTROPHILS NFR BLD AUTO: 56 %
PLATELET # BLD AUTO: 209 X10E3/UL (ref 150–450)
POTASSIUM SERPL-SCNC: 4.1 MMOL/L (ref 3.5–5.2)
PROT SERPL-MCNC: 6.6 G/DL (ref 6–8.5)
RBC # BLD AUTO: 4.31 X10E6/UL (ref 3.77–5.28)
SODIUM SERPL-SCNC: 144 MMOL/L (ref 134–144)
T3RU NFR SERPL: 25 % (ref 24–39)
T4 SERPL-MCNC: 8.9 UG/DL (ref 4.5–12)
TRIGL SERPL-MCNC: 90 MG/DL (ref 0–149)
TSH SERPL DL<=0.005 MIU/L-ACNC: 3.57 UIU/ML (ref 0.45–4.5)
VLDLC SERPL CALC-MCNC: 17 MG/DL (ref 5–40)
WBC # BLD AUTO: 3.6 X10E3/UL (ref 3.4–10.8)

## 2024-01-23 ENCOUNTER — TELEPHONE (OUTPATIENT)
Age: 74
End: 2024-01-23
Payer: MEDICARE

## 2024-01-23 NOTE — TELEPHONE ENCOUNTER
Katie from Gastroenterology called stating pt is scheduled for a colonoscopy 2/8. They are asking if he can move forward and if so how long can he hold his apixaban prior to. Pt has NO history of stroke or valve replacement...Kortney

## 2024-01-25 NOTE — TELEPHONE ENCOUNTER
Letter signed and patient informed.    Left voicemail at doctors office asking for call back with fax #.    Spoke with patient and informed her of instructions.

## 2024-02-02 ENCOUNTER — TELEPHONE (OUTPATIENT)
Dept: CARDIOLOGY | Facility: CLINIC | Age: 74
End: 2024-02-02

## 2024-02-02 ENCOUNTER — TELEPHONE (OUTPATIENT)
Dept: CARDIOLOGY | Facility: CLINIC | Age: 74
End: 2024-02-02
Payer: MEDICARE

## 2024-02-02 NOTE — TELEPHONE ENCOUNTER
Pt called to say that she is currently in A-Fib. She has taken Diltiazem and it has not gotten her back into Sinus rhythm.     She wants to know if she should go to the ER?     She also wants to know if She should have her Colonoscopy next week due to concerns with Abhay.    Alicia CASTELLANOS

## 2024-02-02 NOTE — TELEPHONE ENCOUNTER
The patient called back.  I let her know CR recommendations about her extra diltiazem.      Ruben and Dian, I think you both responded at the same time and have different thought on the cscope.    She is going to see Dr. Abbott on Tuesday and her scope is on Thursday.  If she is going to have the scope she would need to start holding her AC on Tuesday AM.    Yes to cscope or wait until she sees KP?  I just want to make sure we are all on same page.    Kenia Morgan RN  [facility] Triage  02/02/24 10:59 EST

## 2024-02-02 NOTE — TELEPHONE ENCOUNTER
That makes sense Ruben, thank you for the help.  Patient is good with plan to cancel scope for now and stay on eliquis so we can first address the afib.    Kenia Morgan RN  [facility] Triage  02/02/24 12:37 EST

## 2024-02-02 NOTE — TELEPHONE ENCOUNTER
Pt called in very anxious about being in Afib since 6:30am this morning.  States that she took diltiazem 60mg this am as instructed if she went into Afib.  HR running , has not checked BP, pt complains of some headache, a little lightheadedness at times and some GARCIA.  Asking if she should call 911.  I reassured her that her rate was controlled and she was doing fine right now.  She would like to know if it is safe for her to drive.  She would also like to know if she should delay her colonoscopy scheduled for next week, as she is afraid to be off of eliquis for 2 days while she is in Afib.    She is scheduled to see Dr. Abbott on 2/6 already.  Recommendations?    Thanks so much,  Alfreda Meredith, RN  Triage RN  02/02/24 10:32 EST

## 2024-02-02 NOTE — TELEPHONE ENCOUNTER
I called and spoke to patient.  I let her know she can always leave her cscope scheduled and let KP decide if she should proceed or not on Tuesday.  She just prefers to postpone her scope for piece of mind.  She reports it is a screening scope.  She is going to stay on her eliquis.  She has her instructions for extra diltiazem if she needs it.  She will see KP as scheduled on Tuesday.  I advised her not to drive if she is dizzy or feels really poorly.  Otherwise she can do normal activity as long as she feels up to it.    I let her know that if she is having issues after hours or over the weekend we do have an APRN on call if she calls it cannot wait until normal business hours.  She does admit to being anxious about this.  I did reassure her that she has converted on her own previously and also she is on blood thinner which is one of the most important parts of afib treatment to prevent stroke.    She had no further questions at this time.    Thanks for everyone's help.    Kenia Morgan RN  [facility] Triage  02/02/24 11:28 EST

## 2024-02-06 ENCOUNTER — OFFICE VISIT (OUTPATIENT)
Age: 74
End: 2024-02-06
Payer: MEDICARE

## 2024-02-06 VITALS
WEIGHT: 141 LBS | DIASTOLIC BLOOD PRESSURE: 82 MMHG | SYSTOLIC BLOOD PRESSURE: 124 MMHG | HEART RATE: 57 BPM | BODY MASS INDEX: 24.98 KG/M2 | HEIGHT: 63 IN

## 2024-02-06 DIAGNOSIS — I48.0 PAROXYSMAL ATRIAL FIBRILLATION: Primary | ICD-10-CM

## 2024-02-06 PROCEDURE — 99214 OFFICE O/P EST MOD 30 MIN: CPT | Performed by: INTERNAL MEDICINE

## 2024-02-06 PROCEDURE — 93000 ELECTROCARDIOGRAM COMPLETE: CPT | Performed by: INTERNAL MEDICINE

## 2024-02-06 NOTE — PROGRESS NOTES
Date of Office Visit: 2024  Encounter Provider: Jack Abbott MD  Place of Service: [facility] CARDIOLOGY  Patient Name: Penny rBown  :1950    Chief Complaint   Patient presents with    paroxysmal AFIB   :     HPI: Penny Brown is a 73 y.o. female who presents today for PAF    Since I last saw here she reports two episodes of atrial fibrillation.     This was over about 6 months.     The episodes where moderate symptomatic, she didn't feel like walking around her house    She took PRN diltiazem    She has been taking the apixaban.           Past Medical History:   Diagnosis Date    Dizziness     Fibrocystic breast     GERD (gastroesophageal reflux disease)     Hyperlipidemia     Hypothyroidism     PAD (peripheral artery disease)     Seizures     as a child; LAST SEIZURE AT AGE 28    Syncopal episodes        Past Surgical History:   Procedure Laterality Date    BLADDER SURGERY      bladder lift    CERVICAL CONE BIOPSY  [id number]    COLONOSCOPY  2014    EYE SURGERY Right 2016    TEAR DUCT SURGERY/PARTIAL REMOVAL OF NASAL SINUS    EYE SURGERY Left 2011    TEAR DUCT SURGERY/DACRYOCYSTIRTHINOSTOMY AND REMOVAL OF ETHMOID SINUS    HYSTERECTOMY  2015    UTEROSACRAL SUSPENSION AND HYSTERECTOMY    OOPHORECTOMY      PAP SMEAR  2018    VOCAL CORD BIOPSY  2009       Social History     Socioeconomic History    Marital status:     Number of children: 1   Tobacco Use    Smoking status: Never    Smokeless tobacco: Never   Vaping Use    Vaping Use: Never used   Substance and Sexual Activity    Alcohol use: Yes     Alcohol/week: 1.0 - 2.0 standard drink of alcohol     Types: 1 - 2 Shots of liquor per week     Comment: 2-4/MONTH    Drug use: No    Sexual activity: Never       Family History   Problem Relation Age of Onset    Diabetes Maternal Grandmother     Heart failure Mother     Thyroid disease Mother     Lung disease Brother      Alcohol abuse Maternal Aunt     Cancer Maternal Aunt     Breast cancer Maternal Aunt     Alcohol abuse Maternal Uncle     Cancer Maternal Uncle     Diabetes Maternal Uncle     Ovarian cancer Neg Hx        Review of Systems   Constitutional: Negative for malaise/fatigue.   HENT: Negative.     Eyes: Negative.    Cardiovascular:  Positive for palpitations. Negative for chest pain, dyspnea on exertion, leg swelling and near-syncope.   Respiratory:  Negative for cough and shortness of breath.    Endocrine: Negative.    Hematologic/Lymphatic: Negative.    Skin: Negative.    Musculoskeletal: Negative.    Gastrointestinal: Negative.    Genitourinary: Negative.    Neurological: Negative.  Negative for weakness.   Psychiatric/Behavioral: Negative.     Allergic/Immunologic: Negative.        Allergies   Allergen Reactions    Other Hives and Rash     FROM A STEROID INJECTION MANY YEARS AGO      Prednisone Rash         Current Outpatient Medications:     apixaban (ELIQUIS) 5 MG tablet tablet, Take 1 tablet by mouth Every 12 (Twelve) Hours for 360 days., Disp: 180 tablet, Rfl: 3    Calcium-Vitamin D-Vitamin K (Viactiv Calcium Plus D) 650-12.5-40 MG-MCG-MCG chewable tablet, Chew 1 each 2 (two) times a day., Disp: , Rfl:     clobetasol (TEMOVATE) 0.05 % cream, Apply  topically to the appropriate area as directed Daily. As needed, Disp: , Rfl:     dilTIAZem (CARDIZEM) 30 MG tablet, TAKE 2 TABLETS BY MOUTH TWICE  DAILY AS NEEDED FOR AFIB,  TACHYCARDIA, OR HEART RATE  GREATER THAN 100, Disp: 180 tablet, Rfl: 7    estradiol (ESTRACE) 0.1 MG/GM vaginal cream, As Needed. Uses as needed, Disp: , Rfl:     levothyroxine (SYNTHROID, LEVOTHROID) 25 MCG tablet, Take 1 tablet by mouth Every Morning., Disp: 90 tablet, Rfl: 1    LORazepam (Ativan) 0.5 MG tablet, Take 1 tablet by mouth Every 8 (Eight) Hours As Needed for Anxiety. (Patient taking differently: Take 1 tablet by mouth Every 8 (Eight) Hours As Needed for Anxiety. As needed rarely uses),  "Disp: 10 tablet, Rfl: 0    multivitamin with minerals tablet tablet, Take 1 tablet by mouth Daily., Disp: , Rfl:     omeprazole (priLOSEC) 20 MG capsule, Take 1 capsule by mouth Daily As Needed., Disp: , Rfl:     rosuvastatin (CRESTOR) 20 MG tablet, Take 1 tablet by mouth Daily., Disp: 90 tablet, Rfl: 3      Objective:     Vitals:    02/06/24 1016   BP: 124/82   Pulse: 57   Weight: 64 kg (141 lb)   Height: 160 cm (62.99\")     Body mass index is 24.98 kg/m².    PHYSICAL EXAM:    Vitals and nursing note reviewed.   Constitutional:       General: Not in acute distress.     Appearance: Well-developed. Not diaphoretic.   Eyes:      General:         Right eye: No discharge.         Left eye: No discharge.   HENT:      Head: Normocephalic and atraumatic.    Mouth/Throat:      Pharynx: No oropharyngeal exudate.   Neck:      Thyroid: No thyromegaly.   Pulmonary:      Effort: Pulmonary effort is normal. No respiratory distress.      Breath sounds: Normal breath sounds.   Cardiovascular:      Normal rate. Regular rhythm.   Edema:     Peripheral edema absent.   Abdominal:      General: There is no distension.      Palpations: Abdomen is soft.      Tenderness: There is no abdominal tenderness.   Musculoskeletal:         General: No deformity.      Cervical back: Normal range of motion and neck supple. Skin:     General: Skin is warm and dry.   Neurological:      Mental Status: Alert and oriented to person, place, and time.      Deep Tendon Reflexes: Reflexes are normal and symmetric.   Psychiatric:         Behavior: Behavior normal.         Thought Content: Thought content normal.         Judgment: Judgment normal.             ECG 12 Lead    Date/Time: 2/6/2024 11:26 AM  Performed by: Jack Abbott MD    Authorized by: Jack Abbott MD  Comparison: compared with previous ECG   Rhythm: sinus rhythm            Assessment:       Diagnosis Plan   1. Paroxysmal atrial fibrillation               Plan:       Some afib " episodes, symptoms, but managing well.  Minimal impact on life.     She didn't want to take daily medications or go forward with ablation just yet    Follow-up in 3 months    As always, it has been a pleasure to participate in your patient's care.      Sincerely,         Jack Abbott MD

## 2024-02-22 ENCOUNTER — PATIENT MESSAGE (OUTPATIENT)
Dept: CARDIOLOGY | Facility: CLINIC | Age: 74
End: 2024-02-22
Payer: MEDICARE

## 2024-02-26 ENCOUNTER — OFFICE VISIT (OUTPATIENT)
Dept: INTERNAL MEDICINE | Facility: CLINIC | Age: 74
End: 2024-02-26
Payer: MEDICARE

## 2024-02-26 VITALS
WEIGHT: 137.8 LBS | HEIGHT: 63 IN | TEMPERATURE: 98 F | DIASTOLIC BLOOD PRESSURE: 88 MMHG | SYSTOLIC BLOOD PRESSURE: 114 MMHG | HEART RATE: 74 BPM | OXYGEN SATURATION: 95 % | BODY MASS INDEX: 24.41 KG/M2

## 2024-02-26 DIAGNOSIS — I10 ESSENTIAL HYPERTENSION: ICD-10-CM

## 2024-02-26 DIAGNOSIS — R73.03 PREDIABETES: ICD-10-CM

## 2024-02-26 DIAGNOSIS — Z00.00 HEALTHCARE MAINTENANCE: ICD-10-CM

## 2024-02-26 DIAGNOSIS — E03.9 HYPOTHYROIDISM, UNSPECIFIED TYPE: ICD-10-CM

## 2024-02-26 DIAGNOSIS — F41.9 ANXIETY: ICD-10-CM

## 2024-02-26 DIAGNOSIS — I48.91 ATRIAL FIBRILLATION, UNSPECIFIED TYPE: ICD-10-CM

## 2024-02-26 DIAGNOSIS — E78.5 HYPERLIPIDEMIA, UNSPECIFIED HYPERLIPIDEMIA TYPE: ICD-10-CM

## 2024-02-26 DIAGNOSIS — R00.2 HEART PALPITATIONS: ICD-10-CM

## 2024-02-26 DIAGNOSIS — F41.0 PANIC ATTACK: ICD-10-CM

## 2024-02-26 DIAGNOSIS — Z00.00 WELL WOMAN EXAM (NO GYNECOLOGICAL EXAM): Primary | ICD-10-CM

## 2024-02-26 DIAGNOSIS — Z00.00 MEDICARE ANNUAL WELLNESS VISIT, SUBSEQUENT: ICD-10-CM

## 2024-02-26 DIAGNOSIS — I48.91 NEW ONSET ATRIAL FIBRILLATION: ICD-10-CM

## 2024-02-26 RX ORDER — LEVOTHYROXINE SODIUM 0.03 MG/1
25 TABLET ORAL
Qty: 90 TABLET | Refills: 1 | Status: SHIPPED | OUTPATIENT
Start: 2024-02-26

## 2024-02-26 RX ORDER — ROSUVASTATIN CALCIUM 20 MG/1
20 TABLET, COATED ORAL DAILY
Qty: 90 TABLET | Refills: 3 | Status: SHIPPED | OUTPATIENT
Start: 2024-02-26

## 2024-02-26 RX ORDER — LORAZEPAM 0.5 MG/1
0.5 TABLET ORAL EVERY 8 HOURS PRN
Qty: 16 TABLET | Refills: 0 | Status: SHIPPED | OUTPATIENT
Start: 2024-02-26

## 2024-02-26 NOTE — PROGRESS NOTES
The ABCs of the Annual Wellness Visit  Subsequent Medicare Wellness Visit    Subjective      Penny Brown is a 73 y.o. female who presents for a Subsequent Medicare Wellness Visit.    The following portions of the patient's history were reviewed and   updated as appropriate: allergies, current medications, past family history, past medical history, past social history, past surgical history, and problem list.    Compared to one year ago, the patient feels her physical   health is worse.    Compared to one year ago, the patient feels her mental   health is worse.    Recent Hospitalizations:  She was admitted within the past 365 days at [facility].       Current Medical Providers:  Patient Care Team:  Julian Garg MD as PCP - General (Family Medicine)  Mario Goel MD as Consulting Physician (Gastroenterology)    Outpatient Medications Prior to Visit   Medication Sig Dispense Refill    Calcium-Vitamin D-Vitamin K (Viactiv Calcium Plus D) 650-12.5-40 MG-MCG-MCG chewable tablet Chew 1 each 2 (two) times a day.      clobetasol (TEMOVATE) 0.05 % cream Apply  topically to the appropriate area as directed Daily. As needed      estradiol (ESTRACE) 0.1 MG/GM vaginal cream As Needed. Uses as needed      multivitamin with minerals tablet tablet Take 1 tablet by mouth Daily.      omeprazole (priLOSEC) 20 MG capsule Take 1 capsule by mouth Daily As Needed.      apixaban (ELIQUIS) 5 MG tablet tablet Take 1 tablet by mouth Every 12 (Twelve) Hours for 360 days. 180 tablet 3    dilTIAZem (CARDIZEM) 30 MG tablet TAKE 2 TABLETS BY MOUTH TWICE  DAILY AS NEEDED FOR AFIB,  TACHYCARDIA, OR HEART RATE  GREATER THAN 100 (Patient taking differently: Take 1 tablet by mouth 2 (Two) Times a Day. 1 tablet 2 times daily) 180 tablet 7    levothyroxine (SYNTHROID, LEVOTHROID) 25 MCG tablet Take 1 tablet by mouth Every Morning. 90 tablet 1    LORazepam (Ativan) 0.5 MG tablet Take 1 tablet by mouth Every 8 (Eight) Hours As Needed for  "Anxiety. (Patient taking differently: Take 1 tablet by mouth Every 8 (Eight) Hours As Needed for Anxiety. As needed rarely uses) 10 tablet 0    rosuvastatin (CRESTOR) 20 MG tablet Take 1 tablet by mouth Daily. 90 tablet 3     No facility-administered medications prior to visit.       No opioid medication identified on active medication list. I have reviewed chart for other potential  high risk medication/s and harmful drug interactions in the elderly.        Aspirin is not on active medication list.  Aspirin use is contraindicated for this patient due to: current use of Eliquis.  .    Patient Active Problem List   Diagnosis    Arcus senilis of both corneas    Cystocele    Epiphora due to insufficient drainage of right side    Incomplete emptying of bladder    OAB (overactive bladder)    Senile cataracts of both eyes    Uterovaginal prolapse, incomplete    Hypothyroidism    Mixed hyperlipidemia    Prediabetes    Leg cramps    Vertigo    Hypotension due to hypovolemia    Anxiety    Paroxysmal atrial fibrillation    Dysfunction of right eustachian tube     Advance Care Planning   Advance Care Planning     Advance Directive is not on file.  ACP discussion was held with the patient during this visit. Patient has an advance directive (not in EMR), copy requested.     Objective    Vitals:    02/26/24 1011   BP: 114/88   Pulse: 74   Temp: 98 °F (36.7 °C)   SpO2: 95%   Weight: 62.5 kg (137 lb 12.8 oz)   Height: 160 cm (62.99\")     Estimated body mass index is 24.42 kg/m² as calculated from the following:    Height as of this encounter: 160 cm (62.99\").    Weight as of this encounter: 62.5 kg (137 lb 12.8 oz).    BMI is within normal parameters. No other follow-up for BMI required.      Does the patient have evidence of cognitive impairment?   No    Lab Results   Component Value Date    CHLPL 153 01/09/2024    TRIG 90 01/09/2024    HDL 57 01/09/2024    LDL 79 01/09/2024    VLDL 17 01/09/2024          HEALTH RISK " ASSESSMENT    Smoking Status:  Social History     Tobacco Use   Smoking Status Never   Smokeless Tobacco Never     Alcohol Consumption:  Social History     Substance and Sexual Activity   Alcohol Use Yes    Alcohol/week: 1.0 - 2.0 standard drink of alcohol    Types: 1 - 2 Shots of liquor per week    Comment: 2-4/MONTH     Fall Risk Screen:    YOSEPH Fall Risk Assessment was completed, and patient is at MODERATE risk for falls. Assessment completed on:2024    Depression Screenin/9/2024    11:28 AM   PHQ-2/PHQ-9 Depression Screening   Little Interest or Pleasure in Doing Things 0-->not at all   Feeling Down, Depressed or Hopeless 0-->not at all   PHQ-9: Brief Depression Severity Measure Score 0       Health Habits and Functional and Cognitive Screenin/24/2024     9:59 AM   Functional & Cognitive Status   Do you have difficulty preparing food and eating? No   Do you have difficulty bathing yourself, getting dressed or grooming yourself? No   Do you have difficulty using the toilet? No   Do you have difficulty moving around from place to place? No   Do you have trouble with steps or getting out of a bed or a chair? No   Current Diet Other   Dental Exam Up to date   Eye Exam Up to date   Exercise (times per week) 3 times per week   Current Exercises Include House Cleaning;Other;Walking;Yard Work   Do you need help using the phone?  No   Are you deaf or do you have serious difficulty hearing?  No   Do you need help to go to places out of walking distance? No   Do you need help shopping? No   Do you need help preparing meals?  No   Do you need help with housework?  Yes   Do you need help with laundry? No   Do you need help taking your medications? No   Do you need help managing money? No   Do you ever drive or ride in a car without wearing a seat belt? No   Have you felt unusual stress, anger or loneliness in the last month? Yes   Who do you live with? Other   If you need help, do you have trouble  finding someone available to you? No   Have you been bothered in the last four weeks by sexual problems? No   Do you have difficulty concentrating, remembering or making decisions? No       Age-appropriate Screening Schedule:  Refer to the list below for future screening recommendations based on patient's age, sex and/or medical conditions. Orders for these recommended tests are listed in the plan section. The patient has been provided with a written plan.    Health Maintenance   Topic Date Due    RSV Vaccine - Adults (1 - 1-dose 60+ series) Never done    PAP SMEAR  01/05/2023    COVID-19 Vaccine (4 - 2023-24 season) 09/01/2023    MAMMOGRAM  01/17/2024    ANNUAL WELLNESS VISIT  01/18/2024    DXA SCAN  04/25/2024    LIPID PANEL  01/09/2025    TDAP/TD VACCINES (3 - Td or Tdap) 08/27/2029    COLORECTAL CANCER SCREENING  09/09/2029    HEPATITIS C SCREENING  Completed    Pneumococcal Vaccine 65+  Completed    ZOSTER VACCINE  Completed    INFLUENZA VACCINE  Discontinued                  CMS Preventative Services Quick Reference  Risk Factors Identified During Encounter:    None Identified    The above risks/problems have been discussed with the patient.  Pertinent information has been shared with the patient in the After Visit Summary.    Diagnoses and all orders for this visit:    1. Well woman exam (no gynecological exam) (Primary)    2. Medicare annual wellness visit, subsequent    3. Heart palpitations    4. Atrial fibrillation, unspecified type  -     apixaban (ELIQUIS) 5 MG tablet tablet; Take 1 tablet by mouth Every 12 (Twelve) Hours for 360 days.  Dispense: 180 tablet; Refill: 3    5. Essential hypertension    6. New onset atrial fibrillation  -     dilTIAZem (CARDIZEM) 30 MG tablet; Take 1 tablet by mouth 2 (Two) Times a Day. 1 tablet 2 times daily  Dispense: 180 tablet; Refill: 3    7. Hypothyroidism, unspecified type  -     levothyroxine (SYNTHROID, LEVOTHROID) 25 MCG tablet; Take 1 tablet by mouth Every  Morning.  Dispense: 90 tablet; Refill: 1    8. Hyperlipidemia, unspecified hyperlipidemia type  -     rosuvastatin (CRESTOR) 20 MG tablet; Take 1 tablet by mouth Daily.  Dispense: 90 tablet; Refill: 3    9. Healthcare maintenance    10. Anxiety  -     LORazepam (Ativan) 0.5 MG tablet; Take 1 tablet by mouth Every 8 (Eight) Hours As Needed for Anxiety.  Dispense: 16 tablet; Refill: 0    11. Panic attack  -     LORazepam (Ativan) 0.5 MG tablet; Take 1 tablet by mouth Every 8 (Eight) Hours As Needed for Anxiety.  Dispense: 16 tablet; Refill: 0    12. Prediabetes        Follow Up:   Next Medicare Wellness visit to be scheduled in 1 year.      An After Visit Summary and PPPS were made available to the patient.

## 2024-02-27 LAB
ALBUMIN SERPL-MCNC: 4.5 G/DL (ref 3.5–5.2)
ALBUMIN/GLOB SERPL: 1.8 G/DL
ALP SERPL-CCNC: 65 U/L (ref 39–117)
ALT SERPL-CCNC: 20 U/L (ref 1–33)
AST SERPL-CCNC: 30 U/L (ref 1–32)
BASOPHILS # BLD AUTO: 0.02 10*3/MM3 (ref 0–0.2)
BASOPHILS NFR BLD AUTO: 0.4 % (ref 0–1.5)
BILIRUB SERPL-MCNC: 0.8 MG/DL (ref 0–1.2)
BUN SERPL-MCNC: 12 MG/DL (ref 8–23)
BUN/CREAT SERPL: 12.6 (ref 7–25)
CALCIUM SERPL-MCNC: 9.5 MG/DL (ref 8.6–10.5)
CHLORIDE SERPL-SCNC: 102 MMOL/L (ref 98–107)
CHOLEST SERPL-MCNC: 160 MG/DL (ref 0–200)
CO2 SERPL-SCNC: 25.6 MMOL/L (ref 22–29)
CREAT SERPL-MCNC: 0.95 MG/DL (ref 0.57–1)
EGFRCR SERPLBLD CKD-EPI 2021: 62.6 ML/MIN/1.73
EOSINOPHIL # BLD AUTO: 0.04 10*3/MM3 (ref 0–0.4)
EOSINOPHIL NFR BLD AUTO: 0.8 % (ref 0.3–6.2)
ERYTHROCYTE [DISTWIDTH] IN BLOOD BY AUTOMATED COUNT: 12 % (ref 12.3–15.4)
FT4I SERPL CALC-MCNC: 2.4 (ref 1.2–4.9)
GLOBULIN SER CALC-MCNC: 2.5 GM/DL
GLUCOSE SERPL-MCNC: 87 MG/DL (ref 65–99)
HBA1C MFR BLD: 6.1 % (ref 4.8–5.6)
HCT VFR BLD AUTO: 42.9 % (ref 34–46.6)
HDLC SERPL-MCNC: 59 MG/DL (ref 40–60)
HGB BLD-MCNC: 14.1 G/DL (ref 12–15.9)
IMM GRANULOCYTES # BLD AUTO: 0.02 10*3/MM3 (ref 0–0.05)
IMM GRANULOCYTES NFR BLD AUTO: 0.4 % (ref 0–0.5)
LDLC SERPL CALC-MCNC: 79 MG/DL (ref 0–100)
LDLC/HDLC SERPL: 1.28 {RATIO}
LYMPHOCYTES # BLD AUTO: 1.21 10*3/MM3 (ref 0.7–3.1)
LYMPHOCYTES NFR BLD AUTO: 22.7 % (ref 19.6–45.3)
MCH RBC QN AUTO: 30.8 PG (ref 26.6–33)
MCHC RBC AUTO-ENTMCNC: 32.9 G/DL (ref 31.5–35.7)
MCV RBC AUTO: 93.7 FL (ref 79–97)
MONOCYTES # BLD AUTO: 0.43 10*3/MM3 (ref 0.1–0.9)
MONOCYTES NFR BLD AUTO: 8.1 % (ref 5–12)
NEUTROPHILS # BLD AUTO: 3.61 10*3/MM3 (ref 1.7–7)
NEUTROPHILS NFR BLD AUTO: 67.6 % (ref 42.7–76)
NRBC BLD AUTO-RTO: 0 /100 WBC (ref 0–0.2)
PLATELET # BLD AUTO: 215 10*3/MM3 (ref 140–450)
POTASSIUM SERPL-SCNC: 4.4 MMOL/L (ref 3.5–5.2)
PROT SERPL-MCNC: 7 G/DL (ref 6–8.5)
RBC # BLD AUTO: 4.58 10*6/MM3 (ref 3.77–5.28)
SODIUM SERPL-SCNC: 140 MMOL/L (ref 136–145)
T3RU NFR SERPL: 24 % (ref 24–39)
T4 SERPL-MCNC: 10 UG/DL (ref 4.5–12)
TRIGL SERPL-MCNC: 126 MG/DL (ref 0–150)
TSH SERPL DL<=0.005 MIU/L-ACNC: 3.15 UIU/ML (ref 0.45–4.5)
VLDLC SERPL CALC-MCNC: 22 MG/DL (ref 5–40)
WBC # BLD AUTO: 5.33 10*3/MM3 (ref 3.4–10.8)

## 2024-02-28 NOTE — PROGRESS NOTES
Chief Complaint  Medicare Wellness-subsequent    Subjective          Penny Brown presents to [facility]  History of Present Illness  The patient is a 73-year-old female who presents for evaluation of multiple medical concerns.    On 02/15/2024, she had to call 911 because her blood pressure shot up in the middle of the night to 180/101. At that time, she was having chills, but she did not have a fever. She had a bad headache and she shook uncontrollably for about 2 minutes. She took 1 lorazepam tablet and by the time the paramedics arrived about 5 minutes later, her blood pressure dropped to about 157/100. She has been taking Eliquis, but she did not take it the whole night. She never went into atrial fibrillation. She did not go to the hospital thinking perhaps she had panic, but it did not help to cause the elevated reading. Since then, she has been anxious during this happening again. She sent this to the cardiologist who wrote there are tons of blood pressure medications that could be considered if blood pressure is running high. She was recommended to discuss with her PCP about blood pressure management. Her blood pressure has mostly been good since then. On 02/22/2024, she was walking fast and her pulse was running continuously between 95 and 104. She was on her way home and she started feeling odd. She looked at her Apple watch and her pulse was going up. She slowed down and her pulse continued to go up. By the time she got home, which was about a minute, her pulse was 125. She took her blood pressure and it was 145/78. She took 2 diltiazem because her heart rate was 125. Her current prescription is to take diltiazem as needed. After just an hour of taking these 2 medications, she was lightheaded. He suggested that she go back to taking it every day, but only take 1 twice a day. She has been taking diltiazem 30 mg tablets twice a day for the last couple of days. Since then,  "she has not had any episodes. She had just seen the cardiologist before all of this happened. She does not have another appointment for several months. Over this past weekend, she took her blood pressure and it was 88/64.    She still has 6 lorazepam left. Her anxiety has been better since she started taking lorazepam every day. She never thought she would be so worried about dying, but now she is adding to her anxiety. Some days, it will be good and some days, it will be bad.    Objective   Vital Signs:   /88   Pulse 74   Temp 98 °F (36.7 °C)   Ht 160 cm (62.99\")   Wt 62.5 kg (137 lb 12.8 oz)   SpO2 95%   BMI 24.42 kg/m²     Physical Exam  Vitals and nursing note reviewed.   Constitutional:       Appearance: She is well-developed.   HENT:      Head: Normocephalic and atraumatic.      Right Ear: External ear normal.      Left Ear: External ear normal.   Cardiovascular:      Rate and Rhythm: Normal rate and regular rhythm.      Heart sounds: Normal heart sounds.   Pulmonary:      Effort: Pulmonary effort is normal. No respiratory distress.      Breath sounds: Normal breath sounds.   Abdominal:      Palpations: Abdomen is soft.      Tenderness: There is no abdominal tenderness. There is no guarding.   Musculoskeletal:         General: Normal range of motion.      Cervical back: Normal range of motion and neck supple.   Lymphadenopathy:      Cervical: No cervical adenopathy.   Skin:     General: Skin is warm.   Neurological:      Mental Status: She is alert and oriented to person, place, and time.   Psychiatric:         Behavior: Behavior normal.         Physical Exam  Vital Signs  Blood pressure is 114/88. Heart rate is 74.     Result Review :                 Assessment and Plan    Diagnoses and all orders for this visit:        Heart palpitations    Atrial fibrillation, unspecified type  -     apixaban (ELIQUIS) 5 MG tablet tablet; Take 1 tablet by mouth Every 12 (Twelve) Hours for 360 days.  Dispense: " 180 tablet; Refill: 3    Essential hypertension  -     CBC & Differential  -     Comprehensive Metabolic Panel    New onset atrial fibrillation  -     dilTIAZem (CARDIZEM) 30 MG tablet; Take 1 tablet by mouth 2 (Two) Times a Day. 1 tablet 2 times daily  Dispense: 180 tablet; Refill: 3    Hypothyroidism, unspecified type  -     levothyroxine (SYNTHROID, LEVOTHROID) 25 MCG tablet; Take 1 tablet by mouth Every Morning.  Dispense: 90 tablet; Refill: 1  -     Thyroid Panel With TSH    Hyperlipidemia, unspecified hyperlipidemia type  -     rosuvastatin (CRESTOR) 20 MG tablet; Take 1 tablet by mouth Daily.  Dispense: 90 tablet; Refill: 3  -     Lipid Panel With LDL / HDL Ratio    Healthcare maintenance    Anxiety  -     LORazepam (Ativan) 0.5 MG tablet; Take 1 tablet by mouth Every 8 (Eight) Hours As Needed for Anxiety.  Dispense: 16 tablet; Refill: 0    Panic attack  -     LORazepam (Ativan) 0.5 MG tablet; Take 1 tablet by mouth Every 8 (Eight) Hours As Needed for Anxiety.  Dispense: 16 tablet; Refill: 0    Prediabetes  -     Hemoglobin A1c      Assessment & Plan  1. Hypertension.  Her blood pressure looks good today at 114/88. Her heart rate today is 74. She will start taking diltiazem 30 mg twice a day. She will continue Eliquis 5 mg twice a day.    2. Anxiety.  I will refill her lorazepam.    3. Atrial fibrillation.  Her blood pressure dropped to 157/100. She will continue diltiazem 30 mg twice a day.    4. Hyperlipidemia.  She will continue Crestor 20 mg daily.    5. Hypothyroidism.  Her anxiety is adding to her anxiety.    She will continue levothyroxine 25 mcg daily.    Follow-up  The patient will follow up in 1 month.    Follow Up   No follow-ups on file.  Patient was given instructions and counseling regarding her condition or for health maintenance advice. Please see specific information pulled into the AVS if appropriate.       Patient or patient representative verbalized consent for the use of Ambient Listening  during the visit with  Julian Garg MD for chart documentation. 2/28/2024  08:44 EST

## 2024-02-28 NOTE — PROGRESS NOTES
Subjective   Penny Brown is a 73 y.o. female and is here for a comprehensive physical exam. The patient reports no problems.    Pt is UTD with annual gyn exam and mammo           Social History:   Social History     Socioeconomic History    Marital status:     Number of children: 1   Tobacco Use    Smoking status: Never    Smokeless tobacco: Never   Vaping Use    Vaping Use: Never used   Substance and Sexual Activity    Alcohol use: Yes     Alcohol/week: 1.0 - 2.0 standard drink of alcohol     Types: 1 - 2 Shots of liquor per week     Comment: 2-4/MONTH    Drug use: No    Sexual activity: Never       Family History:   Family History   Problem Relation Age of Onset    Diabetes Maternal Grandmother     Heart failure Mother     Thyroid disease Mother     Lung disease Brother     Alcohol abuse Maternal Aunt     Cancer Maternal Aunt     Breast cancer Maternal Aunt     Alcohol abuse Maternal Uncle     Cancer Maternal Uncle     Diabetes Maternal Uncle     Ovarian cancer Neg Hx        Past Medical History:   Past Medical History:   Diagnosis Date    Dizziness     Fibrocystic breast     GERD (gastroesophageal reflux disease)     Hyperlipidemia     Hypothyroidism     PAD (peripheral artery disease)     Seizures     as a child; LAST SEIZURE AT AGE 28    Syncopal episodes        The following portions of the patient's history were reviewed and updated as appropriate: allergies, current medications, past family history, past medical history, past social history, past surgical history and problem list.    Review of Systems    Review of Systems   Constitutional:  Negative for chills and fever.   HENT:  Negative for congestion, rhinorrhea, sinus pain and sore throat.    Eyes:  Negative for photophobia and visual disturbance.   Respiratory:  Negative for cough, chest tightness and shortness of breath.    Cardiovascular:  Negative for chest pain and palpitations.   Gastrointestinal:  Negative for diarrhea, nausea and  vomiting.   Genitourinary:  Negative for dysuria, frequency and urgency.   Skin:  Negative for rash and wound.   Neurological:  Negative for dizziness and syncope.   Psychiatric/Behavioral:  Negative for behavioral problems and confusion.        Objective   Physical Exam  Vitals and nursing note reviewed.   Constitutional:       Appearance: She is well-developed.   HENT:      Head: Normocephalic and atraumatic.      Right Ear: External ear normal.      Left Ear: External ear normal.   Cardiovascular:      Rate and Rhythm: Normal rate and regular rhythm.      Heart sounds: Normal heart sounds.   Pulmonary:      Effort: Pulmonary effort is normal. No respiratory distress.      Breath sounds: Normal breath sounds.   Abdominal:      Palpations: Abdomen is soft.      Tenderness: There is no abdominal tenderness. There is no guarding.   Musculoskeletal:         General: Normal range of motion.      Cervical back: Normal range of motion and neck supple.   Lymphadenopathy:      Cervical: No cervical adenopathy.   Skin:     General: Skin is warm.   Neurological:      Mental Status: She is alert and oriented to person, place, and time.   Psychiatric:         Behavior: Behavior normal.         Vitals:    02/26/24 1011   BP: 114/88   Pulse: 74   Temp: 98 °F (36.7 °C)   SpO2: 95%     Body mass index is 24.42 kg/m².      Medications:   Current Outpatient Medications:     apixaban (ELIQUIS) 5 MG tablet tablet, Take 1 tablet by mouth Every 12 (Twelve) Hours for 360 days., Disp: 180 tablet, Rfl: 3    Calcium-Vitamin D-Vitamin K (Viactiv Calcium Plus D) 650-12.5-40 MG-MCG-MCG chewable tablet, Chew 1 each 2 (two) times a day., Disp: , Rfl:     clobetasol (TEMOVATE) 0.05 % cream, Apply  topically to the appropriate area as directed Daily. As needed, Disp: , Rfl:     dilTIAZem (CARDIZEM) 30 MG tablet, Take 1 tablet by mouth 2 (Two) Times a Day. 1 tablet 2 times daily, Disp: 180 tablet, Rfl: 3    estradiol (ESTRACE) 0.1 MG/GM vaginal  cream, As Needed. Uses as needed, Disp: , Rfl:     levothyroxine (SYNTHROID, LEVOTHROID) 25 MCG tablet, Take 1 tablet by mouth Every Morning., Disp: 90 tablet, Rfl: 1    LORazepam (Ativan) 0.5 MG tablet, Take 1 tablet by mouth Every 8 (Eight) Hours As Needed for Anxiety., Disp: 16 tablet, Rfl: 0    multivitamin with minerals tablet tablet, Take 1 tablet by mouth Daily., Disp: , Rfl:     omeprazole (priLOSEC) 20 MG capsule, Take 1 capsule by mouth Daily As Needed., Disp: , Rfl:     rosuvastatin (CRESTOR) 20 MG tablet, Take 1 tablet by mouth Daily., Disp: 90 tablet, Rfl: 3       Assessment & Plan   Healthy female exam.      1. Healthcare Maintenance:  2. Patient Counseling:  --Nutrition: Stressed importance of moderation in sodium/caffeine intake, saturated fat and cholesterol, caloric balance, sufficient intake of fresh fruits, vegetables, fiber, calcium and vit D  --Exercise: Recommended 30 minutes of exercise daily.  --Immunizations reviewed.  --Discussed benefits of screening colonoscopy.    Diagnoses and all orders for this visit:    Well woman exam (no gynecological exam)    Medicare annual wellness visit, subsequent    Heart palpitations    Atrial fibrillation, unspecified type  -     apixaban (ELIQUIS) 5 MG tablet tablet; Take 1 tablet by mouth Every 12 (Twelve) Hours for 360 days.    Essential hypertension  -     CBC & Differential  -     Comprehensive Metabolic Panel    New onset atrial fibrillation  -     dilTIAZem (CARDIZEM) 30 MG tablet; Take 1 tablet by mouth 2 (Two) Times a Day. 1 tablet 2 times daily    Hypothyroidism, unspecified type  -     levothyroxine (SYNTHROID, LEVOTHROID) 25 MCG tablet; Take 1 tablet by mouth Every Morning.  -     Thyroid Panel With TSH    Hyperlipidemia, unspecified hyperlipidemia type  -     rosuvastatin (CRESTOR) 20 MG tablet; Take 1 tablet by mouth Daily.  -     Lipid Panel With LDL / HDL Ratio    Healthcare maintenance    Anxiety  -     LORazepam (Ativan) 0.5 MG tablet;  Take 1 tablet by mouth Every 8 (Eight) Hours As Needed for Anxiety.    Panic attack  -     LORazepam (Ativan) 0.5 MG tablet; Take 1 tablet by mouth Every 8 (Eight) Hours As Needed for Anxiety.    Prediabetes  -     Hemoglobin A1c        No follow-ups on file.             Dictated utilizing Dragon Voice Recognition Software

## 2024-02-29 NOTE — PROGRESS NOTES
The labs were reviewed. Please inform patient that labs were normal. 10mg, INR 2.59  11/2- 10mg, INR 2.28  11/3- dcd on home dose: 15 mg (10 mg x 1.5) every Wed, Fri; 10 mg (10 mg x 1) all other days and INR was 2.42    DCD with Augmentin for 2 more days. Also dec entresto and amlodipine.   []   [x]       Emergency department visit  []   [x]       Other complaints     Clinical Outcomes:  Yes     No  []   [x]       Major bleeding event  []   [x]       Thromboembolic event    Duration of warfarin Therapy: Indefinite   INR Range:  2.0-3.0     INR today is 2.7  Take 15 mg on Fri and 10 mg all other days. Encouraged patient to maintain a consistent diet.   Recheck INR again in 4 weeks, 4/27    **consent form signed 2/1/2023    Referring physician is Dr. Dori Dominguez  INR (no units)   Date Value   11/03/2022 2.42 (H)   11/02/2022 2.28 (H)   11/01/2022 2.59 (H)   10/31/2022 3.02 (H)     INR,(POC) (no units)   Date Value   03/30/2023 2.7   03/01/2023 2.6   02/01/2023 2.3   01/04/2023 2.5   For Pharmacy Admin Tracking Only    Total # of Interventions Recommended: 0  Total # of Interventions Accepted: 0  Time Spent (min): 15

## 2024-03-12 ENCOUNTER — OFFICE VISIT (OUTPATIENT)
Dept: INTERNAL MEDICINE | Facility: CLINIC | Age: 74
End: 2024-03-12
Payer: MEDICARE

## 2024-03-12 ENCOUNTER — TELEPHONE (OUTPATIENT)
Dept: INTERNAL MEDICINE | Facility: CLINIC | Age: 74
End: 2024-03-12

## 2024-03-12 VITALS
BODY MASS INDEX: 24.11 KG/M2 | DIASTOLIC BLOOD PRESSURE: 68 MMHG | HEART RATE: 60 BPM | SYSTOLIC BLOOD PRESSURE: 110 MMHG | OXYGEN SATURATION: 97 % | WEIGHT: 136.1 LBS | TEMPERATURE: 97.9 F | HEIGHT: 63 IN

## 2024-03-12 DIAGNOSIS — N39.0 ACUTE UTI: Primary | ICD-10-CM

## 2024-03-12 LAB
BILIRUB BLD-MCNC: NEGATIVE MG/DL
CLARITY, POC: CLEAR
COLOR UR: YELLOW
EXPIRATION DATE: ABNORMAL
GLUCOSE UR STRIP-MCNC: NEGATIVE MG/DL
KETONES UR QL: NEGATIVE
LEUKOCYTE EST, POC: NEGATIVE
Lab: ABNORMAL
NITRITE UR-MCNC: NEGATIVE MG/ML
PH UR: 6 [PH] (ref 5–8)
PROT UR STRIP-MCNC: NEGATIVE MG/DL
RBC # UR STRIP: ABNORMAL /UL
SP GR UR: 1.01 (ref 1–1.03)
UROBILINOGEN UR QL: ABNORMAL

## 2024-03-12 RX ORDER — SULFAMETHOXAZOLE AND TRIMETHOPRIM 800; 160 MG/1; MG/1
1 TABLET ORAL 2 TIMES DAILY
Qty: 14 TABLET | Refills: 0 | Status: SHIPPED | OUTPATIENT
Start: 2024-03-12 | End: 2024-03-19

## 2024-03-12 NOTE — TELEPHONE ENCOUNTER
Caller: Penny Brown    Relationship: Self    Best call back number: [phone] (Home)     What medication are you requesting: UTI    What are your current symptoms: FREQUENCY, BURNING WHEN URINATING.    How long have you been experiencing symptoms:      Have you had these symptoms before:    [] Yes  [] No    Have you been treated for these symptoms before:   [] Yes  [] No    If a prescription is needed, what is your preferred pharmacy and phone number:  [id number] - [place], [place] - [address] AT [facility] [address]  - [phone]  - [phone] FX     Additional notes: PLEASE CONTACT PATIENT TO ADVISE.         THANKS

## 2024-03-17 NOTE — PROGRESS NOTES
"Chief Complaint  Urinary Tract Infection    Subjective        Penny Brown presents to [facility]  Urinary Tract Infection         Patient presented today as a visit with symptoms of having some dysuria.  Urinalysis did not really show any leuks or nitrites.  However she does have the symptoms of the dysuria.  She has had similar symptoms in the past for she has had UTIs.    Objective   Vital Signs:  /68   Pulse 60   Temp 97.9 °F (36.6 °C)   Ht 160 cm (62.99\")   Wt 61.7 kg (136 lb 1.6 oz)   SpO2 97%   BMI 24.12 kg/m²   Estimated body mass index is 24.12 kg/m² as calculated from the following:    Height as of this encounter: 160 cm (62.99\").    Weight as of this encounter: 61.7 kg (136 lb 1.6 oz).       BMI is within normal parameters. No other follow-up for BMI required.      Physical Exam  Vitals and nursing note reviewed.   Constitutional:       Appearance: She is well-developed.   HENT:      Head: Normocephalic and atraumatic.   Musculoskeletal:      Cervical back: Normal range of motion and neck supple.   Neurological:      Mental Status: She is alert and oriented to person, place, and time.   Psychiatric:         Behavior: Behavior normal.        Result Review :                     Assessment and Plan     Diagnoses and all orders for this visit:    1. Acute UTI (Primary)  -     sulfamethoxazole-trimethoprim (BACTRIM DS,SEPTRA DS) 800-160 MG per tablet; Take 1 tablet by mouth 2 (Two) Times a Day for 7 days.  Dispense: 14 tablet; Refill: 0  -     Urinalysis With Microscopic - Urine, Clean Catch  -     POC Urinalysis Dipstick, Automated    Will treat empirically with Bactrim DS see if symptoms resolve.  If it does not resolve then she needs to have further evaluation.         Follow Up     No follow-ups on file.  Patient was given instructions and counseling regarding her condition or for health maintenance advice. Please see specific information pulled into the AVS if " appropriate.

## 2024-04-10 ENCOUNTER — OFFICE VISIT (OUTPATIENT)
Dept: INTERNAL MEDICINE | Facility: CLINIC | Age: 74
End: 2024-04-10
Payer: MEDICARE

## 2024-04-10 VITALS
RESPIRATION RATE: 16 BRPM | HEIGHT: 63 IN | BODY MASS INDEX: 24.8 KG/M2 | OXYGEN SATURATION: 96 % | HEART RATE: 75 BPM | WEIGHT: 140 LBS | TEMPERATURE: 97.2 F | SYSTOLIC BLOOD PRESSURE: 128 MMHG | DIASTOLIC BLOOD PRESSURE: 74 MMHG

## 2024-04-10 DIAGNOSIS — N30.00 ACUTE CYSTITIS WITHOUT HEMATURIA: Primary | ICD-10-CM

## 2024-04-10 PROCEDURE — 1160F RVW MEDS BY RX/DR IN RCRD: CPT | Performed by: FAMILY MEDICINE

## 2024-04-10 PROCEDURE — 1159F MED LIST DOCD IN RCRD: CPT | Performed by: FAMILY MEDICINE

## 2024-04-10 PROCEDURE — 99214 OFFICE O/P EST MOD 30 MIN: CPT | Performed by: FAMILY MEDICINE

## 2024-04-10 RX ORDER — SULFAMETHOXAZOLE AND TRIMETHOPRIM 800; 160 MG/1; MG/1
1 TABLET ORAL 2 TIMES DAILY
Qty: 14 TABLET | Refills: 0 | Status: SHIPPED | OUTPATIENT
Start: 2024-04-10 | End: 2024-04-17

## 2024-04-10 NOTE — PROGRESS NOTES
"Chief Complaint  Follow-up (On UTI )    Subjective          Penny Brown presents to [facility]  History of Present Illness  The patient is a 73-year-old female who presents for evaluation of UTI.    During her previous visit, the patient presented with symptoms of a urinary tract infection (UTI). However, a urinalysis did not reveal any infection. Subsequently, she was prescribed Bactrim, which unfortunately did not yield any noticeable improvement. Despite completing the antibiotic course, her symptoms recurred the following day. Her symptoms resolved after a few weeks. However, last Friday, she experienced the most severe UTI-like symptoms, characterized by severe dysuria and chills that persisted throughout the day. By Saturday morning, her symptoms had completely resolved. Currently, she is experiencing urinary urgency and frequency, a symptom that has become more prevalent over the past few years. She has not consulted a urologist. Her gynecologist, who was previously managing her UTI symptoms, has since retired.    Objective   Vital Signs:   /74   Pulse 75   Temp 97.2 °F (36.2 °C) (Temporal)   Resp 16   Ht 160 cm (62.99\")   Wt 63.5 kg (140 lb)   SpO2 96%   BMI 24.81 kg/m²     Physical Exam  Vitals and nursing note reviewed.   Constitutional:       Appearance: She is well-developed.   HENT:      Head: Normocephalic and atraumatic.   Musculoskeletal:      Cervical back: Normal range of motion and neck supple.   Neurological:      Mental Status: She is alert and oriented to person, place, and time.   Psychiatric:         Behavior: Behavior normal.         Physical Exam       Result Review :                 Assessment and Plan    Diagnoses and all orders for this visit:    1. Acute cystitis without hematuria (Primary)  -     Urinalysis With Microscopic - Urine, Clean Catch  -     Ambulatory Referral to Urology  -     sulfamethoxazole-trimethoprim (BACTRIM DS,SEPTRA " DS) 800-160 MG per tablet; Take 1 tablet by mouth 2 (Two) Times a Day for 7 days.  Dispense: 14 tablet; Refill: 0      Assessment & Plan  1. Urinary tract infection.  A urinalysis will be conducted today. A referral to a urologist will be initiated. Should the urinalysis confirm a urinary tract infection (UTI), the initiation of antibiotic therapy will be initiated.    Follow Up   No follow-ups on file.  Patient was given instructions and counseling regarding her condition or for health maintenance advice. Please see specific information pulled into the AVS if appropriate.           Patient or patient representative verbalized consent for the use of Ambient Listening during the visit with  Julian Garg MD for chart documentation. 4/10/2024  13:58 EDT

## 2024-04-11 LAB
APPEARANCE UR: CLEAR
BACTERIA #/AREA URNS HPF: NORMAL /HPF
BILIRUB UR QL STRIP: NEGATIVE
CASTS URNS MICRO: NORMAL
COLOR UR: YELLOW
EPI CELLS #/AREA URNS HPF: NORMAL /HPF
GLUCOSE UR QL STRIP: NEGATIVE
HGB UR QL STRIP: ABNORMAL
KETONES UR QL STRIP: NEGATIVE
LEUKOCYTE ESTERASE UR QL STRIP: NEGATIVE
NITRITE UR QL STRIP: NEGATIVE
PH UR STRIP: 6 [PH] (ref 5–8)
PROT UR QL STRIP: NEGATIVE
RBC #/AREA URNS HPF: NORMAL /HPF
SP GR UR STRIP: 1.01 (ref 1–1.03)
UROBILINOGEN UR STRIP-MCNC: ABNORMAL MG/DL
WBC #/AREA URNS HPF: NORMAL /HPF

## 2024-04-15 NOTE — PROGRESS NOTES
Please inform the patient of the following abnormal results. Continues to have blood in the urine. Needs to follow up with urology.

## 2024-04-29 ENCOUNTER — HOSPITAL ENCOUNTER (EMERGENCY)
Facility: HOSPITAL | Age: 74
Discharge: HOME | End: 2024-04-29
Attending: EMERGENCY MEDICINE | Admitting: EMERGENCY MEDICINE
Payer: MEDICARE

## 2024-04-29 VITALS
HEART RATE: 67 BPM | BODY MASS INDEX: 24.1 KG/M2 | OXYGEN SATURATION: 94 % | WEIGHT: 136 LBS | HEIGHT: 63 IN | RESPIRATION RATE: 16 BRPM | SYSTOLIC BLOOD PRESSURE: 127 MMHG | DIASTOLIC BLOOD PRESSURE: 74 MMHG | TEMPERATURE: 98.8 F

## 2024-04-29 DIAGNOSIS — R00.2 PALPITATIONS: Primary | ICD-10-CM

## 2024-04-29 DIAGNOSIS — E87.6 HYPOKALEMIA: ICD-10-CM

## 2024-04-29 LAB
ALBUMIN SERPL-MCNC: 4 G/DL (ref 3.5–5.2)
ALBUMIN/GLOB SERPL: 1.6 G/DL
ALP SERPL-CCNC: 55 U/L (ref 39–117)
ALT SERPL W P-5'-P-CCNC: 18 U/L (ref 1–33)
ANION GAP SERPL CALCULATED.3IONS-SCNC: 10.3 MMOL/L (ref 5–15)
AST SERPL-CCNC: 34 U/L (ref 1–32)
BASOPHILS # BLD AUTO: 0.01 10*3/MM3 (ref 0–0.2)
BASOPHILS NFR BLD AUTO: 0.2 % (ref 0–1.5)
BILIRUB SERPL-MCNC: 0.6 MG/DL (ref 0–1.2)
BUN SERPL-MCNC: 17 MG/DL (ref 8–23)
BUN/CREAT SERPL: 18.7 (ref 7–25)
CALCIUM SPEC-SCNC: 8.9 MG/DL (ref 8.6–10.5)
CHLORIDE SERPL-SCNC: 105 MMOL/L (ref 98–107)
CO2 SERPL-SCNC: 25.7 MMOL/L (ref 22–29)
CREAT SERPL-MCNC: 0.91 MG/DL (ref 0.57–1)
DEPRECATED RDW RBC AUTO: 41.3 FL (ref 37–54)
EGFRCR SERPLBLD CKD-EPI 2021: 65.9 ML/MIN/1.73
EOSINOPHIL # BLD AUTO: 0.08 10*3/MM3 (ref 0–0.4)
EOSINOPHIL NFR BLD AUTO: 1.8 % (ref 0.3–6.2)
ERYTHROCYTE [DISTWIDTH] IN BLOOD BY AUTOMATED COUNT: 12.3 % (ref 12.3–15.4)
GLOBULIN UR ELPH-MCNC: 2.5 GM/DL
GLUCOSE SERPL-MCNC: 96 MG/DL (ref 65–99)
HCT VFR BLD AUTO: 36.4 % (ref 34–46.6)
HGB BLD-MCNC: 11.9 G/DL (ref 12–15.9)
HOLD SPECIMEN: NORMAL
HOLD SPECIMEN: NORMAL
IMM GRANULOCYTES # BLD AUTO: 0.01 10*3/MM3 (ref 0–0.05)
IMM GRANULOCYTES NFR BLD AUTO: 0.2 % (ref 0–0.5)
LYMPHOCYTES # BLD AUTO: 1.57 10*3/MM3 (ref 0.7–3.1)
LYMPHOCYTES NFR BLD AUTO: 35.9 % (ref 19.6–45.3)
MAGNESIUM SERPL-MCNC: 2 MG/DL (ref 1.6–2.4)
MCH RBC QN AUTO: 30.1 PG (ref 26.6–33)
MCHC RBC AUTO-ENTMCNC: 32.7 G/DL (ref 31.5–35.7)
MCV RBC AUTO: 92.2 FL (ref 79–97)
MONOCYTES # BLD AUTO: 0.47 10*3/MM3 (ref 0.1–0.9)
MONOCYTES NFR BLD AUTO: 10.8 % (ref 5–12)
NEUTROPHILS NFR BLD AUTO: 2.23 10*3/MM3 (ref 1.7–7)
NEUTROPHILS NFR BLD AUTO: 51.1 % (ref 42.7–76)
NRBC BLD AUTO-RTO: 0 /100 WBC (ref 0–0.2)
PLATELET # BLD AUTO: 174 10*3/MM3 (ref 140–450)
PMV BLD AUTO: 9.3 FL (ref 6–12)
POTASSIUM SERPL-SCNC: 3.1 MMOL/L (ref 3.5–5.2)
PROT SERPL-MCNC: 6.5 G/DL (ref 6–8.5)
QT INTERVAL: 439 MS
QTC INTERVAL: 439 MS
RBC # BLD AUTO: 3.95 10*6/MM3 (ref 3.77–5.28)
SODIUM SERPL-SCNC: 141 MMOL/L (ref 136–145)
TROPONIN T SERPL HS-MCNC: 11 NG/L
WBC NRBC COR # BLD AUTO: 4.37 10*3/MM3 (ref 3.4–10.8)
WHOLE BLOOD HOLD COAG: NORMAL
WHOLE BLOOD HOLD SPECIMEN: NORMAL

## 2024-04-29 PROCEDURE — 85025 COMPLETE CBC W/AUTO DIFF WBC: CPT | Performed by: EMERGENCY MEDICINE

## 2024-04-29 PROCEDURE — 83735 ASSAY OF MAGNESIUM: CPT | Performed by: EMERGENCY MEDICINE

## 2024-04-29 PROCEDURE — 80053 COMPREHEN METABOLIC PANEL: CPT | Performed by: EMERGENCY MEDICINE

## 2024-04-29 PROCEDURE — 99283 EMERGENCY DEPT VISIT LOW MDM: CPT

## 2024-04-29 PROCEDURE — 84484 ASSAY OF TROPONIN QUANT: CPT | Performed by: EMERGENCY MEDICINE

## 2024-04-29 PROCEDURE — 93010 ELECTROCARDIOGRAM REPORT: CPT | Performed by: INTERNAL MEDICINE

## 2024-04-29 PROCEDURE — 93005 ELECTROCARDIOGRAM TRACING: CPT

## 2024-04-29 PROCEDURE — 93005 ELECTROCARDIOGRAM TRACING: CPT | Performed by: EMERGENCY MEDICINE

## 2024-04-29 RX ORDER — SODIUM CHLORIDE 0.9 % (FLUSH) 0.9 %
10 SYRINGE (ML) INJECTION AS NEEDED
Status: DISCONTINUED | OUTPATIENT
Start: 2024-04-29 | End: 2024-04-29 | Stop reason: HOSPADM

## 2024-04-29 RX ORDER — POTASSIUM CHLORIDE 750 MG/1
40 TABLET, FILM COATED, EXTENDED RELEASE ORAL ONCE
Status: COMPLETED | OUTPATIENT
Start: 2024-04-29 | End: 2024-04-29

## 2024-04-29 RX ADMIN — POTASSIUM CHLORIDE 40 MEQ: 750 TABLET, EXTENDED RELEASE ORAL at 06:24

## 2024-04-29 NOTE — ED TRIAGE NOTES
Pt bib EMS from home; pt woke up around 4 am went to bathroom came back to bed and had an episode of dizziness, racing heart; pt 120 mg of Cardizem (pt states it was the dose she was previously prescribed)       Per ems - negative orthostatic vitals

## 2024-04-29 NOTE — ED PROVIDER NOTES
EMERGENCY DEPARTMENT ENCOUNTER    Room Number:  12/12  PCP: Julian Garg MD  Patient Care Team:  Julian Garg MD as PCP - General (Family Medicine)  Mario Goel MD as Consulting Physician (Gastroenterology)   Independent Historians: Patient    HPI:  Chief Complaint: Palpitations    A complete HPI/ROS/PMH/PSH/SH/FH are unobtainable due to: None    Chronic or social conditions impacting patient care (Social Determinants of Health): None  (Financial Resource Strain / Food Insecurity / Transportation Needs / Physical Activity / Stress / Social Connections / Intimate Partner Violence / Housing Stability)    Context: Penny Brown is a 73 y.o. female who presents to the ED c/o acute onset of dyspnea and palpitations.  Patient that she has history of A-fib but this felt a little bit different.  Patient decided to take a tab of 120 mg diltiazem that she has leftover.  Patient currently takes a lower dose of diltiazem.  Patient states that about 5 or 10 minutes later her heart rate normalized.  At this time patient has no acute complaints.    Patient is anticoagulated.    Review of prior external notes (non-ED) -and- Review of prior external test results outside of this encounter: I reviewed patient's cardiology note from 2/6/2024    Prescription drug monitoring program review:         PAST MEDICAL HISTORY  Active Ambulatory Problems     Diagnosis Date Noted    Arcus senilis of both corneas 01/18/2016    Cystocele 02/17/2015    Epiphora due to insufficient drainage of right side 01/18/2016    Incomplete emptying of bladder 02/17/2015    OAB (overactive bladder) 02/13/2015    Senile cataracts of both eyes 01/18/2016    Uterovaginal prolapse, incomplete 02/28/2015    Hypothyroidism 07/16/2018    Mixed hyperlipidemia 07/16/2018    Prediabetes 07/16/2018    Leg cramps 07/16/2018    Vertigo 12/04/2018    Hypotension due to hypovolemia 11/24/2020    Anxiety 07/06/2023    Paroxysmal atrial fibrillation 09/06/2023     Dysfunction of right eustachian tube 10/03/2023     Resolved Ambulatory Problems     Diagnosis Date Noted    New onset atrial fibrillation 07/01/2023     Past Medical History:   Diagnosis Date    Dizziness     Fibrocystic breast     GERD (gastroesophageal reflux disease)     Hyperlipidemia     PAD (peripheral artery disease)     Seizures     Syncopal episodes          PAST SURGICAL HISTORY  Past Surgical History:   Procedure Laterality Date    BLADDER SURGERY  2015    bladder lift    CERVICAL CONE BIOPSY  [id number]    COLONOSCOPY  02/01/2014    EYE SURGERY Right 01/19/2016    TEAR DUCT SURGERY/PARTIAL REMOVAL OF NASAL SINUS    EYE SURGERY Left 05/13/2011    TEAR DUCT SURGERY/DACRYOCYSTIRTHINOSTOMY AND REMOVAL OF ETHMOID SINUS    HYSTERECTOMY  03/25/2015    UTEROSACRAL SUSPENSION AND HYSTERECTOMY    OOPHORECTOMY      PAP SMEAR  01/18/2018    VOCAL CORD BIOPSY  08/12/2009         FAMILY HISTORY  Family History   Problem Relation Age of Onset    Diabetes Maternal Grandmother     Heart failure Mother     Thyroid disease Mother     Lung disease Brother     Alcohol abuse Maternal Aunt     Cancer Maternal Aunt     Breast cancer Maternal Aunt     Alcohol abuse Maternal Uncle     Cancer Maternal Uncle     Diabetes Maternal Uncle     Ovarian cancer Neg Hx          SOCIAL HISTORY  Social History     Socioeconomic History    Marital status:     Number of children: 1   Tobacco Use    Smoking status: Never    Smokeless tobacco: Never   Vaping Use    Vaping status: Never Used   Substance and Sexual Activity    Alcohol use: Yes     Alcohol/week: 1.0 - 2.0 standard drink of alcohol     Types: 1 - 2 Shots of liquor per week     Comment: 2-4/MONTH    Drug use: No    Sexual activity: Never         ALLERGIES  Other, Ct contrast, and Prednisone        REVIEW OF SYSTEMS  Review of Systems  Included in HPI  All systems reviewed and negative except for those discussed in HPI.      PHYSICAL EXAM    I have reviewed the triage vital  signs and nursing notes.    ED Triage Vitals [04/29/24 0505]   Temp Heart Rate Resp BP SpO2   98.8 °F (37.1 °C) 64 16 106/70 97 %      Temp src Heart Rate Source Patient Position BP Location FiO2 (%)   Tympanic Monitor Lying Right arm --       Physical Exam  GENERAL: alert, no acute distress  SKIN: Warm, dry  HENT: Normocephalic, atraumatic  EYES: no scleral icterus  CV: regular rhythm, regular rate  RESPIRATORY: normal effort, lungs clear  ABDOMEN: soft, nontender, nondistended  MUSCULOSKELETAL: no deformity  NEURO: alert, moves all extremities, follows commands                                                               LAB RESULTS  Recent Results (from the past 24 hour(s))   Comprehensive Metabolic Panel    Collection Time: 04/29/24  5:26 AM    Specimen: Blood   Result Value Ref Range    Glucose 96 65 - 99 mg/dL    BUN 17 8 - 23 mg/dL    Creatinine 0.91 0.57 - 1.00 mg/dL    Sodium 141 136 - 145 mmol/L    Potassium 3.1 (L) 3.5 - 5.2 mmol/L    Chloride 105 98 - 107 mmol/L    CO2 25.7 22.0 - 29.0 mmol/L    Calcium 8.9 8.6 - 10.5 mg/dL    Total Protein 6.5 6.0 - 8.5 g/dL    Albumin 4.0 3.5 - 5.2 g/dL    ALT (SGPT) 18 1 - 33 U/L    AST (SGOT) 34 (H) 1 - 32 U/L    Alkaline Phosphatase 55 39 - 117 U/L    Total Bilirubin 0.6 0.0 - 1.2 mg/dL    Globulin 2.5 gm/dL    A/G Ratio 1.6 g/dL    BUN/Creatinine Ratio 18.7 7.0 - 25.0    Anion Gap 10.3 5.0 - 15.0 mmol/L    eGFR 65.9 >60.0 mL/min/1.73   Magnesium    Collection Time: 04/29/24  5:26 AM    Specimen: Blood   Result Value Ref Range    Magnesium 2.0 1.6 - 2.4 mg/dL   Single High Sensitivity Troponin T    Collection Time: 04/29/24  5:26 AM    Specimen: Blood   Result Value Ref Range    HS Troponin T 11 <14 ng/L   Green Top (Gel)    Collection Time: 04/29/24  5:26 AM   Result Value Ref Range    Extra Tube Hold for add-ons.    Lavender Top    Collection Time: 04/29/24  5:26 AM   Result Value Ref Range    Extra Tube hold for add-on    Gold Top - SST    Collection Time:  04/29/24  5:26 AM   Result Value Ref Range    Extra Tube Hold for add-ons.    Light Blue Top    Collection Time: 04/29/24  5:26 AM   Result Value Ref Range    Extra Tube Hold for add-ons.    CBC Auto Differential    Collection Time: 04/29/24  5:26 AM    Specimen: Blood   Result Value Ref Range    WBC 4.37 3.40 - 10.80 10*3/mm3    RBC 3.95 3.77 - 5.28 10*6/mm3    Hemoglobin 11.9 (L) 12.0 - 15.9 g/dL    Hematocrit 36.4 34.0 - 46.6 %    MCV 92.2 79.0 - 97.0 fL    MCH 30.1 26.6 - 33.0 pg    MCHC 32.7 31.5 - 35.7 g/dL    RDW 12.3 12.3 - 15.4 %    RDW-SD 41.3 37.0 - 54.0 fl    MPV 9.3 6.0 - 12.0 fL    Platelets 174 140 - 450 10*3/mm3    Neutrophil % 51.1 42.7 - 76.0 %    Lymphocyte % 35.9 19.6 - 45.3 %    Monocyte % 10.8 5.0 - 12.0 %    Eosinophil % 1.8 0.3 - 6.2 %    Basophil % 0.2 0.0 - 1.5 %    Immature Grans % 0.2 0.0 - 0.5 %    Neutrophils, Absolute 2.23 1.70 - 7.00 10*3/mm3    Lymphocytes, Absolute 1.57 0.70 - 3.10 10*3/mm3    Monocytes, Absolute 0.47 0.10 - 0.90 10*3/mm3    Eosinophils, Absolute 0.08 0.00 - 0.40 10*3/mm3    Basophils, Absolute 0.01 0.00 - 0.20 10*3/mm3    Immature Grans, Absolute 0.01 0.00 - 0.05 10*3/mm3    nRBC 0.0 0.0 - 0.2 /100 WBC   ECG 12 Lead ED Triage Standing Order; Syncope    Collection Time: 04/29/24  5:38 AM   Result Value Ref Range    QT Interval 439 ms    QTC Interval 439 ms       I ordered the above labs and independently reviewed the results.        RADIOLOGY  No Radiology Exams Resulted Within Past 24 Hours    I ordered the above noted radiological studies. Reviewed by me and discussed with radiologist.  See dictation for official radiology interpretation.      PROCEDURES    Procedures      MEDICATIONS GIVEN IN ER    Medications   sodium chloride 0.9 % flush 10 mL (has no administration in time range)   potassium chloride (K-DUR,KLOR-CON) ER tablet 40 mEq (40 mEq Oral Given 4/29/24 0624)         ORDERS PLACED DURING THIS VISIT:  Orders Placed This Encounter   Procedures    [place]  Draw    Comprehensive Metabolic Panel    Magnesium    Single High Sensitivity Troponin T    CBC Auto Differential    NPO Diet NPO Type: Strict NPO    Undress & Gown    Continuous Pulse Oximetry    Vital Signs    Orthostatic Blood Pressure    Cardiology (on-call MD unless specified)    Oxygen Therapy- Nasal Cannula; Titrate 1-6 LPM Per SpO2; 90 - 95%    ECG 12 Lead ED Triage Standing Order; Syncope    Insert Peripheral IV    CBC & Differential    Green Top (Gel)    Lavender Top    Gold Top - SST    Light Blue Top         PROGRESS, DATA ANALYSIS, CONSULTS, AND MEDICAL DECISION MAKING    All labs have been independently interpreted by me.  All radiology studies have been reviewed by me and discussed with radiologist dictating the report.   EKG's independently viewed and interpreted by me.  Discussion below represents my analysis of pertinent findings related to patient's condition, differential diagnosis, treatment plan and final disposition.    My differential diagnosis for palpitations includes but is not limited to    Arrhythmias  Atrial fibrillation/flutter  Bradycardia caused by advanced arteriovenous  block or sinus node dysfunction  Bradycardia-tachycardia syndrome (sick sinus syndrome)  Multifocal atrial tachycardia  Premature supraventricular or ventricular contractions  Sinus tachycardia or arrhythmia  Supraventricular tachycardia  Ventricular tachycardia  Nancy-Parkinson-White syndrome     Psychiatric causes  Anxiety disorder  Panic attacks  Drugs and medications  Alcohol  Caffeine  Certain prescription and over-the-counter agents (e.g., digitalis, phenothiazine, theophylline, beta agonists)  Street drugs (e.g., cocaine)  Tobacco    Nonarrhythmic cardiac causes  Atrial or ventricular septal defect  Cardiomyopathy  Congenital heart disease  Congestive heart failure  Mitral valve prolapse  Pacemaker-mediated tachycardia  Pericarditis  Valvular disease (e.g., aortic insufficiency, stenosis)    Extracardiac  causes  Anemia  Electrolyte imbalance  Fever  Hyperthyroidism  Hypoglycemia  Hypovolemia  Pheochromocytoma  Pulmonary disease  Vasovagal syndrome       Clinical Scores:              ED Course as of 04/29/24 0639   Mon Apr 29, 2024   0635 Discussed with Dr. Grace -patient will follow-up in the office as outpatient.  He states would increase the diltiazem.  Patient however would prefer not to.  I suggested she speak with her cardiologist when she follows up.  Will also have her hold her diltiazem for 24 hours since she took an extended release product. [TJ]   0639 EKG          EKG time: 0538  Rhythm/Rate: Sinus rhythm rate 60  P waves and KS: Normal  QRS, axis: Narrow regular  ST and T waves: Normal limits    Interpreted Contemporaneously by me, independently viewed [TJ]      ED Course User Index  [TJ] Sukhdeep Robertson MD             PPE: The patient wore a mask and I wore an N95 mask throughout the entire patient encounter.       AS OF 06:39 EDT VITALS:    BP - 127/74  HR - 67  TEMP - 98.8 °F (37.1 °C) (Tympanic)  O2 SATS - 94%        DIAGNOSIS  Final diagnoses:   Palpitations   Hypokalemia         DISPOSITION  ED Disposition       ED Disposition   Discharge    Condition   Stable    Comment   --                  Note Disclaimer: At [facility], we believe that sharing information builds trust and better relationships. You are receiving this note because you recently visited [facility]. It is possible you will see health information before a provider has talked with you about it. This kind of information can be easy to misunderstand. To help you fully understand what it means for your health, we urge you to discuss this note with your provider.         Sukhdeep Robertson MD  04/29/24 0639

## 2024-05-01 DIAGNOSIS — E03.9 HYPOTHYROIDISM, UNSPECIFIED TYPE: ICD-10-CM

## 2024-05-02 RX ORDER — LEVOTHYROXINE SODIUM 0.03 MG/1
25 TABLET ORAL
Qty: 100 TABLET | Refills: 2 | Status: SHIPPED | OUTPATIENT
Start: 2024-05-02

## 2024-05-22 ENCOUNTER — HOSPITAL ENCOUNTER (OUTPATIENT)
Facility: HOSPITAL | Age: 74
Setting detail: OBSERVATION
Discharge: HOME | End: 2024-05-24
Attending: STUDENT IN AN ORGANIZED HEALTH CARE EDUCATION/TRAINING PROGRAM | Admitting: SURGERY
Payer: MEDICARE

## 2024-05-22 ENCOUNTER — APPOINTMENT (OUTPATIENT)
Dept: CT IMAGING | Facility: HOSPITAL | Age: 74
End: 2024-05-22
Payer: MEDICARE

## 2024-05-22 DIAGNOSIS — K57.92 DIVERTICULITIS: Primary | ICD-10-CM

## 2024-05-22 LAB
ALBUMIN SERPL-MCNC: 4.6 G/DL (ref 3.5–5.2)
ALBUMIN/GLOB SERPL: 1.6 G/DL
ALP SERPL-CCNC: 67 U/L (ref 39–117)
ALT SERPL W P-5'-P-CCNC: 18 U/L (ref 1–33)
ANION GAP SERPL CALCULATED.3IONS-SCNC: 12 MMOL/L (ref 5–15)
AST SERPL-CCNC: 27 U/L (ref 1–32)
BACTERIA UR QL AUTO: ABNORMAL /HPF
BASOPHILS # BLD AUTO: 0.01 10*3/MM3 (ref 0–0.2)
BASOPHILS NFR BLD AUTO: 0.1 % (ref 0–1.5)
BILIRUB SERPL-MCNC: 0.9 MG/DL (ref 0–1.2)
BILIRUB UR QL STRIP: NEGATIVE
BUN SERPL-MCNC: 10 MG/DL (ref 8–23)
BUN/CREAT SERPL: 12.3 (ref 7–25)
CALCIUM SPEC-SCNC: 9.5 MG/DL (ref 8.6–10.5)
CHLORIDE SERPL-SCNC: 102 MMOL/L (ref 98–107)
CLARITY UR: CLEAR
CO2 SERPL-SCNC: 26 MMOL/L (ref 22–29)
COLOR UR: YELLOW
CREAT SERPL-MCNC: 0.81 MG/DL (ref 0.57–1)
D-LACTATE SERPL-SCNC: 0.9 MMOL/L (ref 0.5–2)
DEPRECATED RDW RBC AUTO: 40.5 FL (ref 37–54)
EGFRCR SERPLBLD CKD-EPI 2021: 75.8 ML/MIN/1.73
EOSINOPHIL # BLD AUTO: 0.02 10*3/MM3 (ref 0–0.4)
EOSINOPHIL NFR BLD AUTO: 0.2 % (ref 0.3–6.2)
ERYTHROCYTE [DISTWIDTH] IN BLOOD BY AUTOMATED COUNT: 11.9 % (ref 12.3–15.4)
GLOBULIN UR ELPH-MCNC: 2.8 GM/DL
GLUCOSE SERPL-MCNC: 112 MG/DL (ref 65–99)
GLUCOSE UR STRIP-MCNC: NEGATIVE MG/DL
HCT VFR BLD AUTO: 42.3 % (ref 34–46.6)
HGB BLD-MCNC: 14.3 G/DL (ref 12–15.9)
HGB UR QL STRIP.AUTO: ABNORMAL
HOLD SPECIMEN: NORMAL
HYALINE CASTS UR QL AUTO: ABNORMAL /LPF
IMM GRANULOCYTES # BLD AUTO: 0.03 10*3/MM3 (ref 0–0.05)
IMM GRANULOCYTES NFR BLD AUTO: 0.3 % (ref 0–0.5)
KETONES UR QL STRIP: NEGATIVE
LEUKOCYTE ESTERASE UR QL STRIP.AUTO: ABNORMAL
LIPASE SERPL-CCNC: 25 U/L (ref 13–60)
LYMPHOCYTES # BLD AUTO: 0.95 10*3/MM3 (ref 0.7–3.1)
LYMPHOCYTES NFR BLD AUTO: 10.3 % (ref 19.6–45.3)
MCH RBC QN AUTO: 31.3 PG (ref 26.6–33)
MCHC RBC AUTO-ENTMCNC: 33.8 G/DL (ref 31.5–35.7)
MCV RBC AUTO: 92.6 FL (ref 79–97)
MONOCYTES # BLD AUTO: 0.88 10*3/MM3 (ref 0.1–0.9)
MONOCYTES NFR BLD AUTO: 9.5 % (ref 5–12)
NEUTROPHILS NFR BLD AUTO: 7.34 10*3/MM3 (ref 1.7–7)
NEUTROPHILS NFR BLD AUTO: 79.6 % (ref 42.7–76)
NITRITE UR QL STRIP: NEGATIVE
NRBC BLD AUTO-RTO: 0 /100 WBC (ref 0–0.2)
PH UR STRIP.AUTO: 7 [PH] (ref 5–8)
PLATELET # BLD AUTO: 193 10*3/MM3 (ref 140–450)
PMV BLD AUTO: 9.5 FL (ref 6–12)
POTASSIUM SERPL-SCNC: 3.7 MMOL/L (ref 3.5–5.2)
PROT SERPL-MCNC: 7.4 G/DL (ref 6–8.5)
PROT UR QL STRIP: NEGATIVE
RBC # BLD AUTO: 4.57 10*6/MM3 (ref 3.77–5.28)
RBC # UR STRIP: ABNORMAL /HPF
REF LAB TEST METHOD: ABNORMAL
SODIUM SERPL-SCNC: 140 MMOL/L (ref 136–145)
SP GR UR STRIP: 1.01 (ref 1–1.03)
SQUAMOUS #/AREA URNS HPF: ABNORMAL /HPF
UROBILINOGEN UR QL STRIP: ABNORMAL
WBC # UR STRIP: ABNORMAL /HPF
WBC NRBC COR # BLD AUTO: 9.23 10*3/MM3 (ref 3.4–10.8)
WHOLE BLOOD HOLD COAG: NORMAL
WHOLE BLOOD HOLD SPECIMEN: NORMAL

## 2024-05-22 PROCEDURE — 25010000002 PIPERACILLIN SOD-TAZOBACTAM PER 1 G: Performed by: STUDENT IN AN ORGANIZED HEALTH CARE EDUCATION/TRAINING PROGRAM

## 2024-05-22 PROCEDURE — 25010000002 KETOROLAC TROMETHAMINE PER 15 MG: Performed by: STUDENT IN AN ORGANIZED HEALTH CARE EDUCATION/TRAINING PROGRAM

## 2024-05-22 PROCEDURE — 85025 COMPLETE CBC W/AUTO DIFF WBC: CPT

## 2024-05-22 PROCEDURE — 74176 CT ABD & PELVIS W/O CONTRAST: CPT

## 2024-05-22 PROCEDURE — 99285 EMERGENCY DEPT VISIT HI MDM: CPT

## 2024-05-22 PROCEDURE — G0378 HOSPITAL OBSERVATION PER HR: HCPCS

## 2024-05-22 PROCEDURE — 96375 TX/PRO/DX INJ NEW DRUG ADDON: CPT

## 2024-05-22 PROCEDURE — 80053 COMPREHEN METABOLIC PANEL: CPT

## 2024-05-22 PROCEDURE — 83690 ASSAY OF LIPASE: CPT

## 2024-05-22 PROCEDURE — 25010000002 PIPERACILLIN SOD-TAZOBACTAM PER 1 G: Performed by: EMERGENCY MEDICINE

## 2024-05-22 PROCEDURE — 87040 BLOOD CULTURE FOR BACTERIA: CPT | Performed by: STUDENT IN AN ORGANIZED HEALTH CARE EDUCATION/TRAINING PROGRAM

## 2024-05-22 PROCEDURE — 83605 ASSAY OF LACTIC ACID: CPT

## 2024-05-22 PROCEDURE — 81001 URINALYSIS AUTO W/SCOPE: CPT

## 2024-05-22 PROCEDURE — 96361 HYDRATE IV INFUSION ADD-ON: CPT

## 2024-05-22 PROCEDURE — 36415 COLL VENOUS BLD VENIPUNCTURE: CPT

## 2024-05-22 PROCEDURE — 25810000003 LACTATED RINGERS PER 1000 ML: Performed by: EMERGENCY MEDICINE

## 2024-05-22 PROCEDURE — 96376 TX/PRO/DX INJ SAME DRUG ADON: CPT

## 2024-05-22 PROCEDURE — 96365 THER/PROPH/DIAG IV INF INIT: CPT

## 2024-05-22 RX ORDER — LORAZEPAM 0.5 MG/1
0.5 TABLET ORAL EVERY 8 HOURS PRN
Status: DISCONTINUED | OUTPATIENT
Start: 2024-05-22 | End: 2024-05-23

## 2024-05-22 RX ORDER — PANTOPRAZOLE SODIUM 40 MG/1
40 TABLET, DELAYED RELEASE ORAL
Status: DISCONTINUED | OUTPATIENT
Start: 2024-05-23 | End: 2024-05-24 | Stop reason: HOSPADM

## 2024-05-22 RX ORDER — SODIUM CHLORIDE 0.9 % (FLUSH) 0.9 %
10 SYRINGE (ML) INJECTION AS NEEDED
Status: DISCONTINUED | OUTPATIENT
Start: 2024-05-22 | End: 2024-05-24 | Stop reason: HOSPADM

## 2024-05-22 RX ORDER — SODIUM CHLORIDE 9 MG/ML
40 INJECTION, SOLUTION INTRAVENOUS AS NEEDED
Status: DISCONTINUED | OUTPATIENT
Start: 2024-05-22 | End: 2024-05-24 | Stop reason: HOSPADM

## 2024-05-22 RX ORDER — KETOROLAC TROMETHAMINE 15 MG/ML
15 INJECTION, SOLUTION INTRAMUSCULAR; INTRAVENOUS ONCE
Status: COMPLETED | OUTPATIENT
Start: 2024-05-22 | End: 2024-05-22

## 2024-05-22 RX ORDER — SODIUM CHLORIDE, SODIUM LACTATE, POTASSIUM CHLORIDE, CALCIUM CHLORIDE 600; 310; 30; 20 MG/100ML; MG/100ML; MG/100ML; MG/100ML
100 INJECTION, SOLUTION INTRAVENOUS CONTINUOUS
Status: DISCONTINUED | OUTPATIENT
Start: 2024-05-22 | End: 2024-05-23

## 2024-05-22 RX ORDER — POLYETHYLENE GLYCOL 3350 17 G/17G
17 POWDER, FOR SOLUTION ORAL DAILY PRN
Status: DISCONTINUED | OUTPATIENT
Start: 2024-05-22 | End: 2024-05-23

## 2024-05-22 RX ORDER — AMOXICILLIN 250 MG
2 CAPSULE ORAL 2 TIMES DAILY PRN
Status: DISCONTINUED | OUTPATIENT
Start: 2024-05-22 | End: 2024-05-23

## 2024-05-22 RX ORDER — ACETAMINOPHEN 160 MG/5ML
650 SOLUTION ORAL EVERY 4 HOURS PRN
Status: DISCONTINUED | OUTPATIENT
Start: 2024-05-22 | End: 2024-05-23

## 2024-05-22 RX ORDER — BISACODYL 10 MG
10 SUPPOSITORY, RECTAL RECTAL DAILY PRN
Status: DISCONTINUED | OUTPATIENT
Start: 2024-05-22 | End: 2024-05-23

## 2024-05-22 RX ORDER — SODIUM CHLORIDE 0.9 % (FLUSH) 0.9 %
10 SYRINGE (ML) INJECTION EVERY 12 HOURS SCHEDULED
Status: DISCONTINUED | OUTPATIENT
Start: 2024-05-22 | End: 2024-05-24 | Stop reason: HOSPADM

## 2024-05-22 RX ORDER — ACETAMINOPHEN 325 MG/1
650 TABLET ORAL EVERY 4 HOURS PRN
Status: DISCONTINUED | OUTPATIENT
Start: 2024-05-22 | End: 2024-05-24 | Stop reason: HOSPADM

## 2024-05-22 RX ORDER — ACETAMINOPHEN 650 MG/1
650 SUPPOSITORY RECTAL EVERY 4 HOURS PRN
Status: DISCONTINUED | OUTPATIENT
Start: 2024-05-22 | End: 2024-05-23

## 2024-05-22 RX ORDER — LEVOTHYROXINE SODIUM 0.03 MG/1
25 TABLET ORAL
Status: DISCONTINUED | OUTPATIENT
Start: 2024-05-23 | End: 2024-05-24 | Stop reason: HOSPADM

## 2024-05-22 RX ORDER — ROSUVASTATIN CALCIUM 20 MG/1
20 TABLET, COATED ORAL DAILY
Status: DISCONTINUED | OUTPATIENT
Start: 2024-05-23 | End: 2024-05-24 | Stop reason: HOSPADM

## 2024-05-22 RX ORDER — ONDANSETRON 2 MG/ML
4 INJECTION INTRAMUSCULAR; INTRAVENOUS EVERY 6 HOURS PRN
Status: DISCONTINUED | OUTPATIENT
Start: 2024-05-22 | End: 2024-05-24 | Stop reason: HOSPADM

## 2024-05-22 RX ORDER — BISACODYL 5 MG/1
5 TABLET, DELAYED RELEASE ORAL DAILY PRN
Status: DISCONTINUED | OUTPATIENT
Start: 2024-05-22 | End: 2024-05-23

## 2024-05-22 RX ORDER — ONDANSETRON 4 MG/1
4 TABLET, ORALLY DISINTEGRATING ORAL EVERY 6 HOURS PRN
Status: DISCONTINUED | OUTPATIENT
Start: 2024-05-22 | End: 2024-05-24 | Stop reason: HOSPADM

## 2024-05-22 RX ADMIN — PIPERACILLIN SODIUM AND TAZOBACTAM SODIUM 3.38 G: 3; .375 INJECTION, POWDER, LYOPHILIZED, FOR SOLUTION INTRAVENOUS at 20:12

## 2024-05-22 RX ADMIN — KETOROLAC TROMETHAMINE 15 MG: 15 INJECTION, SOLUTION INTRAMUSCULAR; INTRAVENOUS at 08:47

## 2024-05-22 RX ADMIN — SODIUM CHLORIDE, POTASSIUM CHLORIDE, SODIUM LACTATE AND CALCIUM CHLORIDE 100 ML/HR: 600; 310; 30; 20 INJECTION, SOLUTION INTRAVENOUS at 11:20

## 2024-05-22 RX ADMIN — Medication 10 ML: at 11:21

## 2024-05-22 RX ADMIN — Medication 10 ML: at 20:12

## 2024-05-22 RX ADMIN — PIPERACILLIN AND TAZOBACTAM 3.38 G: 3; .375 INJECTION, POWDER, FOR SOLUTION INTRAVENOUS at 10:32

## 2024-05-22 RX ADMIN — KETOROLAC TROMETHAMINE 15 MG: 15 INJECTION, SOLUTION INTRAMUSCULAR; INTRAVENOUS at 21:55

## 2024-05-22 NOTE — PROGRESS NOTES
Pt admitted to the observation unit from the emergency department with acute diverticulitis with phlegmonous changes but no evidence of abscess.  Patient had pain over the last 1 to 2 weeks worsening last night left lower quadrant.  Has had nausea.  Currently feeling improved.     On exam,   General: No acute distress, nontoxic  HEENT: Mucous membranes moist, atraumatic, EOMI  Neck: Full ROM  Pulm: Symmetric chest rise, nonlabored, lungs CTAB  Cardiovascular: Regular rate and rhythm, intact distal pulses  GI: Soft, minimal left lower quadrant tenderness to palpation, nondistended, no rebound, no guarding, bowel sounds present  MSK: Full ROM, no deformity  Skin: Warm, dry  Neuro: Awake, alert, oriented x 4, GCS 15, moving all extremities, no focal deficits  Psych: Calm, cooperative          Plan: Reassuring labs, phlegmonous changes on CT scan with plans for IV antibiotics and surgery consult.  Ultimately suspect that can likely discharge if ongoing reassuring exams and couple doses of IV antibiotics with general surgery clearance.  However if worsening of condition or surgery concern, may need to hospitalize for further management.  Questions or concerns addressed.         MD Attestation Note    SHARED VISIT: This visit was performed by BOTH a physician and an APC. The substantive portion of the medical decision making was performed by this attesting physician who made or approved the management plan and takes responsibility for patient management. All studies in the APC note (if performed) were independently interpreted by me.

## 2024-05-22 NOTE — ED NOTES
Nursing report ED to floor  Penny Brown  73 y.o.  female    HPI :  HPI (Adult)  Stated Reason for Visit: abd pain  History Obtained From: patient    Chief Complaint  Chief Complaint   Patient presents with    Abdominal Pain       Admitting doctor:   Rodriguez Burris MD    Admitting diagnosis:   The encounter diagnosis was Diverticulitis.    Code status:   Current Code Status       Date Active Code Status Order ID Comments User Context       Prior            Allergies:   Other, Ct contrast, and Prednisone    Isolation:   No active isolations    Intake and Output    Intake/Output Summary (Last 24 hours) at 5/22/2024 1123  Last data filed at 5/22/2024 1102  Gross per 24 hour   Intake 100 ml   Output --   Net 100 ml       Weight:       05/22/24  0749   Weight: 61.7 kg (136 lb)       Most recent vitals:   Vitals:    05/22/24 0836 05/22/24 0906 05/22/24 0936 05/22/24 1006   BP: 112/68 113/67 107/70 100/79   BP Location:       Patient Position:       Pulse: 67 67 60 64   Resp:       Temp:       TempSrc:       SpO2: 96% 97% 94% 94%   Weight:       Height:           Active LDAs/IV Access:   Lines, Drains & Airways       Active LDAs       Name Placement date Placement time Site Days    Peripheral IV 05/22/24 0813 Left Antecubital 05/22/24  0813  Antecubital  less than 1                    Labs (abnormal labs have a star):   Labs Reviewed   COMPREHENSIVE METABOLIC PANEL - Abnormal; Notable for the following components:       Result Value    Glucose 112 (*)     All other components within normal limits    Narrative:     GFR Normal >60  Chronic Kidney Disease <60  Kidney Failure <15    The GFR formula is only valid for adults with stable renal function between ages 18 and 70.   URINALYSIS W/ MICROSCOPIC IF INDICATED (NO CULTURE) - Abnormal; Notable for the following components:    Blood, UA Moderate (2+) (*)     Leuk Esterase, UA Moderate (2+) (*)     All other components within normal limits   CBC WITH AUTO DIFFERENTIAL -  Abnormal; Notable for the following components:    RDW 11.9 (*)     Neutrophil % 79.6 (*)     Lymphocyte % 10.3 (*)     Eosinophil % 0.2 (*)     Neutrophils, Absolute 7.34 (*)     All other components within normal limits   URINALYSIS, MICROSCOPIC ONLY - Abnormal; Notable for the following components:    RBC, UA 21-50 (*)     WBC, UA 3-5 (*)     All other components within normal limits   LIPASE - Normal   LACTIC ACID, PLASMA - Normal   BLOOD CULTURE   BLOOD CULTURE   RAINBOW DRAW    Narrative:     The following orders were created for panel order [place] Draw.  Procedure                               Abnormality         Status                     ---------                               -----------         ------                     Green Top (Gel)[[id number]]                                  Final result               Lavender Top[[id number]]                                     Final result               Gold Top - SST[[id number]]                                   Final result               [place] Top[[id number]]                                         Final result               Light Blue Top[[id number]]                                   Final result                 Please view results for these tests on the individual orders.   CBC AND DIFFERENTIAL    Narrative:     The following orders were created for panel order CBC & Differential.  Procedure                               Abnormality         Status                     ---------                               -----------         ------                     CBC Auto Differential[[id number]]        Abnormal            Final result                 Please view results for these tests on the individual orders.   GREEN TOP   LAVENDER TOP   GOLD TOP - SST   GRAY TOP   LIGHT BLUE TOP       EKG:   No orders to display       Meds given in ED:   Medications   sodium chloride 0.9 % flush 10 mL (has no administration in time range)   sodium chloride 0.9 % flush 10 mL (10 mL  Intravenous Given 5/22/24 1121)   sodium chloride 0.9 % flush 10 mL (has no administration in time range)   sodium chloride 0.9 % infusion 40 mL (has no administration in time range)   ondansetron ODT (ZOFRAN-ODT) disintegrating tablet 4 mg (has no administration in time range)     Or   ondansetron (ZOFRAN) injection 4 mg (has no administration in time range)   Potassium Replacement - Follow Nurse / BPA Driven Protocol (has no administration in time range)   Magnesium Standard Dose Replacement - Follow Nurse / BPA Driven Protocol (has no administration in time range)   Phosphorus Replacement - Follow Nurse / BPA Driven Protocol (has no administration in time range)   Calcium Replacement - Follow Nurse / BPA Driven Protocol (has no administration in time range)   lactated ringers infusion (100 mL/hr Intravenous New Bag 5/22/24 1120)   acetaminophen (TYLENOL) tablet 650 mg (has no administration in time range)     Or   acetaminophen (TYLENOL) 160 MG/5ML oral solution 650 mg (has no administration in time range)     Or   acetaminophen (TYLENOL) suppository 650 mg (has no administration in time range)   sennosides-docusate (PERICOLACE) 8.6-50 MG per tablet 2 tablet (has no administration in time range)     And   polyethylene glycol (MIRALAX) packet 17 g (has no administration in time range)     And   bisacodyl (DULCOLAX) EC tablet 5 mg (has no administration in time range)     And   bisacodyl (DULCOLAX) suppository 10 mg (has no administration in time range)   ketorolac (TORADOL) injection 15 mg (15 mg Intravenous Given 5/22/24 1582)   piperacillin-tazobactam (ZOSYN) 3.375 g IVPB in 100 mL NS MBP (CD) (0 g Intravenous Stopped 5/22/24 1102)       Imaging results:  CT Abdomen Pelvis Without Contrast    Result Date: 5/22/2024  1. Mild wall thickening and injection of the surrounding fat involving a segment of sigmoid colon. 2. Mass-like soft tissue thickening along the leftward aspect of the sigmoid colon. No loculated  fluid collections are seen. 3. The findings could all represent diverticulitis with an associated phlegmon. As this does have a somewhat mass-like appearance, short-term follow-up CT scan of the abdomen and pelvis suggested. 4. Tiny amount of free fluid in the pelvis. 5. No free air is seen. 6. Status post hysterectomy.  Radiation dose reduction techniques were utilized, including automated exposure control and exposure modulation based on body size.        Ambulatory status:   - standby    Social issues:   Social History     Socioeconomic History    Marital status:     Number of children: 1   Tobacco Use    Smoking status: Never    Smokeless tobacco: Never   Vaping Use    Vaping status: Never Used   Substance and Sexual Activity    Alcohol use: Yes     Alcohol/week: 1.0 - 2.0 standard drink of alcohol     Types: 1 - 2 Shots of liquor per week     Comment: 2-4/MONTH    Drug use: No    Sexual activity: Never       Peripheral Neurovascular  Peripheral Neurovascular (Adult)  Peripheral Neurovascular WDL: WDL    Neuro Cognitive  Neuro Cognitive (Adult)  Cognitive/Neuro/Behavioral WDL: .WDL except, mood/behavior  Mood/Behavior: anxious    Learning  Learning Assessment (Adult)  Learning Readiness and Ability: no barriers identified  Education Provided  Person Taught: patient    Respiratory  Respiratory WDL  Respiratory WDL: WDL    Abdominal Pain       Pain Assessments  Pain (Adult)  (0-10) Pain Rating: Rest: 7  (0-10) Pain Rating: Activity: 7  Pain Location: abdomen  Response to Pain Interventions: interventions effective per patient    NIH Stroke Scale       Lucia Sawant RN  05/22/24 11:23 EDT

## 2024-05-22 NOTE — PLAN OF CARE
Goal Outcome Evaluation:patient came to obs unit for antibiotics treatment and general surgery consult. Patient has had no pain thus far while in obs unit. Iv lr at 100ml for hyrdration and zosyn ordered iv tid. Waiting on gen surgery consult orders/tx plan.

## 2024-05-22 NOTE — ED TRIAGE NOTES
Patient to ed from home via PV with complaints of generalized abd pain with some nausea and constipation.

## 2024-05-22 NOTE — ED PROVIDER NOTES
EMERGENCY DEPARTMENT ENCOUNTER  Room Number:  01/01  PCP: Julian Garg MD  Independent Historians: Patient      HPI:  Chief Complaint: had concerns including Abdominal Pain.     Context: Penny Brown is a 73 y.o. female with a medical history of HLD, PAD, GERD, seizures who presents to the ED c/o acute abdominal pain.  Patient states for the last 1 to 2 weeks she has had intermittent light twinges of pain but starting last night pain became much more severe and much more constant.  Pain is located in the left lower quadrant.  Patient has had no diarrhea but states she does feel like she needs to go and cannot.  Patient has had some associated nausea.  Patient denies fever and chills.  Patient with no previous abdominal surgeries.  Patient had recent colonoscopy and was told she has diverticula but no history of diverticulitis.      Review of prior external notes (non-ED) -and- Review of prior external test results outside of this encounter: Office visit with internal medicine from 4/10/2024 reviewed and notable for visit secondary to dysuria.  Patient was diagnosed with UTI and plan to obtain urinalysis, initiate Bactrim and refer to urology.    PAST MEDICAL HISTORY  Active Ambulatory Problems     Diagnosis Date Noted    Arcus senilis of both corneas 01/18/2016    Cystocele 02/17/2015    Epiphora due to insufficient drainage of right side 01/18/2016    Incomplete emptying of bladder 02/17/2015    OAB (overactive bladder) 02/13/2015    Senile cataracts of both eyes 01/18/2016    Uterovaginal prolapse, incomplete 02/28/2015    Hypothyroidism 07/16/2018    Mixed hyperlipidemia 07/16/2018    Prediabetes 07/16/2018    Leg cramps 07/16/2018    Vertigo 12/04/2018    Hypotension due to hypovolemia 11/24/2020    Anxiety 07/06/2023    Paroxysmal atrial fibrillation 09/06/2023    Dysfunction of right eustachian tube 10/03/2023     Resolved Ambulatory Problems     Diagnosis Date Noted    New onset atrial fibrillation  07/01/2023     Past Medical History:   Diagnosis Date    Dizziness     Fibrocystic breast     GERD (gastroesophageal reflux disease)     Hyperlipidemia     PAD (peripheral artery disease)     Seizures     Syncopal episodes          PAST SURGICAL HISTORY  Past Surgical History:   Procedure Laterality Date    BLADDER SURGERY  2015    bladder lift    CERVICAL CONE BIOPSY  [id number]    COLONOSCOPY  02/01/2014    EYE SURGERY Right 01/19/2016    TEAR DUCT SURGERY/PARTIAL REMOVAL OF NASAL SINUS    EYE SURGERY Left 05/13/2011    TEAR DUCT SURGERY/DACRYOCYSTIRTHINOSTOMY AND REMOVAL OF ETHMOID SINUS    HYSTERECTOMY  03/25/2015    UTEROSACRAL SUSPENSION AND HYSTERECTOMY    OOPHORECTOMY      PAP SMEAR  01/18/2018    VOCAL CORD BIOPSY  08/12/2009         FAMILY HISTORY  Family History   Problem Relation Age of Onset    Diabetes Maternal Grandmother     Heart failure Mother     Thyroid disease Mother     Lung disease Brother     Alcohol abuse Maternal Aunt     Cancer Maternal Aunt     Breast cancer Maternal Aunt     Alcohol abuse Maternal Uncle     Cancer Maternal Uncle     Diabetes Maternal Uncle     Ovarian cancer Neg Hx          SOCIAL HISTORY  Social History     Socioeconomic History    Marital status:     Number of children: 1   Tobacco Use    Smoking status: Never    Smokeless tobacco: Never   Vaping Use    Vaping status: Never Used   Substance and Sexual Activity    Alcohol use: Yes     Alcohol/week: 1.0 - 2.0 standard drink of alcohol     Types: 1 - 2 Shots of liquor per week     Comment: 2-4/MONTH    Drug use: No    Sexual activity: Never         ALLERGIES  Other, Ct contrast, and Prednisone      REVIEW OF SYSTEMS  Review of Systems  Included in HPI  All systems reviewed and negative except for those discussed in HPI.      PHYSICAL EXAM    I have reviewed the triage vital signs and nursing notes.    ED Triage Vitals   Temp Heart Rate Resp BP SpO2   05/22/24 0749 05/22/24 0749 05/22/24 0749 05/22/24 0805 05/22/24  0749   98.9 °F (37.2 °C) 98 18 124/71 97 %      Temp src Heart Rate Source Patient Position BP Location FiO2 (%)   05/22/24 0749 05/22/24 0749 05/22/24 0805 05/22/24 0805 --   Tympanic Monitor Lying Right arm        Physical Exam  GENERAL: alert, no acute distress  SKIN: Warm, dry  HENT: Normocephalic, atraumatic  EYES: no scleral icterus  CV: regular rhythm, regular rate  RESPIRATORY: normal effort, lungs clear  ABDOMEN: soft, tenderness to palpation in the left lower quadrant, no guarding  MUSCULOSKELETAL: no deformity  NEURO: alert, moves all extremities, follows commands            LAB RESULTS  Recent Results (from the past 24 hour(s))   Comprehensive Metabolic Panel    Collection Time: 05/22/24  8:13 AM    Specimen: Blood   Result Value Ref Range    Glucose 112 (H) 65 - 99 mg/dL    BUN 10 8 - 23 mg/dL    Creatinine 0.81 0.57 - 1.00 mg/dL    Sodium 140 136 - 145 mmol/L    Potassium 3.7 3.5 - 5.2 mmol/L    Chloride 102 98 - 107 mmol/L    CO2 26.0 22.0 - 29.0 mmol/L    Calcium 9.5 8.6 - 10.5 mg/dL    Total Protein 7.4 6.0 - 8.5 g/dL    Albumin 4.6 3.5 - 5.2 g/dL    ALT (SGPT) 18 1 - 33 U/L    AST (SGOT) 27 1 - 32 U/L    Alkaline Phosphatase 67 39 - 117 U/L    Total Bilirubin 0.9 0.0 - 1.2 mg/dL    Globulin 2.8 gm/dL    A/G Ratio 1.6 g/dL    BUN/Creatinine Ratio 12.3 7.0 - 25.0    Anion Gap 12.0 5.0 - 15.0 mmol/L    eGFR 75.8 >60.0 mL/min/1.73   Lipase    Collection Time: 05/22/24  8:13 AM    Specimen: Blood   Result Value Ref Range    Lipase 25 13 - 60 U/L   Lactic Acid, Plasma    Collection Time: 05/22/24  8:13 AM    Specimen: Blood   Result Value Ref Range    Lactate 0.9 0.5 - 2.0 mmol/L   Green Top (Gel)    Collection Time: 05/22/24  8:13 AM   Result Value Ref Range    Extra Tube Hold for add-ons.    Lavender Top    Collection Time: 05/22/24  8:13 AM   Result Value Ref Range    Extra Tube hold for add-on    Gold Top - SST    Collection Time: 05/22/24  8:13 AM   Result Value Ref Range    Extra Tube Hold for  add-ons.    Gray Top    Collection Time: 05/22/24  8:13 AM   Result Value Ref Range    Extra Tube Hold for add-ons.    Light Blue Top    Collection Time: 05/22/24  8:13 AM   Result Value Ref Range    Extra Tube Hold for add-ons.    CBC Auto Differential    Collection Time: 05/22/24  8:13 AM    Specimen: Blood   Result Value Ref Range    WBC 9.23 3.40 - 10.80 10*3/mm3    RBC 4.57 3.77 - 5.28 10*6/mm3    Hemoglobin 14.3 12.0 - 15.9 g/dL    Hematocrit 42.3 34.0 - 46.6 %    MCV 92.6 79.0 - 97.0 fL    MCH 31.3 26.6 - 33.0 pg    MCHC 33.8 31.5 - 35.7 g/dL    RDW 11.9 (L) 12.3 - 15.4 %    RDW-SD 40.5 37.0 - 54.0 fl    MPV 9.5 6.0 - 12.0 fL    Platelets 193 140 - 450 10*3/mm3    Neutrophil % 79.6 (H) 42.7 - 76.0 %    Lymphocyte % 10.3 (L) 19.6 - 45.3 %    Monocyte % 9.5 5.0 - 12.0 %    Eosinophil % 0.2 (L) 0.3 - 6.2 %    Basophil % 0.1 0.0 - 1.5 %    Immature Grans % 0.3 0.0 - 0.5 %    Neutrophils, Absolute 7.34 (H) 1.70 - 7.00 10*3/mm3    Lymphocytes, Absolute 0.95 0.70 - 3.10 10*3/mm3    Monocytes, Absolute 0.88 0.10 - 0.90 10*3/mm3    Eosinophils, Absolute 0.02 0.00 - 0.40 10*3/mm3    Basophils, Absolute 0.01 0.00 - 0.20 10*3/mm3    Immature Grans, Absolute 0.03 0.00 - 0.05 10*3/mm3    nRBC 0.0 0.0 - 0.2 /100 WBC   Urinalysis With Microscopic If Indicated (No Culture) - Urine, Clean Catch    Collection Time: 05/22/24  8:14 AM    Specimen: Urine, Clean Catch   Result Value Ref Range    Color, UA Yellow Yellow, Straw    Appearance, UA Clear Clear    pH, UA 7.0 5.0 - 8.0    Specific Gravity, UA 1.014 1.005 - 1.030    Glucose, UA Negative Negative    Ketones, UA Negative Negative    Bilirubin, UA Negative Negative    Blood, UA Moderate (2+) (A) Negative    Protein, UA Negative Negative    Leuk Esterase, UA Moderate (2+) (A) Negative    Nitrite, UA Negative Negative    Urobilinogen, UA 1.0 E.U./dL 0.2 - 1.0 E.U./dL   Urinalysis, Microscopic Only - Urine, Clean Catch    Collection Time: 05/22/24  8:14 AM    Specimen: Urine,  Clean Catch   Result Value Ref Range    RBC, UA 21-50 (A) None Seen, 0-2 /HPF    WBC, UA 3-5 (A) None Seen, 0-2 /HPF    Bacteria, UA None Seen None Seen /HPF    Squamous Epithelial Cells, UA 0-2 None Seen, 0-2 /HPF    Hyaline Casts, UA 0-2 None Seen /LPF    Methodology Automated Microscopy          RADIOLOGY  CT Abdomen Pelvis Without Contrast    Result Date: 5/22/2024  CT OF THE ABDOMEN AND PELVIS WITHOUT CONTRAST 05/22/2024  HISTORY: Abdominal pain.  Spiral images were obtained from the lung bases to the symphysis pubis. No intravenous or oral contrast was given.  There is pectus excavatum deformity of the sternum. Tiny calcified granuloma seen in the left lower lobe.  The liver and gallbladder appear unremarkable. Granulomatous calcifications are seen in the spleen. The pancreas, adrenals and kidneys appear unremarkable.  There is mild to moderate wall thickening of a short segment of the sigmoid colon. Adjacent to the leftward aspect of this segment of sigmoid colon is a somewhat mass-like area of thickening measuring up to approximately 3.3 cm x approximately 2.7 cm. There are diverticula in this region with some injection of the surrounding fat.  There is a tiny amount of free fluid in the posterior pelvis.  No free air is seen.  Uterus has been removed. Urinary bladder is unremarkable.      1. Mild wall thickening and injection of the surrounding fat involving a segment of sigmoid colon. 2. Mass-like soft tissue thickening along the leftward aspect of the sigmoid colon. No loculated fluid collections are seen. 3. The findings could all represent diverticulitis with an associated phlegmon. As this does have a somewhat mass-like appearance, short-term follow-up CT scan of the abdomen and pelvis suggested. 4. Tiny amount of free fluid in the pelvis. 5. No free air is seen. 6. Status post hysterectomy.  Radiation dose reduction techniques were utilized, including automated exposure control and exposure modulation  based on body size.          MEDICATIONS GIVEN IN ER  Medications   sodium chloride 0.9 % flush 10 mL (has no administration in time range)   piperacillin-tazobactam (ZOSYN) 3.375 g IVPB in 100 mL NS MBP (CD) (has no administration in time range)   ketorolac (TORADOL) injection 15 mg (15 mg Intravenous Given 5/22/24 4950)         ORDERS PLACED DURING THIS VISIT:  Orders Placed This Encounter   Procedures    Blood Culture - Blood,    Blood Culture - Blood,    CT Abdomen Pelvis Without Contrast    [place] Draw    Comprehensive Metabolic Panel    Lipase    Urinalysis With Microscopic If Indicated (No Culture) - Urine, Clean Catch    Lactic Acid, Plasma    CBC Auto Differential    Urinalysis, Microscopic Only - Urine, Clean Catch    NPO Diet NPO Type: Strict NPO    Undress & Gown    Insert Peripheral IV    Initiate ED Observation Status    CBC & Differential    Green Top (Gel)    Lavender Top    Gold Top - SST    Gray Top    Light Blue Top         OUTPATIENT MEDICATION MANAGEMENT:  Current Facility-Administered Medications Ordered in Epic   Medication Dose Route Frequency Provider Last Rate Last Admin    piperacillin-tazobactam (ZOSYN) 3.375 g IVPB in 100 mL NS MBP (CD)  3.375 g Intravenous Once Dustin Lopez MD        sodium chloride 0.9 % flush 10 mL  10 mL Intravenous PRN Emergency, Triage Protocol, MD         Current Outpatient Medications Ordered in Epic   Medication Sig Dispense Refill    apixaban (ELIQUIS) 5 MG tablet tablet Take 1 tablet by mouth Every 12 (Twelve) Hours for 360 days. 180 tablet 3    Calcium-Vitamin D-Vitamin K (Viactiv Calcium Plus D) 650-12.5-40 MG-MCG-MCG chewable tablet Chew 1 each 2 (two) times a day.      dilTIAZem (CARDIZEM) 30 MG tablet Take 1 tablet by mouth 2 (Two) Times a Day. 1 tablet 2 times daily 180 tablet 3    levothyroxine (SYNTHROID, LEVOTHROID) 25 MCG tablet TAKE 1 TABLET BY MOUTH EVERY  MORNING 100 tablet 2    multivitamin with minerals tablet tablet Take 1 tablet by  mouth Daily.      rosuvastatin (CRESTOR) 20 MG tablet Take 1 tablet by mouth Daily. 90 tablet 3    clobetasol (TEMOVATE) 0.05 % cream Apply  topically to the appropriate area as directed Daily. As needed      estradiol (ESTRACE) 0.1 MG/GM vaginal cream As Needed. Uses as needed      LORazepam (Ativan) 0.5 MG tablet Take 1 tablet by mouth Every 8 (Eight) Hours As Needed for Anxiety. 16 tablet 0    omeprazole (priLOSEC) 20 MG capsule Take 1 capsule by mouth Daily As Needed.           PROCEDURES  Procedures            PROGRESS, DATA ANALYSIS, CONSULTS, AND MEDICAL DECISION MAKING  All labs have been independently interpreted by me.  All radiology studies have been reviewed by me. All EKG's have been independently viewed and interpreted by me.  Discussion below represents my analysis of pertinent findings related to patient's condition, differential diagnosis, treatment plan and final disposition.    Differential diagnosis includes but is not limited to diverticulitis, SBO, colitis, constipation, volvulus.    Clinical Scores:                   ED Course as of 05/22/24 0950   Wed May 22, 2024   0930 CT abdomen pelvis interpreted by me demonstrates findings consistent with diverticulitis [MW]   0948 Workup in the emergency department demonstrates radiological findings consistent with diverticulitis and phlegmonous changes.  No overt abscess.  Will obtain blood cultures.  Will initiate IV antibiotics. [MW]   0949 Utilizing shared decision-making techniques had a lengthy discussion with the patient where we discussed options for overnight stay and IV antibiotics versus trial of oral antibiotics at home.  At this point patient feels most comfortable with staying.  I would think this is reasonable given phlegmonous changes on CT scan.  Will contact observation unit and make arrangements. [MW]   0949 Discussed with Denise GARCIA with [facility] who agrees to admit [MW]      ED Course User Index  [MW] Dustin Lopez MD             AS  OF 09:50 EDT VITALS:    BP - 124/71  HR - 74  TEMP - 98.9 °F (37.2 °C) (Tympanic)  O2 SATS - 97%    COMPLEXITY OF CARE  The patient requires admission.      DIAGNOSIS  Final diagnoses:   Diverticulitis         DISPOSITION  ED Disposition       ED Disposition   Decision to Admit    Condition   --    Comment   --                Please note that portions of this document were completed with a voice recognition program.    Note Disclaimer: At [facility], we believe that sharing information builds trust and better relationships. You are receiving this note because you recently visited [facility]. It is possible you will see health information before a provider has talked with you about it. This kind of information can be easy to misunderstand. To help you fully understand what it means for your health, we urge you to discuss this note with your provider.         Dustin Lopez MD  05/22/24 4221

## 2024-05-22 NOTE — H&P
"[facility]   HISTORY AND PHYSICAL    Patient Name: Penny Brown  : 1950  MRN: [id number]  Primary Care Physician:  Julian Garg MD  Date of admission: 2024    Subjective   Subjective     Chief Complaint:   Chief Complaint   Patient presents with    Abdominal Pain         HPI:    Penny Brown is a 73 y.o. female with past medical history of GERD, hyperlipidemia, hypothyroidism, PAD, history of seizures, and A-fib on Eliquis who is being admitted for acute diverticulitis.  Patient states that she had intermittent abdominal pain for about 1-1/2 weeks that were very brief episodes and resolved on their own.  Patient states that suddenly on the evening of 2024 her abdominal pain started and it became more intense than the previous previous episodes and it did not improve on its own and was continuous through the evening.  Patient describes the abdominal pain as left lower quadrant without radiation and sharp in intensity with waves of \"jabbing\" pain.  Patient reports subjective chills through the evening but when she checked her temperature it was not consistent with fever.  Patient does report associated nausea without vomiting and abdominal distention.  Patient states that she feels the urge to have a bowel movement but has not had a bowel movement for the last 2 days and has not been passing flatus.  Patient states that she had a colonoscopy 10 years ago and she was told she had diverticulosis, but that she has never had a flareup.  Patient denies any history of abdominal surgeries.  Patient states that she has been putting out some nuts for birdfeeders here recently and she has been nibbling on them so she has had an increase intake of peanuts but otherwise no other change in her diet.  Patient denies any recent medication changes or recent antibiotic use or recent travel.    Review of Systems   All systems were reviewed and negative except for: What is listed in the above " HPI    Personal History     Past Medical History:   Diagnosis Date    Dizziness     Fibrocystic breast     GERD (gastroesophageal reflux disease)     Hyperlipidemia     Hypothyroidism     PAD (peripheral artery disease)     Seizures     as a child; LAST SEIZURE AT AGE 28    Syncopal episodes        Past Surgical History:   Procedure Laterality Date    BLADDER SURGERY  2015    bladder lift    CERVICAL CONE BIOPSY  [id number]    COLONOSCOPY  02/01/2014    EYE SURGERY Right 01/19/2016    TEAR DUCT SURGERY/PARTIAL REMOVAL OF NASAL SINUS    EYE SURGERY Left 05/13/2011    TEAR DUCT SURGERY/DACRYOCYSTIRTHINOSTOMY AND REMOVAL OF ETHMOID SINUS    HYSTERECTOMY  03/25/2015    UTEROSACRAL SUSPENSION AND HYSTERECTOMY    OOPHORECTOMY      PAP SMEAR  01/18/2018    VOCAL CORD BIOPSY  08/12/2009       Family History: family history includes Alcohol abuse in her maternal aunt and maternal uncle; Breast cancer in her maternal aunt; Cancer in her maternal aunt and maternal uncle; Diabetes in her maternal grandmother and maternal uncle; Heart failure in her mother; Lung disease in her brother; Thyroid disease in her mother. Otherwise pertinent FHx was reviewed and not pertinent to current issue.    Social History:  reports that she has never smoked. She has never used smokeless tobacco. She reports current alcohol use of about 1.0 - 2.0 standard drink of alcohol per week. She reports that she does not use drugs.    Home Medications:  Calcium-Vitamin D-Vitamin K, LORazepam, apixaban, clobetasol propionate, dilTIAZem, estradiol, levothyroxine, multivitamin with minerals, omeprazole, and rosuvastatin    Allergies:  Allergies   Allergen Reactions    Other Hives and Rash     FROM A STEROID INJECTION MANY YEARS AGO      Ct Contrast Anxiety and Other (See Comments)     Body Tremor    Prednisone Rash       Objective   Objective     Vitals:   Temp:  [98.9 °F (37.2 °C)] 98.9 °F (37.2 °C)  Heart Rate:  [60-98] 64  Resp:  [18] 18  BP:  (100-124)/(67-79) 100/79  Physical Exam    Constitutional: Awake, alert, nontoxic appearing female reclined in bed   Eyes: PERRL, sclerae anicteric, no conjunctival injection, EOMI   HENT: NCAT, mucous membranes dry, normal hearing   Neck: Supple, nontender, trachea midline   Respiratory: Clear to auscultation bilaterally, nonlabored respirations on room air   Cardiovascular: RRR, no murmurs, palpable pedal pulses bilaterally   Gastrointestinal: Positive bowel sounds, soft, mildly tender in the left lower quadrant without guarding or rebound, nondistended   Musculoskeletal: No bilateral ankle edema, no clubbing or cyanosis to extremities   Psychiatric: Appropriate affect, cooperative   Neurologic: Oriented x 3, strength symmetric in all extremities, Cranial Nerves grossly intact to confrontation, speech clear   Skin: No rashes     Result Review    Result Review:  I have personally reviewed the results from the time of this admission to 5/22/2024 11:49 EDT and agree with these findings:  [x]  Laboratory list / accordion  []  Microbiology  [x]  Radiology  []  EKG/Telemetry   []  Cardiology/Vascular   []  Pathology  []  Old records  []  Other:  Most notable findings include: Potassium 3.7, serum creatinine 0.81, normal AST and ALT, normal total bili, lipase 25, lactic 0.9, WBC 9.23, and hemoglobin 14.3.  UA negative for infection, but does show hematuria.  CT abdomen pelvis without shows mild wall thickening and injection of the surrounding fat involving the segment of sigmoid colon, masslike soft tissue thickening along the leftward aspect of the sigmoid colon but no loculated fluid collection appreciated, findings could all represent diverticulitis with an associated phlegmon, short-term follow-up CT scan of the abdomen pelvis is suggested though because there is a somewhat masslike appearance, tiny amount of free fluid in the pelvis, no free air seen, status post hysterectomy.      Assessment & Plan   Assessment /  Plan     Brief Patient Summary:  Penny Brown is a 73 y.o. female who is being admitted for acute diverticulitis with phlegmon    Active Hospital Problems:  Active Hospital Problems    Diagnosis     **Diverticulitis      Plan:   Acute diverticulitis with phlegmon:  -WBC within normal limits and afebrile since admission  -IV Zosyn continued  -Continue IV fluids  -Antiemetics as indicated  -CT abdomen pelvis without shows mild wall thickening and injection of the surrounding fat involving the segment of sigmoid colon, masslike soft tissue thickening along the leftward aspect of the sigmoid colon but no loculated fluid collection appreciated, findings could all represent diverticulitis with an associated phlegmon, short-term follow-up CT scan of the abdomen pelvis is suggested though because there is a somewhat masslike appearance, tiny amount of free fluid in the pelvis, no free air seen, status post hysterectomy.  -General surgery consulted  -Advance diet as tolerated    Chronic A-fib:  -Continue home regimen except we will start holding Eliquis in case patient needs surgical intervention during admission  -On cardiac monitor    Hyperlipidemia:  -Continue home regimen    GERD:  -Continue home regimen    Hypothyroidism:  -Continue home regimen      DVT prophylaxis:  Mechanical DVT prophylaxis orders are present.        CODE STATUS:    Level Of Support Discussed With: Patient  Code Status (Patient has no pulse and is not breathing): CPR (Attempt to Resuscitate)  Medical Interventions (Patient has pulse or is breathing): Full Support    Admission Status:  I believe this patient meets observation status.    Electronically signed by Denise Travis PA-C, 05/22/24, 11:49 AM EDT.        75 minutes has been spent by [facility] providers in the care of this patient while under observation status      I have worn appropriate PPE during this patient encounter, sanitized my hands both with  entering and exiting patient's room.    I have discussed plan of care with patient including advance care plan and/or surrogate decision maker.  Patient advises that their brother Will will be their primary surrogate decision maker

## 2024-05-23 LAB
ALBUMIN SERPL-MCNC: 3.4 G/DL (ref 3.5–5.2)
ALBUMIN/GLOB SERPL: 1.3 G/DL
ALP SERPL-CCNC: 56 U/L (ref 39–117)
ALT SERPL W P-5'-P-CCNC: 12 U/L (ref 1–33)
ANION GAP SERPL CALCULATED.3IONS-SCNC: 9.8 MMOL/L (ref 5–15)
AST SERPL-CCNC: 17 U/L (ref 1–32)
BILIRUB SERPL-MCNC: 1.1 MG/DL (ref 0–1.2)
BUN SERPL-MCNC: 11 MG/DL (ref 8–23)
BUN/CREAT SERPL: 11.8 (ref 7–25)
CALCIUM SPEC-SCNC: 8.7 MG/DL (ref 8.6–10.5)
CHLORIDE SERPL-SCNC: 106 MMOL/L (ref 98–107)
CO2 SERPL-SCNC: 24.2 MMOL/L (ref 22–29)
CREAT SERPL-MCNC: 0.93 MG/DL (ref 0.57–1)
DEPRECATED RDW RBC AUTO: 40 FL (ref 37–54)
EGFRCR SERPLBLD CKD-EPI 2021: 64.2 ML/MIN/1.73
ERYTHROCYTE [DISTWIDTH] IN BLOOD BY AUTOMATED COUNT: 11.9 % (ref 12.3–15.4)
GLOBULIN UR ELPH-MCNC: 2.7 GM/DL
GLUCOSE SERPL-MCNC: 96 MG/DL (ref 65–99)
HCT VFR BLD AUTO: 36.5 % (ref 34–46.6)
HGB BLD-MCNC: 12.5 G/DL (ref 12–15.9)
MCH RBC QN AUTO: 31.6 PG (ref 26.6–33)
MCHC RBC AUTO-ENTMCNC: 34.2 G/DL (ref 31.5–35.7)
MCV RBC AUTO: 92.4 FL (ref 79–97)
PLATELET # BLD AUTO: 174 10*3/MM3 (ref 140–450)
PMV BLD AUTO: 9.7 FL (ref 6–12)
POTASSIUM SERPL-SCNC: 3.7 MMOL/L (ref 3.5–5.2)
PROT SERPL-MCNC: 6.1 G/DL (ref 6–8.5)
RBC # BLD AUTO: 3.95 10*6/MM3 (ref 3.77–5.28)
SODIUM SERPL-SCNC: 140 MMOL/L (ref 136–145)
WBC NRBC COR # BLD AUTO: 5.16 10*3/MM3 (ref 3.4–10.8)

## 2024-05-23 PROCEDURE — 80053 COMPREHEN METABOLIC PANEL: CPT | Performed by: EMERGENCY MEDICINE

## 2024-05-23 PROCEDURE — 25010000002 PIPERACILLIN SOD-TAZOBACTAM PER 1 G: Performed by: SURGERY

## 2024-05-23 PROCEDURE — 25010000002 PIPERACILLIN SOD-TAZOBACTAM SO 3-0.375 G RECONSTITUTED SOLUTION 1 EACH VIAL: Performed by: SURGERY

## 2024-05-23 PROCEDURE — 25810000003 LACTATED RINGERS PER 1000 ML: Performed by: EMERGENCY MEDICINE

## 2024-05-23 PROCEDURE — 99222 1ST HOSP IP/OBS MODERATE 55: CPT | Performed by: SURGERY

## 2024-05-23 PROCEDURE — G0378 HOSPITAL OBSERVATION PER HR: HCPCS

## 2024-05-23 PROCEDURE — 25010000002 PIPERACILLIN SOD-TAZOBACTAM PER 1 G: Performed by: EMERGENCY MEDICINE

## 2024-05-23 PROCEDURE — 85027 COMPLETE CBC AUTOMATED: CPT | Performed by: EMERGENCY MEDICINE

## 2024-05-23 PROCEDURE — 96361 HYDRATE IV INFUSION ADD-ON: CPT

## 2024-05-23 RX ADMIN — APIXABAN 5 MG: 5 TABLET, FILM COATED ORAL at 21:10

## 2024-05-23 RX ADMIN — PIPERACILLIN SODIUM AND TAZOBACTAM SODIUM 3.38 G: 3; .375 INJECTION, POWDER, LYOPHILIZED, FOR SOLUTION INTRAVENOUS at 04:09

## 2024-05-23 RX ADMIN — ACETAMINOPHEN 650 MG: 325 TABLET, FILM COATED ORAL at 10:03

## 2024-05-23 RX ADMIN — DILTIAZEM HYDROCHLORIDE 30 MG: 30 TABLET, FILM COATED ORAL at 21:10

## 2024-05-23 RX ADMIN — Medication 10 ML: at 21:11

## 2024-05-23 RX ADMIN — APIXABAN 5 MG: 5 TABLET, FILM COATED ORAL at 11:14

## 2024-05-23 RX ADMIN — LEVOTHYROXINE SODIUM 25 MCG: 25 TABLET ORAL at 05:46

## 2024-05-23 RX ADMIN — PIPERACILLIN SODIUM AND TAZOBACTAM SODIUM 3.38 G: 3; .375 INJECTION, POWDER, LYOPHILIZED, FOR SOLUTION INTRAVENOUS at 12:06

## 2024-05-23 RX ADMIN — PIPERACILLIN SODIUM AND TAZOBACTAM SODIUM 3.38 G: 3; .375 INJECTION, POWDER, LYOPHILIZED, FOR SOLUTION INTRAVENOUS at 19:33

## 2024-05-23 RX ADMIN — SODIUM CHLORIDE, POTASSIUM CHLORIDE, SODIUM LACTATE AND CALCIUM CHLORIDE 100 ML/HR: 600; 310; 30; 20 INJECTION, SOLUTION INTRAVENOUS at 10:03

## 2024-05-23 RX ADMIN — DILTIAZEM HYDROCHLORIDE 30 MG: 30 TABLET, FILM COATED ORAL at 08:45

## 2024-05-23 RX ADMIN — Medication 10 ML: at 08:53

## 2024-05-23 RX ADMIN — ROSUVASTATIN CALCIUM 20 MG: 20 TABLET, FILM COATED ORAL at 08:45

## 2024-05-23 NOTE — PROGRESS NOTES
=ED OBSERVATION PROGRESS/DISCHARGE SUMMARY    Date of Admission: 5/22/2024   LOS: 0 days   PCP: Julian Garg MD      Subjective: Patient reports her symptoms are improving today.    Hospital Outcome:   73-year-old female admitted to the observation unit with acute diverticulitis.  In the ER, CT scan revealed findings suggestive of diverticulitis with associated phlegmon with a somewhat masslike appearance but no evidence of abscess, no free air seen, and a tiny amount of free fluid in the pelvis.  Patient started on antibiotics with IV Zosyn and IV fluids.    5/23: Patient was seen by general surgery, Dr. Wilkerson.  He requested patient be admitted to his service this morning however I was unaware of that until this afternoon.  Patient has been transferred to Dr. Wilkerson service, with tentative plans to transition to oral antibiotics tomorrow.  Discussed with patient who expresses understanding and is in agreement with plan.    ROS:  General: no fevers, chills  Respiratory: no cough, dyspnea  Cardiovascular: no chest pain, palpitations  Abdomen: No abdominal pain, nausea, vomiting, or diarrhea  Neurologic: No focal weakness    Objective   Physical Exam:  I have reviewed the vital signs.  Temp:  [97.9 °F (36.6 °C)-98.9 °F (37.2 °C)] 97.9 °F (36.6 °C)  Heart Rate:  [54-98] 54  Resp:  [18] 18  BP: ()/(58-79) 108/60  General Appearance:    Alert, cooperative, no distress  Head:    Normocephalic, atraumatic  Eyes:    Sclerae anicteric  Neck:   Supple, no mass  Lungs: Clear to auscultation bilaterally, respirations unlabored  Heart: Regular rate and rhythm, S1 and S2 normal, no murmur, rub or gallop  Abdomen:  Soft, non-tender, bowel sounds active, nondistended  Extremities: No clubbing, cyanosis, or edema to lower extremities  Pulses:  2+ and symmetric in distal lower extremities  Skin: No rashes   Neurologic: Oriented x3, Normal strength to extremities    Results Review:    I have reviewed the labs, radiology  results and diagnostic studies.    Results from last 7 days   Lab Units 05/22/24  0813   WBC 10*3/mm3 9.23   HEMOGLOBIN g/dL 14.3   HEMATOCRIT % 42.3   PLATELETS 10*3/mm3 193     Results from last 7 days   Lab Units 05/22/24  0813   SODIUM mmol/L 140   POTASSIUM mmol/L 3.7   CHLORIDE mmol/L 102   CO2 mmol/L 26.0   BUN mg/dL 10   CREATININE mg/dL 0.81   CALCIUM mg/dL 9.5   BILIRUBIN mg/dL 0.9   ALK PHOS U/L 67   ALT (SGPT) U/L 18   AST (SGOT) U/L 27   GLUCOSE mg/dL 112*     Imaging Results (Last 24 Hours)       Procedure Component Value Units Date/Time    CT Abdomen Pelvis Without Contrast [[id number]] Collected: 05/22/24 0932     Updated: 05/22/24 1630    Narrative:      CT OF THE ABDOMEN AND PELVIS WITHOUT CONTRAST 05/22/2024     HISTORY: Abdominal pain.     Spiral images were obtained from the lung bases to the symphysis pubis.  No intravenous or oral contrast was given.     There is pectus excavatum deformity of the sternum. Tiny calcified  granuloma seen in the left lower lobe.     The liver and gallbladder appear unremarkable. Granulomatous  calcifications are seen in the spleen. The pancreas, adrenals and  kidneys appear unremarkable.     There is mild to moderate wall thickening of a short segment of the  sigmoid colon. Adjacent to the leftward aspect of this segment of  sigmoid colon is a somewhat mass-like area of thickening measuring up to  approximately 3.3 cm x approximately 2.7 cm. There are diverticula in  this region with some injection of the surrounding fat.     There is a tiny amount of free fluid in the posterior pelvis.     No free air is seen.     Uterus has been removed. Urinary bladder is unremarkable.       Impression:      1. Mild wall thickening and injection of the surrounding fat involving a  segment of sigmoid colon.  2. Mass-like soft tissue thickening along the leftward aspect of the  sigmoid colon. No loculated fluid collections are seen.  3. The findings could all represent  diverticulitis with an associated  phlegmon. As this does have a somewhat mass-like appearance, short-term  follow-up CT scan of the abdomen and pelvis suggested.  4. Tiny amount of free fluid in the pelvis.  5. No free air is seen.  6. Status post hysterectomy.     Radiation dose reduction techniques were utilized, including automated  exposure control and exposure modulation based on body size.        This report was finalized on 5/22/2024 4:27 PM by Dr. Rico Crane M.D on Workstation: [id number]               I have reviewed the medications.  ---------------------------------------------------------------------------------------------  Assessment & Plan   Assessment/Problem List    Diverticulitis      Plan:    Acute diverticulitis with phlegmon:  -WBC within normal limits and afebrile since admission  -IV Zosyn continued  -Continue IV fluids  -Antiemetics as indicated  -CT abdomen pelvis without shows mild wall thickening and injection of the surrounding fat involving the segment of sigmoid colon, masslike soft tissue thickening along the leftward aspect of the sigmoid colon but no loculated fluid collection appreciated, findings could all represent diverticulitis with an associated phlegmon, short-term follow-up CT scan of the abdomen pelvis is suggested though because there is a somewhat masslike appearance, tiny amount of free fluid in the pelvis, no free air seen, status post hysterectomy.  -General surgery consulted, Dr. Wilkerson  -Clear liquid diet  -Admit to surgery     Chronic A-fib:  -Continue home regimen except we will start holding Eliquis in case patient needs surgical intervention during admission  -On cardiac monitor     Hyperlipidemia:  -Continue home regimen     GERD:  -Continue home regimen     Hypothyroidism:  -Continue home regimen       Disposition: Admit to surgery, Dr. Wilkerson      This note will serve as transfer note      50 minutes have been spent by [facility] providers in the care of this patient while under observation status.    Appropriate PPE worn during patient encounter.  Hand hygeine performed before and after seeing the patient.      TYRON Yuan 05/23/24 07:46 EDT

## 2024-05-23 NOTE — CASE MANAGEMENT/SOCIAL WORK
Discharge Planning Assessment  [facility]     Patient Name: Penny Brown  MRN: [id number]  Today's Date: 5/23/2024    Admit Date: 5/22/2024        Discharge Needs Assessment       Row Name 05/23/24 1409       Living Environment    People in Home friend(s)    Current Living Arrangements home    Potentially Unsafe Housing Conditions none    In the past 12 months has the electric, gas, oil, or water company threatened to shut off services in your home? No    Primary Care Provided by self    Provides Primary Care For no one    Family Caregiver if Needed none    Quality of Family Relationships helpful;involved    Able to Return to Prior Arrangements yes       Resource/Environmental Concerns    Resource/Environmental Concerns none       Transportation Needs    In the past 12 months, has lack of transportation kept you from medical appointments or from getting medications? no    In the past 12 months, has lack of transportation kept you from meetings, work, or from getting things needed for daily living? No       Food Insecurity    Within the past 12 months, you worried that your food would run out before you got the money to buy more. Never true    Within the past 12 months, the food you bought just didn't last and you didn't have money to get more. Never true       Transition Planning    Patient/Family Anticipates Transition to home    Patient/Family Anticipated Services at Transition none    Transportation Anticipated family or friend will provide       Discharge Needs Assessment    Equipment Currently Used at Home none    Concerns to be Addressed denies needs/concerns at this time    Anticipated Changes Related to Illness none    Equipment Needed After Discharge none    Provided Post Acute Provider List? N/A                   Discharge Plan       Row Name 05/23/24 1401       Plan    Plan Comments Entered room, introduced self and explained role; verified information on facesheet; patient lives in a single level  "home w/\"housemate\"; she works part time; still drives; is independent w/ADL's; denies use of any DME- Verified PCP listed on the facesheet; RX are filled at [facility] mail order or [facility] [place]; denies difficulty obtaining or affording RX; pt is interested in meds to beds; pt presented to the ER with abd pain- denies any d/c needs at this time- advised friend can transport home upon d/c.                  Continued Care and Services - Admitted Since 5/22/2024    No active coordination exists for this encounter.          Demographic Summary       Row Name 05/23/24 1408       General Information    Admission Type observation    Arrived From home    Required Notices Provided Observation Status Notice    Reason for Consult decision-making;discharge planning    Preferred Language English       Contact Information    Permission Granted to Share Info With                    Functional Status       Row Name 05/23/24 1408       Functional Status    Usual Activity Tolerance excellent    Current Activity Tolerance excellent       Assessment of Health Literacy    How often do you have someone help you read hospital materials? Never    How often do you have problems learning about your medical condition because of difficulty understanding written information? Never    How often do you have a problem understanding what is told to you about your medical condition? Never    How confident are you filling out medical forms by yourself? Extremely    Health Literacy Excellent       Functional Status, IADL    Medications independent    Meal Preparation independent    Housekeeping independent    Laundry independent    Shopping independent       Mental Status    General Appearance WDL WDL       Mental Status Summary    Recent Changes in Mental Status/Cognitive Functioning no changes       Employment/    Employment Status employed part-time                   Psychosocial    No documentation.                  " Abuse/Neglect    No documentation.                  Legal    No documentation.                  Substance Abuse    No documentation.                  Patient Forms    No documentation.                     Kelsie Rivera RN

## 2024-05-23 NOTE — CASE MANAGEMENT/SOCIAL WORK
Discharge Planning Assessment  [facility]     Patient Name: Penny Brown  MRN: [id number]  Today's Date: 5/23/2024    Admit Date: 5/22/2024        Discharge Needs Assessment       Row Name 05/23/24 1409       Living Environment    People in Home friend(s)    Current Living Arrangements home    Potentially Unsafe Housing Conditions none    In the past 12 months has the electric, gas, oil, or water company threatened to shut off services in your home? No    Primary Care Provided by self    Provides Primary Care For no one    Family Caregiver if Needed none    Quality of Family Relationships helpful;involved    Able to Return to Prior Arrangements yes       Resource/Environmental Concerns    Resource/Environmental Concerns none       Transportation Needs    In the past 12 months, has lack of transportation kept you from medical appointments or from getting medications? no    In the past 12 months, has lack of transportation kept you from meetings, work, or from getting things needed for daily living? No       Food Insecurity    Within the past 12 months, you worried that your food would run out before you got the money to buy more. Never true    Within the past 12 months, the food you bought just didn't last and you didn't have money to get more. Never true       Transition Planning    Patient/Family Anticipates Transition to home    Patient/Family Anticipated Services at Transition none    Transportation Anticipated family or friend will provide       Discharge Needs Assessment    Equipment Currently Used at Home none    Concerns to be Addressed denies needs/concerns at this time    Anticipated Changes Related to Illness none    Equipment Needed After Discharge none    Provided Post Acute Provider List? N/A                   Discharge Plan       Row Name 05/23/24 1401       Plan    Plan Comments Entered room, introduced self and explained role; verified information on facesheet; patient lives in a single level  "home w/\"housemate\"; she works part time; still drives; is independent w/ADL's; denies use of any DME- Verified PCP listed on the facesheet; RX are filled at [facility] mail [facility] or [facility] [place]; denies difficulty obtaining or affording RX; pt is interested in meds to beds; pt presented to the ER with abd pain- denies any d/c needs at this time- advised friend can transport home upon d/c.                  Continued Care and Services - Admitted Since 5/22/2024    No active coordination exists for this encounter.          Demographic Summary       Row Name 05/23/24 1408       General Information    Admission Type observation    Arrived From home    Required Notices Provided Observation Status Notice    Reason for Consult decision-making;discharge planning    Preferred Language English       Contact Information    Permission Granted to Share Info With                    Functional Status       Row Name 05/23/24 1408       Functional Status    Usual Activity Tolerance excellent    Current Activity Tolerance excellent       Assessment of Health Literacy    How often do you have someone help you read hospital materials? Never    How often do you have problems learning about your medical condition because of difficulty understanding written information? Never    How often do you have a problem understanding what is told to you about your medical condition? Never    How confident are you filling out medical forms by yourself? Extremely    Health Literacy Excellent       Functional Status, IADL    Medications independent    Meal Preparation independent    Housekeeping independent    Laundry independent    Shopping independent       Mental Status    General Appearance WDL WDL       Mental Status Summary    Recent Changes in Mental Status/Cognitive Functioning no changes       Employment/    Employment Status employed part-time                   Psychosocial    No documentation.                  " Abuse/Neglect    No documentation.                  Legal    No documentation.                  Substance Abuse    No documentation.                  Patient Forms    No documentation.                     Kelsie Rivera RN

## 2024-05-23 NOTE — PLAN OF CARE
Problem: Adult Inpatient Plan of Care  Goal: Plan of Care Review  Outcome: Ongoing, Progressing  Flowsheets (Taken 5/23/2024 0519)  Progress: improving  Plan of Care Reviewed With: patient  Outcome Evaluation: Pt alert and oriented x 4, resp even and unlabored, NAD noted. no c/o pain at this time. surgical consult pending.  Goal: Patient-Specific Goal (Individualized)  Outcome: Ongoing, Progressing  Goal: Absence of Hospital-Acquired Illness or Injury  Outcome: Ongoing, Progressing  Intervention: Identify and Manage Fall Risk  Recent Flowsheet Documentation  Taken 5/23/2024 0400 by Stephon Harp, DEBBY  Safety Promotion/Fall Prevention:   safety round/check completed   room organization consistent   nonskid shoes/slippers when out of bed   clutter free environment maintained   assistive device/personal items within reach   activity supervised   fall prevention program maintained  Taken 5/23/2024 0200 by Stephon Harp, RN  Safety Promotion/Fall Prevention:   safety round/check completed   room organization consistent   nonskid shoes/slippers when out of bed   clutter free environment maintained   assistive device/personal items within reach   activity supervised   fall prevention program maintained  Taken 5/23/2024 0000 by Stephon Harp, RN  Safety Promotion/Fall Prevention:   safety round/check completed   room organization consistent   nonskid shoes/slippers when out of bed   clutter free environment maintained   assistive device/personal items within reach   activity supervised   fall prevention program maintained  Taken 5/22/2024 2200 by Stephon Harp, RN  Safety Promotion/Fall Prevention:   safety round/check completed   room organization consistent   nonskid shoes/slippers when out of bed   clutter free environment maintained   assistive device/personal items within reach   activity supervised   fall prevention program maintained  Taken 5/22/2024 2000 by Stephon Harp, RN  Safety Promotion/Fall Prevention:    safety round/check completed   room organization consistent   nonskid shoes/slippers when out of bed   clutter free environment maintained   assistive device/personal items within reach   activity supervised   fall prevention program maintained  Intervention: Prevent and Manage VTE (Venous Thromboembolism) Risk  Recent Flowsheet Documentation  Taken 5/22/2024 2000 by Stephon Harp RN  VTE Prevention/Management:   sequential compression devices off   patient refused intervention  Intervention: Prevent Infection  Recent Flowsheet Documentation  Taken 5/23/2024 0400 by Stephon Harp RN  Infection Prevention: rest/sleep promoted  Taken 5/23/2024 0200 by Stephon Harp RN  Infection Prevention: rest/sleep promoted  Taken 5/23/2024 0000 by Stephon Harp RN  Infection Prevention: rest/sleep promoted  Taken 5/22/2024 2200 by Stephon Harp RN  Infection Prevention: rest/sleep promoted  Taken 5/22/2024 2000 by Stephon Harp RN  Infection Prevention: rest/sleep promoted  Goal: Optimal Comfort and Wellbeing  Outcome: Ongoing, Progressing  Intervention: Provide Person-Centered Care  Recent Flowsheet Documentation  Taken 5/22/2024 2000 by Stephon Harp RN  Trust Relationship/Rapport:   care explained   choices provided   emotional support provided   questions answered   empathic listening provided   questions encouraged   reassurance provided   thoughts/feelings acknowledged  Goal: Readiness for Transition of Care  Outcome: Ongoing, Progressing     Problem: Pain Acute  Goal: Acceptable Pain Control and Functional Ability  Outcome: Ongoing, Progressing   Goal Outcome Evaluation:  Plan of Care Reviewed With: patient        Progress: improving  Outcome Evaluation: Pt alert and oriented x 4, resp even and unlabored, NAD noted. no c/o pain at this time. surgical consult pending.

## 2024-05-23 NOTE — H&P
ASSESSMENT/PLAN:    73-year-old lady with first episode of sigmoid diverticulitis.  She does have a small possible phlegmon adjacent to the sigmoid colon.  Her physical exam is completely benign.  I recommended an additional day of intravenous antibiotics.  If she continues to improve she should be able to go home tomorrow to complete a course of outpatient oral antibiotics.  She is due surveillance colonoscopy as she is greater than 10 years out from her last one and we will arrange this as an outpatient.    CC:     Diverticulitis    HPI:    73-year-old lady with several day history of worsening moderately severe lower abdominal pain.  No nausea or vomiting.  No fever or chills.  Relevant review of systems negative other than presenting complaints.    ENDOSCOPY:   Colonoscopy 10+ years ago    RADIOLOGY:   CT abdomen pelvis 5/22/2024: Mild wall thickening and injection of the surrounding fat involving a segment of sigmoid colon.  Masslike soft tissue thickening along the leftward aspect of the sigmoid colon.  On my review of images, she has moderate sigmoid diverticulitis with possible adjacent phlegmon    LABS:    WBC 5.16  Hemoglobin 12.5  Platelets 174  CMP normal except albumin 3.4    SOCIAL HISTORY:   Denies tobacco use  Occasional alcohol use    FAMILY HISTORY:    Colorectal cancer: Negative    PREVIOUS ABDOMINAL SURGERY    Hysterectomy    PAST MEDICAL HISTORY:    Atrial fibrillation  Hypertension  Hypothyroidism  Hyperlipidemia  Gastroesophageal reflux disease    MEDICATIONS:   Eliquis  Diltiazem  Synthroid  Crestor  Estrace  Omeprazole    ALLERGIES:   CT contrast dye: Anxiety  Prednisone: Rash    PHYSICAL EXAM:   Constitutional: No acute distress  Vital signs:   Weight: 137 pounds  Height: 63 inches  BMI: 24.3  Temperature 97.7  RR 18  HR 58  /65  Respiratory: Normal nonlabored inspiratory effort  Cardiovascular: Regular rate, no jugular venous distention  Gastrointestinal: Soft, minimally tender left  Call [phone] to schedule echo.  EKG today  Echocardiogram  Follow up in Cardiology Clinic in 3 months  Follow up with PCP as directed   lower quadrant and suprapubic, nontender elsewhere    ESAU WOOD M.D.

## 2024-05-23 NOTE — PLAN OF CARE
Problem: Adult Inpatient Plan of Care  Goal: Plan of Care Review  Outcome: Ongoing, Progressing  Goal: Patient-Specific Goal (Individualized)  Outcome: Ongoing, Progressing  Goal: Absence of Hospital-Acquired Illness or Injury  Outcome: Ongoing, Progressing  Intervention: Identify and Manage Fall Risk  Recent Flowsheet Documentation  Taken 5/23/2024 0958 by Shelia Fall RN  Safety Promotion/Fall Prevention: activity supervised  Taken 5/23/2024 0839 by Shelia Fall RN  Safety Promotion/Fall Prevention: activity supervised  Intervention: Prevent and Manage VTE (Venous Thromboembolism) Risk  Recent Flowsheet Documentation  Taken 5/23/2024 0958 by Shelia Fall RN  Activity Management: up ad momo  Taken 5/23/2024 0839 by Shelia Fall RN  Activity Management: activity encouraged  VTE Prevention/Management: patient refused intervention  Range of Motion: active ROM (range of motion) encouraged  Intervention: Prevent Infection  Recent Flowsheet Documentation  Taken 5/23/2024 0958 by Shelia Fall RN  Infection Prevention:   cohorting utilized   environmental surveillance performed  Taken 5/23/2024 0839 by Shelia Fall RN  Infection Prevention:   cohorting utilized   environmental surveillance performed  Goal: Optimal Comfort and Wellbeing  Outcome: Ongoing, Progressing  Intervention: Monitor Pain and Promote Comfort  Recent Flowsheet Documentation  Taken 5/23/2024 0839 by Shelia Fall RN  Pain Management Interventions: no interventions per patient request  Intervention: Provide Person-Centered Care  Recent Flowsheet Documentation  Taken 5/23/2024 0839 by Shelia Fall RN  Trust Relationship/Rapport:   care explained   choices provided   emotional support provided  Goal: Readiness for Transition of Care  Outcome: Ongoing, Progressing   Goal Outcome Evaluation:

## 2024-05-24 ENCOUNTER — READMISSION MANAGEMENT (OUTPATIENT)
Dept: CALL CENTER | Facility: HOSPITAL | Age: 74
End: 2024-05-24
Payer: MEDICARE

## 2024-05-24 VITALS
HEART RATE: 63 BPM | SYSTOLIC BLOOD PRESSURE: 112 MMHG | DIASTOLIC BLOOD PRESSURE: 72 MMHG | RESPIRATION RATE: 18 BRPM | HEIGHT: 63 IN | BODY MASS INDEX: 24.27 KG/M2 | WEIGHT: 137 LBS | TEMPERATURE: 98.1 F | OXYGEN SATURATION: 98 %

## 2024-05-24 PROCEDURE — 25010000002 PIPERACILLIN SOD-TAZOBACTAM PER 1 G: Performed by: SURGERY

## 2024-05-24 PROCEDURE — 99238 HOSP IP/OBS DSCHRG MGMT 30/<: CPT | Performed by: SURGERY

## 2024-05-24 PROCEDURE — G0378 HOSPITAL OBSERVATION PER HR: HCPCS

## 2024-05-24 RX ORDER — AMOXICILLIN AND CLAVULANATE POTASSIUM 875; 125 MG/1; MG/1
1 TABLET, FILM COATED ORAL 2 TIMES DAILY
Qty: 14 TABLET | Refills: 0 | Status: SHIPPED | OUTPATIENT
Start: 2024-05-24 | End: 2024-05-31

## 2024-05-24 RX ADMIN — PANTOPRAZOLE SODIUM 40 MG: 40 TABLET, DELAYED RELEASE ORAL at 06:03

## 2024-05-24 RX ADMIN — DILTIAZEM HYDROCHLORIDE 30 MG: 30 TABLET, FILM COATED ORAL at 08:18

## 2024-05-24 RX ADMIN — Medication 10 ML: at 08:18

## 2024-05-24 RX ADMIN — LEVOTHYROXINE SODIUM 25 MCG: 25 TABLET ORAL at 06:03

## 2024-05-24 RX ADMIN — ROSUVASTATIN CALCIUM 20 MG: 20 TABLET, FILM COATED ORAL at 08:18

## 2024-05-24 RX ADMIN — PIPERACILLIN SODIUM AND TAZOBACTAM SODIUM 3.38 G: 3; .375 INJECTION, POWDER, LYOPHILIZED, FOR SOLUTION INTRAVENOUS at 04:05

## 2024-05-24 RX ADMIN — APIXABAN 5 MG: 5 TABLET, FILM COATED ORAL at 08:18

## 2024-05-24 NOTE — PLAN OF CARE
Goal Outcome Evaluation:                 Patient admitted to ED observations for abdominal pain x2 weeks, no BM since Sunday. CT is positive for Diverticulitis. GI is consulted. Patient placed on antibiotics, waiting room upstairs. Patient is aware of plan of care and agreeable to it.

## 2024-05-24 NOTE — OUTREACH NOTE
Prep Survey      Flowsheet Row Responses   [facility] patient discharged from? [place]   Is LACE score < 7 ? Yes   Eligibility [facility]   Date of Admission 05/22/24   Date of Discharge 05/24/24   Discharge Disposition Home or Self Care   Discharge diagnosis Diverticulitis   Does the patient have one of the following disease processes/diagnoses(primary or secondary)? Other   Does the patient have Home health ordered? No   Is there a DME ordered? No   Prep survey completed? Yes            Shu Mitchell Registered Nurse

## 2024-05-26 NOTE — DISCHARGE SUMMARY
DATE OF ADMIT:    5/22/2024    DATE OF DISCHARGE:    5/24/2024    DIAGNOSIS:    Sigmoid diverticulitis    RADIOGRAPHIC STUDIES SUMMARY:  CT abdomen pelvis 5/22/2024: Mild wall thickening and injection of the surrounding fat involving a segment of sigmoid colon. Masslike soft tissue thickening along the leftward aspect of the sigmoid colon. On my review of images, she has moderate sigmoid diverticulitis with possible adjacent phlegmon     LABORATORY SUMMARY:  WBC 5.16  Hemoglobin 12.5  Platelets 174  CMP normal except albumin 3.4    SUMMARY OF HOSPITAL COURSE:     73-year-old lady admitted with first episode of sigmoid diverticulitis.  Her symptoms quickly resolved with intravenous antibiotics and she was discharged home with prescription for Augmentin.    DIET: Regular    ACTIVITY: Ad momo.    MEDICATIONS: No changes to her preadmission medications.  Prescription for Augmentin given.    FOLLOW-UP: To call office and schedule 1 week follow-up appointment    Gamaliel Wilkerson M.D.

## 2024-05-26 NOTE — CASE MANAGEMENT/SOCIAL WORK
Case Management Discharge Note      Final Note: home    Provided Post Acute Provider List?: N/A    Selected Continued Care - Discharged on 5/24/2024 Admission date: 5/22/2024 - Discharge disposition: Home or Self Care      Destination    No services have been selected for the patient.                Durable Medical Equipment    No services have been selected for the patient.                Dialysis/Infusion    No services have been selected for the patient.                Home Medical Care    No services have been selected for the patient.                Therapy    No services have been selected for the patient.                Community Resources    No services have been selected for the patient.                Community & DME    No services have been selected for the patient.                         Final Discharge Disposition Code: 01 - home or self-care

## 2024-05-27 LAB
BACTERIA SPEC AEROBE CULT: NORMAL
BACTERIA SPEC AEROBE CULT: NORMAL

## 2024-05-28 ENCOUNTER — TRANSITIONAL CARE MANAGEMENT TELEPHONE ENCOUNTER (OUTPATIENT)
Dept: CALL CENTER | Facility: HOSPITAL | Age: 74
End: 2024-05-28
Payer: MEDICARE

## 2024-05-28 NOTE — OUTREACH NOTE
Call Center TCM Note      Flowsheet Row Responses   [facility] patient discharged from? [place]   Does the patient have one of the following disease processes/diagnoses(primary or secondary)? Other   TCM attempt successful? Yes   Call start time 0945   Call end time 0950   Discharge diagnosis Diverticulitis   Is patient permission given to speak with other caregiver? Yes   List who call center can speak with Friend- Mira Muller (listed on PCP verbal)   Person spoke with today (if not patient) and relationship Mira   Is the patient taking all medications as directed (includes completed medication regime)? Yes   Comments Follow up with surgeon Dr. Wilkerson on 6/6.   Does the patient have an appointment with their PCP within 7-14 days of discharge? No   Nursing Interventions Patient desires to follow up with specialty only   Has home health visited the patient within 72 hours of discharge? N/A   Psychosocial issues? No   Did the patient receive a copy of their discharge instructions? Yes   What is the patient's perception of their health status since discharge? Improving   Is the patient/caregiver able to teach back signs and symptoms related to disease process for when to call PCP? Yes   Is the patient/caregiver able to teach back signs and symptoms related to disease process for when to call 911? Yes   Is the patient/caregiver able to teach back the hierarchy of who to call/visit for symptoms/problems? PCP, Specialist, Home health nurse, Urgent Care, ED, 911 Yes   TCM call completed? Yes   Wrap up additional comments Brief call with friend Mira, states patient is doing well, no questions, patient will be scheduling all her follow up appts.   Call end time 0950   Would this patient benefit from a Referral to Amb Social Work? No   Is the patient interested in additional calls from an ambulatory ? No            Louann Rangel RN    5/28/2024, 09:50 EDT

## 2024-06-06 ENCOUNTER — OFFICE VISIT (OUTPATIENT)
Dept: SURGERY | Facility: CLINIC | Age: 74
End: 2024-06-06
Payer: MEDICARE

## 2024-06-06 VITALS
WEIGHT: 138.6 LBS | SYSTOLIC BLOOD PRESSURE: 124 MMHG | BODY MASS INDEX: 24.56 KG/M2 | HEIGHT: 63 IN | DIASTOLIC BLOOD PRESSURE: 70 MMHG

## 2024-06-06 DIAGNOSIS — K57.92 DIVERTICULITIS: Primary | ICD-10-CM

## 2024-06-06 DIAGNOSIS — Z12.11 SCREEN FOR COLON CANCER: ICD-10-CM

## 2024-06-07 NOTE — PROGRESS NOTES
ASSESSMENT/PLAN:    73-year-old recently admitted to the hospital with her first episode of sigmoid diverticulitis.  Her abdominal pain is completely resolved.  Physical exam is completely benign.  She has completed her course of antibiotics.  He is greater than 10 years out from her last colonoscopy and I recommended proceeding with that at any time.  She has scheduled for 7/24/2024.    CC:     Follow-up diverticulitis    HPI:    Abdominal pain is resolved.  Antibiotics have been completed.  Relevant review of systems negative other than presenting complaints.    ENDOSCOPY:   Colonoscopy greater than 10 years ago    RADIOLOGY:   CT abdomen pelvis 5/22/2024: Mild wall thickening and injection of the surrounding fat involving a segment of sigmoid colon. Masslike soft tissue thickening along the leftward aspect of the sigmoid colon. On my review of images, she has moderate sigmoid diverticulitis with possible adjacent phlegmon, I reviewed these images with her today.    SOCIAL HISTORY:   Denies tobacco use  Occasional alcohol use    FAMILY HISTORY:    Colorectal cancer: Negative    PREVIOUS ABDOMINAL SURGERY    Hysterectomy    PAST MEDICAL HISTORY:    Atrial fibrillation  Hypertension  Hypothyroidism  Hyperlipidemia  Gastroesophageal reflux disease    MEDICATIONS:   Eliquis  Diltiazem  Estradiol  Levothyroxine  Lorazepam  Multivitamin  Omeprazole  Rosuvastatin    ALLERGIES:   CT contrast dye: Anxiety  Prednisone: Rash    PHYSICAL EXAM:   Constitutional: No acute distress  Vital signs:   Weight: 138 pounds  Height: 63 inches  BMI: 24.6  Abdomen: Soft, nondistended and nontender    ESAU WOOD M.D.

## 2024-07-08 ENCOUNTER — TELEPHONE (OUTPATIENT)
Dept: SURGERY | Facility: CLINIC | Age: 74
End: 2024-07-08

## 2024-07-08 ENCOUNTER — OFFICE VISIT (OUTPATIENT)
Dept: SURGERY | Facility: CLINIC | Age: 74
End: 2024-07-08
Payer: MEDICARE

## 2024-07-08 VITALS
DIASTOLIC BLOOD PRESSURE: 70 MMHG | WEIGHT: 134 LBS | SYSTOLIC BLOOD PRESSURE: 114 MMHG | HEIGHT: 63 IN | BODY MASS INDEX: 23.74 KG/M2

## 2024-07-08 DIAGNOSIS — K57.92 DIVERTICULITIS: Primary | ICD-10-CM

## 2024-07-08 PROCEDURE — 1159F MED LIST DOCD IN RCRD: CPT | Performed by: PHYSICIAN ASSISTANT

## 2024-07-08 PROCEDURE — 99213 OFFICE O/P EST LOW 20 MIN: CPT | Performed by: PHYSICIAN ASSISTANT

## 2024-07-08 PROCEDURE — 1160F RVW MEDS BY RX/DR IN RCRD: CPT | Performed by: PHYSICIAN ASSISTANT

## 2024-07-08 RX ORDER — POLYETHYLENE GLYCOL 3350 17 G/17G
17 POWDER, FOR SOLUTION ORAL DAILY
COMMUNITY

## 2024-07-08 RX ORDER — AMOXICILLIN AND CLAVULANATE POTASSIUM 875; 125 MG/1; MG/1
1 TABLET, FILM COATED ORAL 2 TIMES DAILY
Qty: 20 TABLET | Refills: 0 | Status: SHIPPED | OUTPATIENT
Start: 2024-07-08

## 2024-07-08 RX ORDER — AMOXICILLIN AND CLAVULANATE POTASSIUM 875; 125 MG/1; MG/1
1 TABLET, FILM COATED ORAL 2 TIMES DAILY
Qty: 20 TABLET | Refills: 0 | Status: SHIPPED | OUTPATIENT
Start: 2024-07-08 | End: 2024-07-08

## 2024-07-08 RX ORDER — AMOXICILLIN AND CLAVULANATE POTASSIUM 875; 125 MG/1; MG/1
1 TABLET, FILM COATED ORAL 2 TIMES DAILY
Qty: 20 TABLET | Refills: 0 | Status: SHIPPED | OUTPATIENT
Start: 2024-07-08 | End: 2024-07-08 | Stop reason: SDUPTHER

## 2024-07-08 NOTE — TELEPHONE ENCOUNTER
Pt is calling the Augmentin medication was accidentally sent to the mail pharmacy can you send to the [facility] [address]. Also patient wondering what medication she can take to help with the pain?

## 2024-07-09 NOTE — PROGRESS NOTES
ASSESSMENT/PLAN:    73-year-old lady returning to the office with recurrent diverticulitis.  I have provided her with a 10-day prescription for Augmentin in hopes of resolving this episode.  We will also delay her colonoscopy until August in order to allow this current bout of diverticulitis to resolve.  If she begins to have fever, chills, severe abdominal pain or is worsening from her current state she should go to the emergency room.  All questions were answered and she was willing to proceed with all recommendations.    CC:     Recurrent diverticulitis    HPI:    73-year-old lady returning to the office today after last having seen Dr. Wilkerson during a recent hospital admission for diverticulitis in May of this year.  It did resolve after IV antibiotics and home Augmentin with a colonoscopy being scheduled for later this month.  Over the last 3 to 4 days however she began developing sharp left lower quadrant pain and difficulty having bowel movements, similar to the episodes that she had been experiencing that prompted her to go to the emergency room.  While the sharpest of the pains have subsided, she still feels very distended and uncomfortable.  She denies having fever, chills, dark tarry stools, bright blood with her bowel movements or any additional complaints.    ENDOSCOPY:   Colonoscopy 2014 normal    RADIOLOGY:   CT abdomen pelvis 5/22/2024: Mild wall thickening and injection of the surrounding fat involving the segment of sigmoid colon with masslike soft tissue thickening along the leftward aspect of the sigmoid colon all could represent diverticulitis with associated phlegmon    SOCIAL HISTORY:   Denies tobacco use  Occasional alcohol use    FAMILY HISTORY:    Colorectal cancer: None    PREVIOUS ABDOMINAL SURGERY    Hysterectomy with uterosacral suspension, oophorectomy, bladder left    OTHER SURGERY  Past Surgical History:   Procedure Laterality Date    BLADDER SURGERY  2015    bladder lift     CERVICAL CONE BIOPSY  [id number]    COLONOSCOPY  02/01/2014    EYE SURGERY Right 01/19/2016    TEAR DUCT SURGERY/PARTIAL REMOVAL OF NASAL SINUS    EYE SURGERY Left 05/13/2011    TEAR DUCT SURGERY/DACRYOCYSTIRTHINOSTOMY AND REMOVAL OF ETHMOID SINUS    HYSTERECTOMY  03/25/2015    UTEROSACRAL SUSPENSION AND HYSTERECTOMY    OOPHORECTOMY      PAP SMEAR  01/18/2018    VOCAL CORD BIOPSY  08/12/2009       PAST MEDICAL HISTORY:    Past Medical History:   Diagnosis Date    Abnormal ECG June 30, 2023    taken to ER by EMS-A-Fib, faint, nausea, headache    Diverticulitis of colon     Dizziness     Fibrocystic breast     GERD (gastroesophageal reflux disease)     Hyperlipidemia     Hypertension 2/15/24    180/101-Middle of night    Hypothyroidism     PAD (peripheral artery disease)     Screen for colon cancer 6/6/2024    Seizures     as a child; LAST SEIZURE AT AGE 28    Syncopal episodes        MEDICATIONS:     Current Outpatient Medications:     apixaban (ELIQUIS) 5 MG tablet tablet, Take 1 tablet by mouth Every 12 (Twelve) Hours for 360 days., Disp: 180 tablet, Rfl: 3    Calcium-Vitamin D-Vitamin K (Viactiv Calcium Plus D) 650-12.5-40 MG-MCG-MCG chewable tablet, Chew 1 each 2 (two) times a day., Disp: , Rfl:     clobetasol (TEMOVATE) 0.05 % cream, Apply  topically to the appropriate area as directed Daily. As needed, Disp: , Rfl:     dilTIAZem (CARDIZEM) 30 MG tablet, Take 1 tablet by mouth 2 (Two) Times a Day. 1 tablet 2 times daily, Disp: 180 tablet, Rfl: 3    estradiol (ESTRACE) 0.1 MG/GM vaginal cream, As Needed. Uses as needed, Disp: , Rfl:     levothyroxine (SYNTHROID, LEVOTHROID) 25 MCG tablet, TAKE 1 TABLET BY MOUTH EVERY  MORNING, Disp: 100 tablet, Rfl: 2    LORazepam (Ativan) 0.5 MG tablet, Take 1 tablet by mouth Every 8 (Eight) Hours As Needed for Anxiety., Disp: 16 tablet, Rfl: 0    multivitamin with minerals tablet tablet, Take 1 tablet by mouth Daily., Disp: , Rfl:     omeprazole (priLOSEC) 20 MG capsule, Take 1  "capsule by mouth Daily As Needed., Disp: , Rfl:     polyethylene glycol (MiraLax) 17 g packet, Take 17 g by mouth Daily., Disp: , Rfl:     rosuvastatin (CRESTOR) 20 MG tablet, Take 1 tablet by mouth Daily., Disp: 90 tablet, Rfl: 3    amoxicillin-clavulanate (AUGMENTIN) 875-125 MG per tablet, Take 1 tablet by mouth 2 (Two) Times a Day., Disp: 20 tablet, Rfl: 0    ALLERGIES:   Allergies   Allergen Reactions    Other Hives and Rash     FROM A STEROID INJECTION MANY YEARS AGO      Ct Contrast Anxiety and Other (See Comments)     Body Tremor    Prednisone Rash       PHYSICAL EXAM:   Constitutional: Well-developed well-nourished, no acute distress  Vital signs:   Height 63\"  Weight 134  BMI 23.75  Neck: Supple, no palpable mass, trachea midline  Respiratory: Clear to auscultation, normal inspiratory effort  Cardiovascular: Regular rate, no murmur, no carotid bruit  Gastrointestinal: Mild tenderness to palpation left lower quadrant, remainder of abdomen is soft, nontender  Psychiatric: Alert and oriented ×3, normal affect   BMI is within normal parameters. No other follow-up for BMI required.      John Paul Chau PA-C    [facility] - General Surgery  [address], [address]  [place] [address]    [address], [address]  [place] [address]    Office: [phone]  Fax: [phone]     "

## 2024-07-10 NOTE — PROGRESS NOTES
I have reviewed the notes, assessments, and/or procedures performed by John Paul Chau, I concur with her/his documentation of Penny Brown.

## 2024-09-12 ENCOUNTER — OFFICE VISIT (OUTPATIENT)
Dept: CARDIOLOGY | Facility: CLINIC | Age: 74
End: 2024-09-12
Payer: MEDICARE

## 2024-09-12 VITALS
SYSTOLIC BLOOD PRESSURE: 120 MMHG | DIASTOLIC BLOOD PRESSURE: 60 MMHG | HEART RATE: 63 BPM | HEIGHT: 63 IN | WEIGHT: 135 LBS | BODY MASS INDEX: 23.92 KG/M2 | OXYGEN SATURATION: 98 %

## 2024-09-12 DIAGNOSIS — I48.91 NEW ONSET ATRIAL FIBRILLATION: ICD-10-CM

## 2024-09-12 DIAGNOSIS — I48.0 PAROXYSMAL ATRIAL FIBRILLATION: Primary | ICD-10-CM

## 2024-09-12 RX ORDER — DILTIAZEM HYDROCHLORIDE 30 MG/1
30 TABLET, FILM COATED ORAL 2 TIMES DAILY
Start: 2024-09-12 | End: 2024-09-13 | Stop reason: SDUPTHER

## 2024-09-12 NOTE — PROGRESS NOTES
"Date of Office Visit: 24  Encounter Provider: MARCIO Castillo  Place of Service: [facility] CARDIOLOGY  Patient Name: Penny Brown  :1950    Chief Complaint   Patient presents with    Paroxysmal atrial fibrillation    Follow-up   :     HPI: Penny Brown is a 73 y.o. female  with paroxysmal atrial fibrillation, hyperlipidemia, anxiety, GERD, hypothyroidism.          She is followed by Dr. Katherine Alexander. I will visit with her for the first time and have reviewed her medical record.      She had a normal 2 day holter monitor 2022.  Vascular screening at that same time showed bilateral carotid artery plaque without stenosis and was otherwise normal.    Perfusion stress test 3/30/2023 showed no evidence of ischemia.  Her cardiac event monitor 2023 which showed burst of SVT and rare PACs.  She also had rare PVCs.  She then had a hospital stay in 2023 and had rapid A-fib which was finally documented.  She was prescribed diltiazem extended release 120 mg daily and apixaban 5 mg twice daily.    She was admitted in May 2024 with diverticulitis.  She was treated and prescribed antibiotic which she completed.      She presents today for reassessment.  She confirms taking diltiazem 30 mg twice daily and Eliquis 5 mg twice daily.  She has no bleeding issues.  She has overactive bladder and gets up frequently at night.  She has brief palpitations at night described as \"odd sensation\".  She also says there is some tingling in her chest.  She does cleansing deep breaths and that helps.  These typically last just a few minutes.  She has no chest pain or shortness of breath or edema or dizziness or palpitation.  She has an Apple Watch to check her rhythm on occasion.  She has history of varicose veins and some occasional cramping but nothing concerning.  She walks to stay active and also does frequent swimming.  Allergies   Allergen Reactions    Other Hives and Rash     " "FROM A STEROID INJECTION MANY YEARS AGO      Ct Contrast Anxiety and Other (See Comments)     Body Tremor    Prednisone Rash           Family and social history reviewed.     ROS  All other systems were reviewed and are negative          Objective:     Vitals:    09/12/24 1109   BP: 120/60   BP Location: Left arm   Patient Position: Sitting   Pulse: 63   SpO2: 98%   Weight: 61.2 kg (135 lb)   Height: 160 cm (63\")     Body mass index is 23.91 kg/m².    PHYSICAL EXAM:  Pulmonary:      Effort: Pulmonary effort is normal.      Breath sounds: Normal breath sounds.   Cardiovascular:      Normal rate. Regular rhythm.      Comments: Varicose veins bilateral lower extremity        Procedures      Current Outpatient Medications   Medication Sig Dispense Refill    apixaban (ELIQUIS) 5 MG tablet tablet Take 1 tablet by mouth Every 12 (Twelve) Hours for 360 days. 180 tablet 3    Calcium-Vitamin D-Vitamin K (Viactiv Calcium Plus D) 650-12.5-40 MG-MCG-MCG chewable tablet Chew 1 each 2 (two) times a day.      dilTIAZem (CARDIZEM) 30 MG tablet Take 1 tablet by mouth 2 (Two) Times a Day.      estradiol (ESTRACE) 0.1 MG/GM vaginal cream As Needed. Uses as needed      levothyroxine (SYNTHROID, LEVOTHROID) 25 MCG tablet TAKE 1 TABLET BY MOUTH EVERY  MORNING 100 tablet 2    multivitamin with minerals tablet tablet Take 1 tablet by mouth Daily.      omeprazole (priLOSEC) 20 MG capsule Take 1 capsule by mouth Daily.      polyethylene glycol (MiraLax) 17 g packet Take 17 g by mouth Daily.      rosuvastatin (CRESTOR) 20 MG tablet Take 1 tablet by mouth Daily. 90 tablet 3     No current facility-administered medications for this visit.     Assessment:       Diagnosis Plan   1. Paroxysmal atrial fibrillation        2. New onset atrial fibrillation  dilTIAZem (CARDIZEM) 30 MG tablet           No orders of the defined types were placed in this encounter.        Plan:       1.  73-year-old female with paroxysmal atrial fibrillation captured July " 2023.  She is on diltiazem 30 mg twice daily with very minimal palpitations that are nonlimiting.  She has CHADS2 Vascor of 5 which is elevated embolic risk.  She will continue Eliquis 5 mg twice daily.    -We discussed taking 1 additional diltiazem 30 mg tablet if systolic blood pressures greater than 104 significant palpitations.  She verbalized understanding  -She is not interested in ablation or alternative medicine at this time because she is stable.  I recommend 6-month follow-up with Dr. Alexander.  We can refer her back to Dr. Jack Hunt if her symptoms recur or worsen or if she is more interested in ablation.    2.  Nonischemic perfusion stress test March 2024   3.  Hypothyroidism - on replacement therapy  4.  Anxiety on therapy  5.  Diverticulitis May 2024-completed treatment  6.  Varicose veins bilateral lower extremity-no swelling issues.  Occasional cramping but stable.  No need for vascular surgery at this time  7.  Need for colonoscopy due to diverticulitis.  She is scheduled 9/19/2024 with Dr. Wilkerson.  She is cleared to proceed without additional testing and may hold Eliquis 48 hours prior.    Call with questions or concerns.           It has been a pleasure to participate in this patient's care.      Thank you,  MARCIO Castillo      **I used Dragon to dictate this note:**

## 2024-09-13 ENCOUNTER — OFFICE VISIT (OUTPATIENT)
Dept: INTERNAL MEDICINE | Facility: CLINIC | Age: 74
End: 2024-09-13
Payer: MEDICARE

## 2024-09-13 VITALS
OXYGEN SATURATION: 98 % | DIASTOLIC BLOOD PRESSURE: 78 MMHG | HEIGHT: 63 IN | WEIGHT: 134.4 LBS | HEART RATE: 73 BPM | RESPIRATION RATE: 16 BRPM | SYSTOLIC BLOOD PRESSURE: 116 MMHG | BODY MASS INDEX: 23.81 KG/M2 | TEMPERATURE: 98 F

## 2024-09-13 DIAGNOSIS — E03.9 HYPOTHYROIDISM, UNSPECIFIED TYPE: ICD-10-CM

## 2024-09-13 DIAGNOSIS — I48.91 NEW ONSET ATRIAL FIBRILLATION: ICD-10-CM

## 2024-09-13 DIAGNOSIS — M54.42 ACUTE LEFT-SIDED LOW BACK PAIN WITH LEFT-SIDED SCIATICA: Primary | ICD-10-CM

## 2024-09-13 DIAGNOSIS — M79.605 LEFT LEG PAIN: ICD-10-CM

## 2024-09-13 DIAGNOSIS — K21.9 GASTROESOPHAGEAL REFLUX DISEASE WITHOUT ESOPHAGITIS: ICD-10-CM

## 2024-09-13 DIAGNOSIS — E78.5 HYPERLIPIDEMIA, UNSPECIFIED HYPERLIPIDEMIA TYPE: ICD-10-CM

## 2024-09-13 DIAGNOSIS — I48.91 ATRIAL FIBRILLATION, UNSPECIFIED TYPE: ICD-10-CM

## 2024-09-13 PROCEDURE — G2211 COMPLEX E/M VISIT ADD ON: HCPCS | Performed by: FAMILY MEDICINE

## 2024-09-13 PROCEDURE — 1160F RVW MEDS BY RX/DR IN RCRD: CPT | Performed by: FAMILY MEDICINE

## 2024-09-13 PROCEDURE — 1159F MED LIST DOCD IN RCRD: CPT | Performed by: FAMILY MEDICINE

## 2024-09-13 PROCEDURE — 99214 OFFICE O/P EST MOD 30 MIN: CPT | Performed by: FAMILY MEDICINE

## 2024-09-13 PROCEDURE — 1125F AMNT PAIN NOTED PAIN PRSNT: CPT | Performed by: FAMILY MEDICINE

## 2024-09-13 RX ORDER — ROSUVASTATIN CALCIUM 20 MG/1
20 TABLET, COATED ORAL DAILY
Qty: 90 TABLET | Refills: 3 | Status: SHIPPED | OUTPATIENT
Start: 2024-09-13

## 2024-09-13 RX ORDER — DILTIAZEM HYDROCHLORIDE 30 MG/1
30 TABLET, FILM COATED ORAL 2 TIMES DAILY
Start: 2024-09-13

## 2024-09-13 RX ORDER — LEVOTHYROXINE SODIUM 25 UG/1
25 TABLET ORAL
Qty: 100 TABLET | Refills: 2 | Status: SHIPPED | OUTPATIENT
Start: 2024-09-13

## 2024-09-14 LAB
ALBUMIN SERPL-MCNC: 4.2 G/DL (ref 3.8–4.8)
ALP SERPL-CCNC: 67 IU/L (ref 44–121)
ALT SERPL-CCNC: 16 IU/L (ref 0–32)
AST SERPL-CCNC: 27 IU/L (ref 0–40)
BASOPHILS # BLD AUTO: 0 X10E3/UL (ref 0–0.2)
BASOPHILS NFR BLD AUTO: 1 %
BILIRUB SERPL-MCNC: 0.6 MG/DL (ref 0–1.2)
BUN SERPL-MCNC: 14 MG/DL (ref 8–27)
BUN/CREAT SERPL: 15 (ref 12–28)
CALCIUM SERPL-MCNC: 9.5 MG/DL (ref 8.7–10.3)
CHLORIDE SERPL-SCNC: 103 MMOL/L (ref 96–106)
CHOLEST SERPL-MCNC: 174 MG/DL (ref 100–199)
CO2 SERPL-SCNC: 24 MMOL/L (ref 20–29)
CREAT SERPL-MCNC: 0.93 MG/DL (ref 0.57–1)
EGFRCR SERPLBLD CKD-EPI 2021: 64 ML/MIN/1.73
EOSINOPHIL # BLD AUTO: 0.1 X10E3/UL (ref 0–0.4)
EOSINOPHIL NFR BLD AUTO: 1 %
ERYTHROCYTE [DISTWIDTH] IN BLOOD BY AUTOMATED COUNT: 12.3 % (ref 11.7–15.4)
FT4I SERPL CALC-MCNC: 2.3 (ref 1.2–4.9)
GLOBULIN SER CALC-MCNC: 2.8 G/DL (ref 1.5–4.5)
GLUCOSE SERPL-MCNC: 77 MG/DL (ref 70–99)
HCT VFR BLD AUTO: 43.1 % (ref 34–46.6)
HDLC SERPL-MCNC: 56 MG/DL
HGB BLD-MCNC: 13.8 G/DL (ref 11.1–15.9)
IMM GRANULOCYTES # BLD AUTO: 0 X10E3/UL (ref 0–0.1)
IMM GRANULOCYTES NFR BLD AUTO: 0 %
LDLC SERPL CALC-MCNC: 93 MG/DL (ref 0–99)
LDLC/HDLC SERPL: 1.7 RATIO (ref 0–3.2)
LYMPHOCYTES # BLD AUTO: 1.3 X10E3/UL (ref 0.7–3.1)
LYMPHOCYTES NFR BLD AUTO: 31 %
MCH RBC QN AUTO: 30.6 PG (ref 26.6–33)
MCHC RBC AUTO-ENTMCNC: 32 G/DL (ref 31.5–35.7)
MCV RBC AUTO: 96 FL (ref 79–97)
MONOCYTES # BLD AUTO: 0.4 X10E3/UL (ref 0.1–0.9)
MONOCYTES NFR BLD AUTO: 10 %
NEUTROPHILS # BLD AUTO: 2.4 X10E3/UL (ref 1.4–7)
NEUTROPHILS NFR BLD AUTO: 57 %
PLATELET # BLD AUTO: 222 X10E3/UL (ref 150–450)
POTASSIUM SERPL-SCNC: 4.3 MMOL/L (ref 3.5–5.2)
PROT SERPL-MCNC: 7 G/DL (ref 6–8.5)
RBC # BLD AUTO: 4.51 X10E6/UL (ref 3.77–5.28)
SODIUM SERPL-SCNC: 141 MMOL/L (ref 134–144)
T3RU NFR SERPL: 23 % (ref 24–39)
T4 SERPL-MCNC: 9.9 UG/DL (ref 4.5–12)
TRIGL SERPL-MCNC: 144 MG/DL (ref 0–149)
TSH SERPL DL<=0.005 MIU/L-ACNC: 2.72 UIU/ML (ref 0.45–4.5)
VLDLC SERPL CALC-MCNC: 25 MG/DL (ref 5–40)
WBC # BLD AUTO: 4.3 X10E3/UL (ref 3.4–10.8)

## 2024-09-15 NOTE — PROGRESS NOTES
"Chief Complaint  Back Pain (Lt back and knee pain since 3 months.)    Subjective          Penny Brown presents to [facility] PRIMARY CARE  History of Present Illness  The patient is a 73-year-old female who presents for evaluation of left knee pain.    She began experiencing pain behind her left knee in 07/2024, which has progressively worsened. The pain, primarily located behind her knee and extending into her thigh, occasionally radiates to her lower back. She rates the current pain level as 5 out of 10. The onset of the pain was not associated with any specific incident, but she recalls an episode at a fair in 08/2024 where she lost balance and landed on her left leg, exacerbating the pain. Since then, the pain has intensified, causing her to limp due to discomfort when bearing weight on the left leg. Despite being active throughout the summer, she reports no swelling or redness in the affected area. Her cardiologist suggested that her varicose veins might be contributing to the pain. She also experiences leg cramps and believes that increased physical activity since the beginning of the year may have contributed to her condition.    She has resumed taking omeprazole, which has been effective in managing her heartburn.    She has a colonoscopy scheduled on Wednesday and will need to stop taking her blood thinner on Monday.    Objective   Vital Signs:   /78 (BP Location: Right arm, Patient Position: Sitting, Cuff Size: Adult)   Pulse 73   Temp 98 °F (36.7 °C) (Oral)   Resp 16   Ht 160 cm (62.99\")   Wt 61 kg (134 lb 6.4 oz)   SpO2 98%   BMI 23.81 kg/m²     Physical Exam  Vitals and nursing note reviewed.   Constitutional:       Appearance: She is well-developed.   HENT:      Head: Normocephalic and atraumatic.   Musculoskeletal:      Cervical back: Normal range of motion and neck supple.   Neurological:      Mental Status: She is alert and oriented to person, place, and time. "   Psychiatric:         Behavior: Behavior normal.         Physical Exam       Result Review :                 Assessment and Plan    Diagnoses and all orders for this visit:    1. Acute left-sided low back pain with left-sided sciatica (Primary)  -     Ambulatory Referral to Physical Therapy for Evaluation & Treatment  -     Ambulatory Referral to Sports Medicine    2. Left leg pain  -     US Venous Doppler Lower Extremity Left (duplex)  -     Duplex Venous Lower Extremity - Left CAR  -     Ambulatory Referral to Physical Therapy for Evaluation & Treatment  -     Ambulatory Referral to Sports Medicine    3. Atrial fibrillation, unspecified type  -     apixaban (ELIQUIS) 5 MG tablet tablet; Take 1 tablet by mouth Every 12 (Twelve) Hours for 360 days.  Dispense: 180 tablet; Refill: 3    4. New onset atrial fibrillation  -     dilTIAZem (CARDIZEM) 30 MG tablet; Take 1 tablet by mouth 2 (Two) Times a Day.    5. Hypothyroidism, unspecified type  -     levothyroxine (SYNTHROID, LEVOTHROID) 25 MCG tablet; Take 1 tablet by mouth Every Morning.  Dispense: 100 tablet; Refill: 2  -     Thyroid Panel With TSH    6. Hyperlipidemia, unspecified hyperlipidemia type  -     rosuvastatin (CRESTOR) 20 MG tablet; Take 1 tablet by mouth Daily.  Dispense: 90 tablet; Refill: 3  -     CBC & Differential  -     Comprehensive Metabolic Panel  -     Lipid Panel With LDL / HDL Ratio    7. Gastroesophageal reflux disease without esophagitis  -     omeprazole (priLOSEC) 20 MG capsule; Take 1 capsule by mouth Daily.  Dispense: 90 capsule; Refill: 1      Assessment & Plan  1. Left knee pain.  The pain, which began behind the left knee and has since radiated up the thigh, is likely musculoskeletal in nature. There is no swelling or redness, reducing the likelihood of Deep Vein Thrombosis (DVT). However, due to the presence of varicose veins, superficial thrombophlebitis cannot be ruled out. An ultrasound (Doppler) will be ordered to exclude DVT.  Blood work will also be conducted. A referral to physical therapy and sports medicine at [place] will be made. If the ultrasound is negative for DVT, the pain is likely originating from the back.    We were able to get her an appointment later this afternoon to rule out a DVT.  She will go to [facility] for this ultrasound.  My suspicion for DVT is very low given the fact that there is no swelling or tenderness, and that she is already on a blood thinner like Eliquis.    2. Heartburn.  A prescription for omeprazole will be sent to \[facility]". The patient reports that omeprazole has been effective in managing her heartburn symptoms.    3. Medication Management.  She is currently taking Eliquis 5 mg twice a day, diltiazem 30 mg daily, levothyroxine 25 mcg daily, and Crestor 20 mg daily. These medications will be continued as prescribed.    4. Health Maintenance.  She has a scheduled colonoscopy on Wednesday and will stop taking her blood thinner (Eliquis) on Monday in preparation for the procedure. She has been reminded about the flu vaccine but has chosen not to receive it today due to upcoming birthday parties and a previous reaction.        Follow Up   No follow-ups on file.  Patient was given instructions and counseling regarding her condition or for health maintenance advice. Please see specific information pulled into the AVS if appropriate.           Patient or patient representative verbalized consent for the use of Ambient Listening during the visit with  Julian Garg MD for chart documentation. 9/15/2024  09:20 EDT

## 2024-09-17 DIAGNOSIS — R73.03 PREDIABETES: Primary | ICD-10-CM

## 2024-09-18 ENCOUNTER — ANESTHESIA (OUTPATIENT)
Dept: GASTROENTEROLOGY | Facility: HOSPITAL | Age: 74
End: 2024-09-18
Payer: MEDICARE

## 2024-09-18 ENCOUNTER — HOSPITAL ENCOUNTER (OUTPATIENT)
Facility: HOSPITAL | Age: 74
Setting detail: HOSPITAL OUTPATIENT SURGERY
Discharge: HOME | End: 2024-09-18
Attending: SURGERY | Admitting: SURGERY
Payer: MEDICARE

## 2024-09-18 ENCOUNTER — ANESTHESIA EVENT (OUTPATIENT)
Dept: GASTROENTEROLOGY | Facility: HOSPITAL | Age: 74
End: 2024-09-18
Payer: MEDICARE

## 2024-09-18 VITALS
RESPIRATION RATE: 16 BRPM | SYSTOLIC BLOOD PRESSURE: 129 MMHG | DIASTOLIC BLOOD PRESSURE: 75 MMHG | BODY MASS INDEX: 23.71 KG/M2 | HEART RATE: 52 BPM | OXYGEN SATURATION: 98 % | WEIGHT: 133.8 LBS | HEIGHT: 63 IN

## 2024-09-18 PROCEDURE — S0260 H&P FOR SURGERY: HCPCS | Performed by: SURGERY

## 2024-09-18 PROCEDURE — G0121 COLON CA SCRN NOT HI RSK IND: HCPCS | Performed by: SURGERY

## 2024-09-18 PROCEDURE — 25010000002 PROPOFOL 10 MG/ML EMULSION: Performed by: NURSE ANESTHETIST, CERTIFIED REGISTERED

## 2024-09-18 PROCEDURE — 25810000003 LACTATED RINGERS PER 1000 ML: Performed by: SURGERY

## 2024-09-18 RX ORDER — LIDOCAINE HYDROCHLORIDE 10 MG/ML
0.5 INJECTION, SOLUTION INFILTRATION; PERINEURAL ONCE AS NEEDED
Status: DISCONTINUED | OUTPATIENT
Start: 2024-09-18 | End: 2024-09-18 | Stop reason: HOSPADM

## 2024-09-18 RX ORDER — PROPOFOL 10 MG/ML
VIAL (ML) INTRAVENOUS AS NEEDED
Status: DISCONTINUED | OUTPATIENT
Start: 2024-09-18 | End: 2024-09-18 | Stop reason: SURG

## 2024-09-18 RX ORDER — SODIUM CHLORIDE, SODIUM LACTATE, POTASSIUM CHLORIDE, CALCIUM CHLORIDE 600; 310; 30; 20 MG/100ML; MG/100ML; MG/100ML; MG/100ML
1000 INJECTION, SOLUTION INTRAVENOUS CONTINUOUS
Status: DISCONTINUED | OUTPATIENT
Start: 2024-09-18 | End: 2024-09-18 | Stop reason: HOSPADM

## 2024-09-18 RX ORDER — LIDOCAINE HYDROCHLORIDE 20 MG/ML
INJECTION, SOLUTION INFILTRATION; PERINEURAL AS NEEDED
Status: DISCONTINUED | OUTPATIENT
Start: 2024-09-18 | End: 2024-09-18 | Stop reason: SURG

## 2024-09-18 RX ORDER — SODIUM CHLORIDE 0.9 % (FLUSH) 0.9 %
10 SYRINGE (ML) INJECTION AS NEEDED
Status: DISCONTINUED | OUTPATIENT
Start: 2024-09-18 | End: 2024-09-18 | Stop reason: HOSPADM

## 2024-09-18 RX ADMIN — LIDOCAINE HYDROCHLORIDE 50 MG: 20 INJECTION, SOLUTION INFILTRATION; PERINEURAL at 14:20

## 2024-09-18 RX ADMIN — SODIUM CHLORIDE, POTASSIUM CHLORIDE, SODIUM LACTATE AND CALCIUM CHLORIDE 1000 ML: 600; 310; 30; 20 INJECTION, SOLUTION INTRAVENOUS at 13:12

## 2024-09-18 RX ADMIN — PROPOFOL 50 MG: 10 INJECTION, EMULSION INTRAVENOUS at 14:20

## 2024-09-19 ENCOUNTER — TELEPHONE (OUTPATIENT)
Dept: INTERNAL MEDICINE | Facility: CLINIC | Age: 74
End: 2024-09-19
Payer: MEDICARE

## 2024-09-20 LAB — HBA1C MFR BLD: 6.1 % (ref 4.8–5.6)

## 2024-09-21 ENCOUNTER — APPOINTMENT (OUTPATIENT)
Dept: GENERAL RADIOLOGY | Facility: HOSPITAL | Age: 74
End: 2024-09-21
Payer: MEDICARE

## 2024-09-21 ENCOUNTER — HOSPITAL ENCOUNTER (OUTPATIENT)
Facility: HOSPITAL | Age: 74
Setting detail: OBSERVATION
Discharge: HOME | End: 2024-09-22
Attending: EMERGENCY MEDICINE | Admitting: STUDENT IN AN ORGANIZED HEALTH CARE EDUCATION/TRAINING PROGRAM
Payer: MEDICARE

## 2024-09-21 DIAGNOSIS — R26.2 UNABLE TO AMBULATE: ICD-10-CM

## 2024-09-21 DIAGNOSIS — M25.562 POSTERIOR LEFT KNEE PAIN: ICD-10-CM

## 2024-09-21 DIAGNOSIS — M25.562 ACUTE PAIN OF LEFT KNEE: Primary | ICD-10-CM

## 2024-09-21 LAB
ALBUMIN SERPL-MCNC: 4.2 G/DL (ref 3.5–5.2)
ALBUMIN/GLOB SERPL: 1.6 G/DL
ALP SERPL-CCNC: 68 U/L (ref 39–117)
ALT SERPL W P-5'-P-CCNC: 19 U/L (ref 1–33)
ANION GAP SERPL CALCULATED.3IONS-SCNC: 10 MMOL/L (ref 5–15)
AST SERPL-CCNC: 32 U/L (ref 1–32)
BASOPHILS # BLD AUTO: 0.01 10*3/MM3 (ref 0–0.2)
BASOPHILS NFR BLD AUTO: 0.2 % (ref 0–1.5)
BILIRUB SERPL-MCNC: 0.6 MG/DL (ref 0–1.2)
BUN SERPL-MCNC: 12 MG/DL (ref 8–23)
BUN/CREAT SERPL: 13.6 (ref 7–25)
CALCIUM SPEC-SCNC: 9.1 MG/DL (ref 8.6–10.5)
CHLORIDE SERPL-SCNC: 106 MMOL/L (ref 98–107)
CO2 SERPL-SCNC: 27 MMOL/L (ref 22–29)
CREAT SERPL-MCNC: 0.88 MG/DL (ref 0.57–1)
DEPRECATED RDW RBC AUTO: 43.3 FL (ref 37–54)
EGFRCR SERPLBLD CKD-EPI 2021: 68.2 ML/MIN/1.73
EOSINOPHIL # BLD AUTO: 0.03 10*3/MM3 (ref 0–0.4)
EOSINOPHIL NFR BLD AUTO: 0.7 % (ref 0.3–6.2)
ERYTHROCYTE [DISTWIDTH] IN BLOOD BY AUTOMATED COUNT: 12.4 % (ref 12.3–15.4)
GLOBULIN UR ELPH-MCNC: 2.7 GM/DL
GLUCOSE SERPL-MCNC: 92 MG/DL (ref 65–99)
HCT VFR BLD AUTO: 40.5 % (ref 34–46.6)
HGB BLD-MCNC: 13.4 G/DL (ref 12–15.9)
IMM GRANULOCYTES # BLD AUTO: 0.02 10*3/MM3 (ref 0–0.05)
IMM GRANULOCYTES NFR BLD AUTO: 0.5 % (ref 0–0.5)
LYMPHOCYTES # BLD AUTO: 0.97 10*3/MM3 (ref 0.7–3.1)
LYMPHOCYTES NFR BLD AUTO: 24 % (ref 19.6–45.3)
MCH RBC QN AUTO: 31.6 PG (ref 26.6–33)
MCHC RBC AUTO-ENTMCNC: 33.1 G/DL (ref 31.5–35.7)
MCV RBC AUTO: 95.5 FL (ref 79–97)
MONOCYTES # BLD AUTO: 0.32 10*3/MM3 (ref 0.1–0.9)
MONOCYTES NFR BLD AUTO: 7.9 % (ref 5–12)
NEUTROPHILS NFR BLD AUTO: 2.7 10*3/MM3 (ref 1.7–7)
NEUTROPHILS NFR BLD AUTO: 66.7 % (ref 42.7–76)
NRBC BLD AUTO-RTO: 0 /100 WBC (ref 0–0.2)
PLATELET # BLD AUTO: 191 10*3/MM3 (ref 140–450)
PMV BLD AUTO: 9.7 FL (ref 6–12)
POTASSIUM SERPL-SCNC: 3.6 MMOL/L (ref 3.5–5.2)
PROT SERPL-MCNC: 6.9 G/DL (ref 6–8.5)
RBC # BLD AUTO: 4.24 10*6/MM3 (ref 3.77–5.28)
SODIUM SERPL-SCNC: 143 MMOL/L (ref 136–145)
WBC NRBC COR # BLD AUTO: 4.05 10*3/MM3 (ref 3.4–10.8)

## 2024-09-21 PROCEDURE — 99285 EMERGENCY DEPT VISIT HI MDM: CPT

## 2024-09-21 PROCEDURE — 96375 TX/PRO/DX INJ NEW DRUG ADDON: CPT

## 2024-09-21 PROCEDURE — G0378 HOSPITAL OBSERVATION PER HR: HCPCS

## 2024-09-21 PROCEDURE — 25010000002 MORPHINE PER 10 MG: Performed by: EMERGENCY MEDICINE

## 2024-09-21 PROCEDURE — 80053 COMPREHEN METABOLIC PANEL: CPT | Performed by: PHYSICIAN ASSISTANT

## 2024-09-21 PROCEDURE — 96374 THER/PROPH/DIAG INJ IV PUSH: CPT

## 2024-09-21 PROCEDURE — 25010000002 DIPHENHYDRAMINE PER 50 MG

## 2024-09-21 PROCEDURE — 73560 X-RAY EXAM OF KNEE 1 OR 2: CPT

## 2024-09-21 PROCEDURE — 85025 COMPLETE CBC W/AUTO DIFF WBC: CPT | Performed by: PHYSICIAN ASSISTANT

## 2024-09-21 RX ORDER — HYDROCODONE BITARTRATE AND ACETAMINOPHEN 5; 325 MG/1; MG/1
1 TABLET ORAL EVERY 6 HOURS PRN
Status: DISCONTINUED | OUTPATIENT
Start: 2024-09-21 | End: 2024-09-22

## 2024-09-21 RX ORDER — METHOCARBAMOL 750 MG/1
750 TABLET, FILM COATED ORAL 4 TIMES DAILY
Status: DISCONTINUED | OUTPATIENT
Start: 2024-09-21 | End: 2024-09-22 | Stop reason: HOSPADM

## 2024-09-21 RX ORDER — SODIUM CHLORIDE 0.9 % (FLUSH) 0.9 %
10 SYRINGE (ML) INJECTION EVERY 12 HOURS SCHEDULED
Status: DISCONTINUED | OUTPATIENT
Start: 2024-09-21 | End: 2024-09-22 | Stop reason: HOSPADM

## 2024-09-21 RX ORDER — DIPHENHYDRAMINE HYDROCHLORIDE 50 MG/ML
25 INJECTION INTRAMUSCULAR; INTRAVENOUS ONCE
Status: COMPLETED | OUTPATIENT
Start: 2024-09-21 | End: 2024-09-21

## 2024-09-21 RX ORDER — PANTOPRAZOLE SODIUM 40 MG/1
40 TABLET, DELAYED RELEASE ORAL
Status: DISCONTINUED | OUTPATIENT
Start: 2024-09-22 | End: 2024-09-22 | Stop reason: HOSPADM

## 2024-09-21 RX ORDER — MORPHINE SULFATE 2 MG/ML
4 INJECTION, SOLUTION INTRAMUSCULAR; INTRAVENOUS ONCE
Status: COMPLETED | OUTPATIENT
Start: 2024-09-21 | End: 2024-09-21

## 2024-09-21 RX ORDER — SODIUM CHLORIDE 0.9 % (FLUSH) 0.9 %
10 SYRINGE (ML) INJECTION AS NEEDED
Status: DISCONTINUED | OUTPATIENT
Start: 2024-09-21 | End: 2024-09-22 | Stop reason: HOSPADM

## 2024-09-21 RX ORDER — SODIUM CHLORIDE 9 MG/ML
40 INJECTION, SOLUTION INTRAVENOUS AS NEEDED
Status: DISCONTINUED | OUTPATIENT
Start: 2024-09-21 | End: 2024-09-22 | Stop reason: HOSPADM

## 2024-09-21 RX ORDER — DILTIAZEM HYDROCHLORIDE 30 MG/1
30 TABLET, FILM COATED ORAL 2 TIMES DAILY
Status: DISCONTINUED | OUTPATIENT
Start: 2024-09-21 | End: 2024-09-22 | Stop reason: HOSPADM

## 2024-09-21 RX ORDER — DIPHENHYDRAMINE HYDROCHLORIDE 50 MG/ML
INJECTION INTRAMUSCULAR; INTRAVENOUS
Status: COMPLETED
Start: 2024-09-21 | End: 2024-09-21

## 2024-09-21 RX ORDER — ROSUVASTATIN CALCIUM 20 MG/1
20 TABLET, COATED ORAL DAILY
Status: DISCONTINUED | OUTPATIENT
Start: 2024-09-21 | End: 2024-09-22 | Stop reason: HOSPADM

## 2024-09-21 RX ORDER — LEVOTHYROXINE SODIUM 25 UG/1
25 TABLET ORAL
Status: DISCONTINUED | OUTPATIENT
Start: 2024-09-22 | End: 2024-09-22 | Stop reason: HOSPADM

## 2024-09-21 RX ADMIN — HYDROCODONE BITARTRATE AND ACETAMINOPHEN 1 TABLET: 5; 325 TABLET ORAL at 16:47

## 2024-09-21 RX ADMIN — MORPHINE SULFATE 2 MG: 2 INJECTION, SOLUTION INTRAMUSCULAR; INTRAVENOUS at 14:15

## 2024-09-21 RX ADMIN — METHOCARBAMOL TABLETS 750 MG: 750 TABLET, COATED ORAL at 20:48

## 2024-09-21 RX ADMIN — DIPHENHYDRAMINE HYDROCHLORIDE 25 MG: 50 INJECTION INTRAMUSCULAR; INTRAVENOUS at 14:32

## 2024-09-21 RX ADMIN — DILTIAZEM HYDROCHLORIDE 30 MG: 30 TABLET, FILM COATED ORAL at 20:48

## 2024-09-21 RX ADMIN — DIPHENHYDRAMINE HYDROCHLORIDE 25 MG: 50 INJECTION, SOLUTION INTRAMUSCULAR; INTRAVENOUS at 14:32

## 2024-09-21 RX ADMIN — Medication 10 ML: at 20:48

## 2024-09-22 ENCOUNTER — READMISSION MANAGEMENT (OUTPATIENT)
Dept: CALL CENTER | Facility: HOSPITAL | Age: 74
End: 2024-09-22
Payer: MEDICARE

## 2024-09-22 ENCOUNTER — APPOINTMENT (OUTPATIENT)
Dept: MRI IMAGING | Facility: HOSPITAL | Age: 74
End: 2024-09-22
Payer: MEDICARE

## 2024-09-22 VITALS
DIASTOLIC BLOOD PRESSURE: 76 MMHG | HEIGHT: 62 IN | WEIGHT: 134.92 LBS | RESPIRATION RATE: 17 BRPM | TEMPERATURE: 97.9 F | HEART RATE: 65 BPM | OXYGEN SATURATION: 96 % | BODY MASS INDEX: 24.83 KG/M2 | SYSTOLIC BLOOD PRESSURE: 115 MMHG

## 2024-09-22 PROBLEM — M25.562 LEFT KNEE PAIN: Status: RESOLVED | Noted: 2024-09-21 | Resolved: 2024-09-22

## 2024-09-22 PROCEDURE — 97162 PT EVAL MOD COMPLEX 30 MIN: CPT

## 2024-09-22 PROCEDURE — G0378 HOSPITAL OBSERVATION PER HR: HCPCS

## 2024-09-22 PROCEDURE — 97110 THERAPEUTIC EXERCISES: CPT

## 2024-09-22 PROCEDURE — 73721 MRI JNT OF LWR EXTRE W/O DYE: CPT

## 2024-09-22 RX ORDER — HYDROCODONE BITARTRATE AND ACETAMINOPHEN 5; 325 MG/1; MG/1
1 TABLET ORAL EVERY 6 HOURS PRN
Qty: 10 TABLET | Refills: 0 | Status: SHIPPED | OUTPATIENT
Start: 2024-09-22

## 2024-09-22 RX ORDER — HYDROCODONE BITARTRATE AND ACETAMINOPHEN 5; 325 MG/1; MG/1
1 TABLET ORAL EVERY 4 HOURS PRN
Status: DISCONTINUED | OUTPATIENT
Start: 2024-09-22 | End: 2024-09-22 | Stop reason: HOSPADM

## 2024-09-22 RX ADMIN — HYDROCODONE BITARTRATE AND ACETAMINOPHEN 1 TABLET: 5; 325 TABLET ORAL at 12:26

## 2024-09-22 RX ADMIN — DILTIAZEM HYDROCHLORIDE 30 MG: 30 TABLET, FILM COATED ORAL at 08:32

## 2024-09-22 RX ADMIN — HYDROCODONE BITARTRATE AND ACETAMINOPHEN 1 TABLET: 5; 325 TABLET ORAL at 00:48

## 2024-09-22 RX ADMIN — Medication 10 ML: at 08:33

## 2024-09-22 RX ADMIN — PANTOPRAZOLE SODIUM 40 MG: 40 TABLET, DELAYED RELEASE ORAL at 05:17

## 2024-09-22 RX ADMIN — HYDROCODONE BITARTRATE AND ACETAMINOPHEN 1 TABLET: 5; 325 TABLET ORAL at 18:32

## 2024-09-22 RX ADMIN — METHOCARBAMOL TABLETS 750 MG: 750 TABLET, COATED ORAL at 12:26

## 2024-09-22 RX ADMIN — METHOCARBAMOL TABLETS 750 MG: 750 TABLET, COATED ORAL at 08:32

## 2024-09-22 RX ADMIN — LEVOTHYROXINE SODIUM 25 MCG: 25 TABLET ORAL at 05:17

## 2024-09-22 RX ADMIN — ROSUVASTATIN CALCIUM 20 MG: 20 TABLET, FILM COATED ORAL at 08:32

## 2024-09-23 ENCOUNTER — TRANSITIONAL CARE MANAGEMENT TELEPHONE ENCOUNTER (OUTPATIENT)
Dept: CALL CENTER | Facility: HOSPITAL | Age: 74
End: 2024-09-23
Payer: MEDICARE

## 2024-09-23 ENCOUNTER — TELEPHONE (OUTPATIENT)
Dept: INTERNAL MEDICINE | Facility: CLINIC | Age: 74
End: 2024-09-23
Payer: MEDICARE

## 2024-10-16 ENCOUNTER — TELEPHONE (OUTPATIENT)
Dept: CARDIOLOGY | Facility: CLINIC | Age: 74
End: 2024-10-16
Payer: MEDICARE

## 2024-10-16 NOTE — TELEPHONE ENCOUNTER
Dian    Pt called back with her B/P readings and some more information:    8:30 pm last night she took her evening dose of diltiazem  1:30 am she took another dose of diltiazem  B/P readings she took while on the phone with me: 100/68, 115/75, 92/73  It's time for her to take her morning dose of diltiazem. Should she take just 30 mg or any at all?    Thank you,    Judy Morales, RN  Triage LC  10/16/24 09:18 EDT

## 2024-10-16 NOTE — TELEPHONE ENCOUNTER
Patient called because at 1:00AM her apple watch detected afib. She went ahead and took 30mg of diltiazem and went back to bed. This morning she is still in afib HR . She is also complaining of a slight headache. She takes diltiazem 30mg BID, but per AL's last office note, she told the patient she may take an additional tablet as long as her SBP is >104. She is wondering if she should just go ahead and take a total of 60mg of diltiazem this morning since she is still in afib. I told her we really need to know what her BP is running. She is going to try and find her BP cuff and then she is going to call back with her reading.     If SBP is >104, do you want her to take 60mg of diltiazem or just her normal 30mg?    Karrie Santiago RN  Triage [facility]

## 2024-10-16 NOTE — TELEPHONE ENCOUNTER
Notified pt of recommendations from Dian. Pt verbalized understanding.    Thank you,    Judy Morales, RN  Triage LC  10/16/24 09:40 EDT

## 2024-11-04 DIAGNOSIS — I48.91 NEW ONSET ATRIAL FIBRILLATION: ICD-10-CM

## 2024-11-04 RX ORDER — DILTIAZEM HYDROCHLORIDE 30 MG/1
30 TABLET, FILM COATED ORAL 2 TIMES DAILY
Qty: 180 TABLET | Refills: 1 | Status: SHIPPED | OUTPATIENT
Start: 2024-11-04

## 2024-11-17 DIAGNOSIS — K21.9 GASTROESOPHAGEAL REFLUX DISEASE WITHOUT ESOPHAGITIS: ICD-10-CM

## 2025-01-10 DIAGNOSIS — E78.5 HYPERLIPIDEMIA, UNSPECIFIED HYPERLIPIDEMIA TYPE: ICD-10-CM

## 2025-01-10 DIAGNOSIS — K21.9 GASTROESOPHAGEAL REFLUX DISEASE WITHOUT ESOPHAGITIS: ICD-10-CM

## 2025-01-10 DIAGNOSIS — I48.91 ATRIAL FIBRILLATION, UNSPECIFIED TYPE: ICD-10-CM

## 2025-01-10 DIAGNOSIS — I48.91 NEW ONSET ATRIAL FIBRILLATION: ICD-10-CM

## 2025-01-10 DIAGNOSIS — E03.9 HYPOTHYROIDISM, UNSPECIFIED TYPE: ICD-10-CM

## 2025-01-10 NOTE — TELEPHONE ENCOUNTER
Caller: Stephanie Penny ISLA    Relationship: Self    Best call back number: [phone]     Requested Prescriptions:   Requested Prescriptions     Pending Prescriptions Disp Refills    omeprazole (priLOSEC) 20 MG capsule 100 capsule 2     Sig: Take 1 capsule by mouth Daily.    rosuvastatin (CRESTOR) 20 MG tablet 90 tablet 3     Sig: Take 1 tablet by mouth Daily.    levothyroxine (SYNTHROID, LEVOTHROID) 25 MCG tablet 100 tablet 2     Sig: Take 1 tablet by mouth Every Morning.    dilTIAZem (CARDIZEM) 30 MG tablet 180 tablet 1     Sig: Take 1 tablet by mouth 2 (Two) Times a Day.    apixaban (ELIQUIS) 5 MG tablet tablet 180 tablet 3     Sig: Take 1 tablet by mouth Every 12 (Twelve) Hours for 360 days.        Pharmacy where request should be sent: [facility], [address] [phone] [facility] [phone]      Last office visit with prescribing clinician: 9/13/2024   Last telemedicine visit with prescribing clinician: Visit date not found   Next office visit with prescribing clinician: Visit date not found     Additional details provided by patient: PATIENT IS ESTABLISHED WITH NEW INSURANCE MAIL ORDER PHARMACY AND NEEDS NEW PRESCRIPTIONS SENT OVER    Does the patient have less than a 3 day supply:  [] Yes  [x] No    Would you like a call back once the refill request has been completed: [x] Yes [] No    If the office needs to give you a call back, can they leave a voicemail: [x] Yes [] No    Felix Elmore Rep   01/10/25 12:31 EST

## 2025-01-12 DIAGNOSIS — I48.91 NEW ONSET ATRIAL FIBRILLATION: ICD-10-CM

## 2025-01-13 RX ORDER — DILTIAZEM HYDROCHLORIDE 30 MG/1
30 TABLET, FILM COATED ORAL 2 TIMES DAILY
Qty: 200 TABLET | Refills: 2 | OUTPATIENT
Start: 2025-01-13

## 2025-01-13 RX ORDER — LEVOTHYROXINE SODIUM 25 UG/1
25 TABLET ORAL
Qty: 100 TABLET | Refills: 2 | Status: SHIPPED | OUTPATIENT
Start: 2025-01-13

## 2025-01-13 RX ORDER — ROSUVASTATIN CALCIUM 20 MG/1
20 TABLET, COATED ORAL DAILY
Qty: 90 TABLET | Refills: 3 | Status: SHIPPED | OUTPATIENT
Start: 2025-01-13

## 2025-01-13 RX ORDER — DILTIAZEM HYDROCHLORIDE 30 MG/1
30 TABLET, FILM COATED ORAL 2 TIMES DAILY
Qty: 180 TABLET | Refills: 1 | OUTPATIENT
Start: 2025-01-13

## 2025-03-03 ENCOUNTER — TELEPHONE (OUTPATIENT)
Dept: INTERNAL MEDICINE | Facility: CLINIC | Age: 75
End: 2025-03-03

## 2025-03-03 NOTE — TELEPHONE ENCOUNTER
Caller: Penny Brown    Relationship: Self    Best call back number: [phone]     What is the best time to reach you: ANY    Who are you requesting to speak with (clinical staff, provider,  specific staff member): PCP        What was the call regarding: PATIENT IS WANTING TO KNOW IF PCP ADVISES GETTING A MEASLES TITER TEST AND IF THAT IS SOMETHING THE OFFICE WOULD OFFER. PLEASE CALL TO ADVISE    Is it okay if the provider responds through MyChart: PHONE CALL

## 2025-03-04 DIAGNOSIS — Z00.00 HEALTHCARE MAINTENANCE: Primary | ICD-10-CM

## 2025-03-10 ENCOUNTER — LAB (OUTPATIENT)
Dept: LAB | Facility: HOSPITAL | Age: 75
End: 2025-03-10
Payer: MEDICARE

## 2025-03-10 PROCEDURE — 86765 RUBEOLA ANTIBODY: CPT | Performed by: FAMILY MEDICINE

## 2025-03-10 PROCEDURE — 86735 MUMPS ANTIBODY: CPT | Performed by: FAMILY MEDICINE

## 2025-03-10 PROCEDURE — 86762 RUBELLA ANTIBODY: CPT | Performed by: FAMILY MEDICINE

## 2025-03-11 DIAGNOSIS — I48.91 NEW ONSET ATRIAL FIBRILLATION: ICD-10-CM

## 2025-03-11 RX ORDER — DILTIAZEM HYDROCHLORIDE 30 MG/1
30 TABLET, FILM COATED ORAL 2 TIMES DAILY
Qty: 180 TABLET | Refills: 1 | Status: SHIPPED | OUTPATIENT
Start: 2025-03-11 | End: 2025-03-12 | Stop reason: SDUPTHER

## 2025-03-12 DIAGNOSIS — I48.91 NEW ONSET ATRIAL FIBRILLATION: ICD-10-CM

## 2025-03-12 LAB
MEV IGG SER IA-ACNC: >300 AU/ML
MUV IGG SER IA-ACNC: >300 AU/ML
RUBV IGG SERPL IA-ACNC: 24 INDEX

## 2025-03-12 RX ORDER — DILTIAZEM HYDROCHLORIDE 30 MG/1
30 TABLET, FILM COATED ORAL 2 TIMES DAILY
Qty: 28 TABLET | Refills: 0 | Status: SHIPPED | OUTPATIENT
Start: 2025-03-12

## 2025-03-19 ENCOUNTER — OFFICE VISIT (OUTPATIENT)
Dept: CARDIOLOGY | Facility: CLINIC | Age: 75
End: 2025-03-19
Payer: MEDICARE

## 2025-03-19 ENCOUNTER — OFFICE VISIT (OUTPATIENT)
Dept: INTERNAL MEDICINE | Facility: CLINIC | Age: 75
End: 2025-03-19
Payer: MEDICARE

## 2025-03-19 VITALS
SYSTOLIC BLOOD PRESSURE: 120 MMHG | HEIGHT: 62 IN | WEIGHT: 139.2 LBS | HEART RATE: 64 BPM | OXYGEN SATURATION: 98 % | DIASTOLIC BLOOD PRESSURE: 74 MMHG | BODY MASS INDEX: 25.62 KG/M2

## 2025-03-19 VITALS
SYSTOLIC BLOOD PRESSURE: 120 MMHG | TEMPERATURE: 97.9 F | RESPIRATION RATE: 16 BRPM | HEART RATE: 67 BPM | OXYGEN SATURATION: 98 % | DIASTOLIC BLOOD PRESSURE: 74 MMHG | HEIGHT: 62 IN | BODY MASS INDEX: 25.58 KG/M2 | WEIGHT: 139 LBS

## 2025-03-19 DIAGNOSIS — I48.91 NEW ONSET ATRIAL FIBRILLATION: ICD-10-CM

## 2025-03-19 DIAGNOSIS — Z79.01 LONG TERM (CURRENT) USE OF ANTICOAGULANTS: ICD-10-CM

## 2025-03-19 DIAGNOSIS — N39.0 ACUTE UTI: Primary | ICD-10-CM

## 2025-03-19 DIAGNOSIS — E78.2 MIXED HYPERLIPIDEMIA: ICD-10-CM

## 2025-03-19 DIAGNOSIS — I48.0 PAROXYSMAL ATRIAL FIBRILLATION: Primary | ICD-10-CM

## 2025-03-19 RX ORDER — CEFDINIR 300 MG/1
300 CAPSULE ORAL 2 TIMES DAILY
Qty: 14 CAPSULE | Refills: 0 | Status: SHIPPED | OUTPATIENT
Start: 2025-03-19 | End: 2025-03-26

## 2025-03-19 RX ORDER — DILTIAZEM HYDROCHLORIDE 30 MG/1
30 TABLET, FILM COATED ORAL 2 TIMES DAILY
Qty: 180 TABLET | Refills: 3 | Status: SHIPPED | OUTPATIENT
Start: 2025-03-19

## 2025-03-19 NOTE — PROGRESS NOTES
"      CARDIOLOGY    Katherine Alexander MD    ENCOUNTER DATE:  03/19/2025    Penny Brown / 74 y.o. / female        CHIEF COMPLAINT / REASON FOR OFFICE VISIT     Follow-up (6 month)      HISTORY OF PRESENT ILLNESS       HPI    Penny Brown is a 74 y.o. female     This is a lady I saw in the past with some palpitations. Vascular screening in 05/2022 showed plaque without stenosis bilaterally. No abdominal aortic aneurysm or peripheral arterial disease. A 48-hour monitor in 05/2022 was normal. Nuclear stress test 03/2023 was normal. Event monitor 05/2023 was normal and symptoms correlated to sinus rhythm or short bursts of SVT. In 07/2023 she had tachycardia, palpitations, and dyspnea, and was diagnosed with atrial fibrillation with rapid ventricular response. She converted on her own after about 5 hours. She follows with MARCIO Fay, and Dr. Abbott.     She is doing well.  She had a bout of atrial fibrillation last year but has not had anything recently.  No bleeding issues.    VITAL SIGNS     Visit Vitals  /74   Pulse 64   Ht 157.5 cm (62\")   Wt 63.1 kg (139 lb 3.2 oz)   SpO2 98%   BMI 25.46 kg/m²         Wt Readings from Last 3 Encounters:   03/19/25 63.1 kg (139 lb 3.2 oz)   09/21/24 61.2 kg (134 lb 14.7 oz)   09/18/24 60.7 kg (133 lb 12.8 oz)     Body mass index is 25.46 kg/m².      PHYSICAL EXAMINATION     Constitutional:       General: Not in acute distress.  Neck:      Vascular: No carotid bruit or JVD.   Pulmonary:      Effort: Pulmonary effort is normal.      Breath sounds: Normal breath sounds.   Cardiovascular:      Normal rate. Regular rhythm.      Murmurs: There is no murmur.   Psychiatric:         Mood and Affect: Mood and affect normal.           REVIEWED DATA       ECG 12 Lead    Date/Time: 3/19/2025 10:08 AM  Performed by: Katherine Alexander MD    Authorized by: Katherine Alexander MD  Comparison: compared with previous ECG from 4/29/2024  Similar to previous ECG  Rhythm: sinus " rhythm  BPM: 64  Conduction: conduction normal  ST Segments: ST segments normal  T Waves: T waves normal    Clinical impression: normal ECG            Lipid Panel          9/13/2024    11:39   Lipid Panel   Total Cholesterol 174    Triglycerides 144    HDL Cholesterol 56    VLDL Cholesterol 25    LDL Cholesterol  93    LDL/HDL Ratio 1.7        Lab Results   Component Value Date    GLUCOSE 92 09/21/2024    BUN 12 09/21/2024    CREATININE 0.88 09/21/2024     09/21/2024    K 3.6 09/21/2024     09/21/2024    CALCIUM 9.1 09/21/2024    PROTEINTOT 6.9 09/21/2024    ALBUMIN 4.2 09/21/2024    ALT 19 09/21/2024    AST 32 09/21/2024    ALKPHOS 68 09/21/2024    BILITOT 0.6 09/21/2024    GLOB 2.7 09/21/2024    AGRATIO 1.6 09/21/2024    BCR 13.6 09/21/2024    ANIONGAP 10.0 09/21/2024    EGFR 68.2 09/21/2024       ASSESSMENT & PLAN      Diagnosis Plan   1. Paroxysmal atrial fibrillation        2. Mixed hyperlipidemia        3. Long term (current) use of anticoagulants        4. New onset atrial fibrillation  dilTIAZem (CARDIZEM) 30 MG tablet        Paroxysmal atrial fibrillation.  She is anticoagulated with Eliquis.  She has no bleeding side effects.  She takes 30 mg of diltiazem twice a day.  At this point I am going to have her follow-up in a year with Dian.  We can reinvolve Dr. Abbott and Ruben if she starts having more frequent A-fib and is interested in medication or ablation.  Hypothyroid, on replacement.  Hyperlipidemia, on rosuvastatin.   Varicose vein       Follow-up with Dian in 1 year.      Orders Placed This Encounter   Procedures    ECG 12 Lead     This order was created via procedure documentation     Release to patient:   Routine Release [[id number]]           MEDICATIONS         Discharge Medications            Accurate as of March 19, 2025 10:10 AM. If you have any questions, ask your nurse or doctor.                Continue These Medications        Instructions Start Date   apixaban 5 MG tablet  tablet  Commonly known as: ELIQUIS   5 mg, Oral, Every 12 Hours Scheduled      dilTIAZem 30 MG tablet  Commonly known as: CARDIZEM   30 mg, Oral, 2 Times Daily      estradiol 0.1 MG/GM vaginal cream  Commonly known as: ESTRACE   Insert 4 g into the vagina As Needed (DRYNESS). Uses as needed      HYDROcodone-acetaminophen 5-325 MG per tablet  Commonly known as: NORCO   1 tablet, Oral, Every 6 Hours PRN      levothyroxine 25 MCG tablet  Commonly known as: SYNTHROID, LEVOTHROID   25 mcg, Oral, Every Early Morning      MiraLax 17 g packet  Generic drug: polyethylene glycol   17 g, Daily      multivitamin with minerals tablet tablet   1 tablet, Daily      omeprazole 20 MG capsule  Commonly known as: priLOSEC   20 mg, Oral, Daily      rosuvastatin 20 MG tablet  Commonly known as: CRESTOR   20 mg, Oral, Daily      Viactiv Calcium Plus D 650-12.5-40 MG-MCG chewable tablet  Generic drug: Calcium-Vitamin D-Vitamin K   1 each, 2 times daily                 Katherine Alexander MD  03/19/25  10:10 EDT    Part of this note may be an electronic transcription/translation of spoken language to printed text using the Dragon dictation system.

## 2025-03-20 LAB
APPEARANCE UR: CLEAR
BACTERIA #/AREA URNS HPF: NORMAL /[HPF]
BILIRUB UR QL STRIP: NEGATIVE
CASTS URNS QL MICRO: NORMAL /LPF
COLOR UR: YELLOW
EPI CELLS #/AREA URNS HPF: NORMAL /HPF (ref 0–10)
GLUCOSE UR QL STRIP: NEGATIVE
HGB UR QL STRIP: ABNORMAL
KETONES UR QL STRIP: NEGATIVE
LEUKOCYTE ESTERASE UR QL STRIP: ABNORMAL
MICRO URNS: ABNORMAL
NITRITE UR QL STRIP: NEGATIVE
PH UR STRIP: 5.5 [PH] (ref 5–7.5)
PROT UR QL STRIP: NEGATIVE
RBC #/AREA URNS HPF: NORMAL /HPF (ref 0–2)
SP GR UR STRIP: 1.02 (ref 1–1.03)
UROBILINOGEN UR STRIP-MCNC: 0.2 MG/DL (ref 0.2–1)
WBC #/AREA URNS HPF: NORMAL /HPF (ref 0–5)

## 2025-03-26 NOTE — PROGRESS NOTES
"Chief Complaint  Urinary Tract Infection (frequency)    Subjective          Penny Brown presents to [facility]  History of Present Illness  The patient is a 74-year-old female who came in because she's been having a lot of urgency and frequency with her urination. She mentioned that she needs to pee almost every hour, but she doesn't feel any stinging when she urinates. Instead, she feels a general discomfort due to the frequent need to go to the bathroom. She is not aware of any allergies to antibiotics.    ALLERGIES  The patient has no known allergies.    Objective   Vital Signs:   /74   Pulse 67   Temp 97.9 °F (36.6 °C) (Oral)   Resp 16   Ht 157.5 cm (62.01\")   Wt 63 kg (139 lb)   SpO2 98%   BMI 25.42 kg/m²     Physical Exam  Vitals and nursing note reviewed.   Constitutional:       Appearance: She is well-developed.   HENT:      Head: Normocephalic and atraumatic.   Musculoskeletal:      Cervical back: Normal range of motion and neck supple.   Neurological:      Mental Status: She is alert and oriented to person, place, and time.   Psychiatric:         Behavior: Behavior normal.         Physical Exam       Result Review :                 Assessment and Plan    Diagnoses and all orders for this visit:    1. Acute UTI (Primary)  -     cefdinir (OMNICEF) 300 MG capsule; Take 1 capsule by mouth 2 (Two) Times a Day for 7 days.  Dispense: 14 capsule; Refill: 0  -     Urinalysis With Microscopic - Urine, Clean Catch  -     Urine Culture - Urine, Urine, Clean Catch    Other orders  -     Microscopic Examination -      Assessment & Plan  1. Urinary tract infection (UTI).  She reports experiencing significant urinary urgency and frequency, necessitating urination approximately every hour. There is no burning sensation during urination but acknowledges a general discomfort associated with the frequent need to urinate. A urine culture will be conducted for further analysis. In " the interim, a prescription for Omnicef (cefdinir) has been sent to [facility]. She is advised to start the antibiotic immediately. If the culture results indicate that it is not a UTI, she should discontinue the medication.    Follow Up   No follow-ups on file.  Patient was given instructions and counseling regarding her condition or for health maintenance advice. Please see specific information pulled into the AVS if appropriate.           Patient or patient representative verbalized consent for the use of Ambient Listening during the visit with  Julian Garg MD for chart documentation. 3/26/2025  09:17 EDT

## 2025-04-15 ENCOUNTER — OFFICE VISIT (OUTPATIENT)
Dept: INTERNAL MEDICINE | Facility: CLINIC | Age: 75
End: 2025-04-15
Payer: MEDICARE

## 2025-04-15 VITALS
BODY MASS INDEX: 25.93 KG/M2 | SYSTOLIC BLOOD PRESSURE: 110 MMHG | HEART RATE: 62 BPM | HEIGHT: 62 IN | OXYGEN SATURATION: 99 % | WEIGHT: 140.9 LBS | TEMPERATURE: 98 F | DIASTOLIC BLOOD PRESSURE: 76 MMHG

## 2025-04-15 DIAGNOSIS — I48.91 ATRIAL FIBRILLATION, UNSPECIFIED TYPE: ICD-10-CM

## 2025-04-15 DIAGNOSIS — R73.03 PREDIABETES: ICD-10-CM

## 2025-04-15 DIAGNOSIS — F32.A DEPRESSION, UNSPECIFIED DEPRESSION TYPE: ICD-10-CM

## 2025-04-15 DIAGNOSIS — K21.9 GASTROESOPHAGEAL REFLUX DISEASE WITHOUT ESOPHAGITIS: ICD-10-CM

## 2025-04-15 DIAGNOSIS — E03.9 HYPOTHYROIDISM, UNSPECIFIED TYPE: ICD-10-CM

## 2025-04-15 DIAGNOSIS — Z00.00 MEDICARE ANNUAL WELLNESS VISIT, SUBSEQUENT: ICD-10-CM

## 2025-04-15 DIAGNOSIS — Z78.0 POSTMENOPAUSAL: ICD-10-CM

## 2025-04-15 DIAGNOSIS — Z00.00 WELL WOMAN EXAM (NO GYNECOLOGICAL EXAM): Primary | ICD-10-CM

## 2025-04-15 DIAGNOSIS — Z00.00 HEALTHCARE MAINTENANCE: ICD-10-CM

## 2025-04-15 DIAGNOSIS — I48.91 NEW ONSET ATRIAL FIBRILLATION: ICD-10-CM

## 2025-04-15 DIAGNOSIS — E78.5 HYPERLIPIDEMIA, UNSPECIFIED HYPERLIPIDEMIA TYPE: ICD-10-CM

## 2025-04-15 DIAGNOSIS — Z12.31 ENCOUNTER FOR SCREENING MAMMOGRAM FOR MALIGNANT NEOPLASM OF BREAST: ICD-10-CM

## 2025-04-15 DIAGNOSIS — N39.41 URGE INCONTINENCE: ICD-10-CM

## 2025-04-15 RX ORDER — OXYBUTYNIN CHLORIDE 5 MG/1
5 TABLET, EXTENDED RELEASE ORAL DAILY
Qty: 90 TABLET | Refills: 3 | Status: SHIPPED | OUTPATIENT
Start: 2025-04-15

## 2025-04-15 RX ORDER — OMEPRAZOLE 20 MG/1
20 CAPSULE, DELAYED RELEASE ORAL DAILY
Qty: 100 CAPSULE | Refills: 2 | Status: SHIPPED | OUTPATIENT
Start: 2025-04-15

## 2025-04-15 RX ORDER — DILTIAZEM HYDROCHLORIDE 30 MG/1
30 TABLET, FILM COATED ORAL 2 TIMES DAILY
Qty: 180 TABLET | Refills: 3 | Status: SHIPPED | OUTPATIENT
Start: 2025-04-15

## 2025-04-15 RX ORDER — ROSUVASTATIN CALCIUM 20 MG/1
20 TABLET, COATED ORAL DAILY
Qty: 90 TABLET | Refills: 3 | Status: SHIPPED | OUTPATIENT
Start: 2025-04-15

## 2025-04-15 RX ORDER — LEVOTHYROXINE SODIUM 25 UG/1
25 TABLET ORAL
Qty: 100 TABLET | Refills: 3 | Status: SHIPPED | OUTPATIENT
Start: 2025-04-15

## 2025-04-15 NOTE — PROGRESS NOTES
Subjective   The ABCs of the Annual Wellness Visit  Medicare Wellness Visit      Penny Brown is a 74 y.o. patient who presents for a Medicare Wellness Visit.    The following portions of the patient's history were reviewed and   updated as appropriate: allergies, current medications, past family history, past medical history, past social history, past surgical history, and problem list.    Compared to one year ago, the patient's physical   health is worse.  Compared to one year ago, the patient's mental   health is worse.    Recent Hospitalizations:  This patient has had a [facility] admission record on file within the last 365 days.  Current Medical Providers:  Patient Care Team:  Julian Garg MD as PCP - General (Family Medicine)  Mario Goel MD as Consulting Physician (Gastroenterology)    Outpatient Medications Prior to Visit   Medication Sig Dispense Refill    Calcium-Vitamin D-Vitamin K (Viactiv Calcium Plus D) 650-12.5-40 MG-MCG-MCG chewable tablet Chew 1 each 2 (two) times a day.      estradiol (ESTRACE) 0.1 MG/GM vaginal cream Insert 4 g into the vagina As Needed (DRYNESS). Uses as needed      HYDROcodone-acetaminophen (NORCO) 5-325 MG per tablet Take 1 tablet by mouth Every 6 (Six) Hours As Needed for Moderate Pain. 10 tablet 0    multivitamin with minerals tablet tablet Take 1 tablet by mouth Daily.      polyethylene glycol (MiraLax) 17 g packet Take 17 g by mouth Daily.      apixaban (ELIQUIS) 5 MG tablet tablet Take 1 tablet by mouth Every 12 (Twelve) Hours for 360 days. 180 tablet 3    dilTIAZem (CARDIZEM) 30 MG tablet Take 1 tablet by mouth 2 (Two) Times a Day. 180 tablet 3    levothyroxine (SYNTHROID, LEVOTHROID) 25 MCG tablet Take 1 tablet by mouth Every Morning. 100 tablet 2    omeprazole (priLOSEC) 20 MG capsule Take 1 capsule by mouth Daily. 100 capsule 2    rosuvastatin (CRESTOR) 20 MG tablet Take 1 tablet by mouth Daily. 90 tablet 3     No facility-administered medications  "prior to visit.     Opioid medication/s are on active medication list.  and I have evaluated her active treatment plan and pain score trends (see table).  Vitals:    04/15/25 1040   PainSc: 0-No pain     I have reviewed the chart for potential of high risk medication and harmful drug interactions in the elderly.        Aspirin is not on active medication list.  Aspirin use is not indicated based on review of current medical condition/s. Risk of harm outweighs potential benefits.  .    Patient Active Problem List   Diagnosis    Arcus senilis of both corneas    Cystocele    Epiphora due to insufficient drainage of right side    Incomplete emptying of bladder    OAB (overactive bladder)    Senile cataracts of both eyes    Uterovaginal prolapse, incomplete    Hypothyroidism    Mixed hyperlipidemia    Prediabetes    Leg cramps    Vertigo    Hypotension due to hypovolemia    Anxiety    Paroxysmal atrial fibrillation    Dysfunction of right eustachian tube    Diverticulitis    Screen for colon cancer    Left knee pain    Long term (current) use of anticoagulants     Advance Care Planning Advance Directive is not on file.  ACP discussion was held with the patient during this visit. Patient has an advance directive (not in EMR), copy requested.            Objective   Vitals:    04/15/25 1040   BP: 110/76   BP Location: Right arm   Patient Position: Sitting   Pulse: 62   Temp: 98 °F (36.7 °C)   TempSrc: Oral   SpO2: 99%   Weight: 63.9 kg (140 lb 14.4 oz)   Height: 157.5 cm (62.01\")   PainSc: 0-No pain       Estimated body mass index is 25.76 kg/m² as calculated from the following:    Height as of this encounter: 157.5 cm (62.01\").    Weight as of this encounter: 63.9 kg (140 lb 14.4 oz).                Does the patient have evidence of cognitive impairment? No                                                                                               Health  Risk Assessment    Smoking Status:  Social History     Tobacco Use "   Smoking Status Never    Passive exposure: Past (parents smoked in the home)   Smokeless Tobacco Never     Alcohol Consumption:  Social History     Substance and Sexual Activity   Alcohol Use Yes    Alcohol/week: 1.0 standard drink of alcohol    Types: 1 Drinks containing 0.5 oz of alcohol per week    Comment: Infrequent       Fall Risk Screen  YOSEPH Fall Risk Assessment was completed, and patient is at MODERATE risk for falls. Assessment completed on:4/15/2025    Depression Screening   Little interest or pleasure in doing things? Not at all   Feeling down, depressed, or hopeless? Several days   PHQ-2 Total Score 1      Health Habits and Functional and Cognitive Screenin/15/2025    10:44 AM   Functional & Cognitive Status   Do you have difficulty preparing food and eating? No   Do you have difficulty bathing yourself, getting dressed or grooming yourself? No   Do you have difficulty using the toilet? No   Do you have difficulty moving around from place to place? No   Do you have trouble with steps or getting out of a bed or a chair? No   Current Diet Frequent Junk Food   Dental Exam Up to date   Eye Exam Up to date   Exercise (times per week) 3 times per week   Current Exercises Include Walking;Other        Exercise Comment yoga   Do you need help using the phone?  No   Are you deaf or do you have serious difficulty hearing?  No   Do you need help to go to places out of walking distance? No   Do you need help shopping? No   Do you need help preparing meals?  No   Do you need help with housework?  No   Do you need help with laundry? No   Do you need help taking your medications? No   Do you need help managing money? No   Do you ever drive or ride in a car without wearing a seat belt? No   Have you felt unusual stress, anger or loneliness in the last month? Yes   Who do you live with? Other   If you need help, do you have trouble finding someone available to you? No   Have you been bothered in the last four  weeks by sexual problems? No   Do you have difficulty concentrating, remembering or making decisions? Yes           Age-appropriate Screening Schedule:  Refer to the list below for future screening recommendations based on patient's age, sex and/or medical conditions. Orders for these recommended tests are listed in the plan section. The patient has been provided with a written plan.    Health Maintenance List  Health Maintenance   Topic Date Due    PAP SMEAR  01/05/2023    RSV Vaccine - Adults (1 - 1-dose 75+ series) Never done    DXA SCAN  04/25/2024    ANNUAL WELLNESS VISIT  02/26/2025    MAMMOGRAM  01/17/2025    COVID-19 Vaccine (5 - 2024-25 season) 07/16/2025    LIPID PANEL  09/13/2025    TDAP/TD VACCINES (3 - Td or Tdap) 08/27/2029    COLORECTAL CANCER SCREENING  09/18/2034    HEPATITIS C SCREENING  Completed    Pneumococcal Vaccine 50+  Completed    ZOSTER VACCINE  Completed    INFLUENZA VACCINE  Discontinued                                                                                                                                                CMS Preventative Services Quick Reference  Risk Factors Identified During Encounter  None Identified    The above risks/problems have been discussed with the patient.  Pertinent information has been shared with the patient in the After Visit Summary.  An After Visit Summary and PPPS were made available to the patient.    Follow Up:   Next Medicare Wellness visit to be scheduled in 1 year.     Assessment & Plan  Well woman exam (no gynecological exam)         Medicare annual wellness visit, subsequent         Healthcare maintenance    Orders:    CBC & Differential    Comprehensive Metabolic Panel    Hemoglobin A1c    Thyroid Panel With TSH    Lipid Panel With LDL / HDL Ratio    Depression, unspecified depression type           Hypothyroidism, unspecified type    Orders:    levothyroxine (SYNTHROID, LEVOTHROID) 25 MCG tablet; Take 1 tablet by mouth Every Morning.     Thyroid Panel With TSH    Hyperlipidemia, unspecified hyperlipidemia type       Orders:    rosuvastatin (CRESTOR) 20 MG tablet; Take 1 tablet by mouth Daily.    Lipid Panel With LDL / HDL Ratio    Atrial fibrillation, unspecified type    Orders:    apixaban (ELIQUIS) 5 MG tablet tablet; Take 1 tablet by mouth Every 12 (Twelve) Hours for 360 days.    CBC & Differential    Comprehensive Metabolic Panel    New onset atrial fibrillation    Orders:    dilTIAZem (CARDIZEM) 30 MG tablet; Take 1 tablet by mouth 2 (Two) Times a Day.    Gastroesophageal reflux disease without esophagitis    Orders:    omeprazole (priLOSEC) 20 MG capsule; Take 1 capsule by mouth Daily.    Postmenopausal    Orders:    DEXA Bone Density Axial    Encounter for screening mammogram for malignant neoplasm of breast    Orders:    Mammo Screening Digital Tomosynthesis Bilateral With CAD; Future    Prediabetes    Orders:    Hemoglobin A1c    Urge incontinence    Orders:    Urinalysis With Microscopic - Urine, Clean Catch    oxybutynin XL (Ditropan XL) 5 MG 24 hr tablet; Take 1 tablet by mouth Daily.         Follow Up:   No follow-ups on file.

## 2025-04-15 NOTE — PROGRESS NOTES
"Chief Complaint  Medicare Wellness-subsequent    Subjective          Penny Brown presents to [facility]  History of Present Illness  The patient came in for evaluation of several issues including feeling depressed, managing her hypothyroidism, cholesterol, atrial fibrillation, heartburn, frequent and urgent urination, and weight concerns.    She reports feeling depressed due to a recent emotional event. She prefers to manage her mental health through talking to someone or working things out herself rather than taking medication. She feels her physical and mental health have gotten worse compared to last year but doesn't report any cognitive issues.    She is currently taking levothyroxine 25 mcg daily for her low thyroid.    For her cholesterol, she takes rosuvastatin 20 mg daily.    She takes Eliquis 5 mg twice a day and diltiazem 30 mg twice a day for her atrial fibrillation and recently got a refill of Eliquis.    She is due for a mammogram and is up-to-date with her colonoscopy.    She experiences frequent and urgent urination but doesn't think she has a urinary tract infection. She tried Kegel exercises but stopped because they didn't seem to help. She also has stress incontinence.    She acknowledges being overweight, which she attributes to eating more ice cream because of her depression. She had a knee injury in September 2024 that resulted in a fracture and meniscus tear, which limited her ability to exercise. She has recently started being active again.    She takes omeprazole for heartburn.    She has advanced directives, including a living will, in place.    Objective   Vital Signs:   /76 (BP Location: Right arm, Patient Position: Sitting)   Pulse 62   Temp 98 °F (36.7 °C) (Oral)   Ht 157.5 cm (62.01\")   Wt 63.9 kg (140 lb 14.4 oz)   SpO2 99%   BMI 25.76 kg/m²     Physical Exam  Vitals and nursing note reviewed.   Constitutional:       Appearance: She is " well-developed.   HENT:      Head: Normocephalic and atraumatic.      Right Ear: External ear normal.      Left Ear: External ear normal.   Cardiovascular:      Rate and Rhythm: Normal rate and regular rhythm.      Heart sounds: Normal heart sounds.   Pulmonary:      Effort: Pulmonary effort is normal. No respiratory distress.      Breath sounds: Normal breath sounds.   Abdominal:      Palpations: Abdomen is soft.      Tenderness: There is no abdominal tenderness. There is no guarding.   Musculoskeletal:         General: Normal range of motion.      Cervical back: Normal range of motion and neck supple.   Lymphadenopathy:      Cervical: No cervical adenopathy.   Skin:     General: Skin is warm.   Neurological:      Mental Status: She is alert and oriented to person, place, and time.   Psychiatric:         Behavior: Behavior normal.         Physical Exam       Result Review :                 Assessment and Plan    Diagnoses and all orders for this visit:    1. Well woman exam (no gynecological exam) (Primary)    2. Medicare annual wellness visit, subsequent    3. Healthcare maintenance  -     CBC & Differential  -     Comprehensive Metabolic Panel  -     Hemoglobin A1c  -     Thyroid Panel With TSH  -     Lipid Panel With LDL / HDL Ratio    4. Depression, unspecified depression type    5. Hypothyroidism, unspecified type  Assessment & Plan:    Orders:    levothyroxine (SYNTHROID, LEVOTHROID) 25 MCG tablet; Take 1 tablet by mouth Every Morning.    Thyroid Panel With TSH      Orders:  -     levothyroxine (SYNTHROID, LEVOTHROID) 25 MCG tablet; Take 1 tablet by mouth Every Morning.  Dispense: 100 tablet; Refill: 3  -     Thyroid Panel With TSH    6. Hyperlipidemia, unspecified hyperlipidemia type  -     rosuvastatin (CRESTOR) 20 MG tablet; Take 1 tablet by mouth Daily.  Dispense: 90 tablet; Refill: 3  -     Lipid Panel With LDL / HDL Ratio    7. Atrial fibrillation, unspecified type  -     apixaban (ELIQUIS) 5 MG tablet  tablet; Take 1 tablet by mouth Every 12 (Twelve) Hours for 360 days.  Dispense: 180 tablet; Refill: 3  -     CBC & Differential  -     Comprehensive Metabolic Panel    8. New onset atrial fibrillation  -     dilTIAZem (CARDIZEM) 30 MG tablet; Take 1 tablet by mouth 2 (Two) Times a Day.  Dispense: 180 tablet; Refill: 3    9. Gastroesophageal reflux disease without esophagitis  -     omeprazole (priLOSEC) 20 MG capsule; Take 1 capsule by mouth Daily.  Dispense: 100 capsule; Refill: 2    10. Postmenopausal  -     DEXA Bone Density Axial    11. Encounter for screening mammogram for malignant neoplasm of breast  -     Mammo Screening Digital Tomosynthesis Bilateral With CAD; Future    12. Prediabetes  Assessment & Plan:    Orders:    Hemoglobin A1c      Orders:  -     Hemoglobin A1c    13. Urge incontinence  -     Urinalysis With Microscopic - Urine, Clean Catch  -     oxybutynin XL (Ditropan XL) 5 MG 24 hr tablet; Take 1 tablet by mouth Daily.  Dispense: 90 tablet; Refill: 3      Assessment & Plan  1. Depression.  - Reports feeling depressed but prefers not to add another prescription to her list.  - A referral to a therapist was provided to help manage her depression.  - Discussed the benefits of therapy and the patient's past success with non-pharmacological interventions.  - Encouraged to contact the therapist for further support.    2. Hypothyroidism.  - Currently taking levothyroxine 25 mcg daily.  - No new symptoms reported, indicating stable condition.  - A refill for levothyroxine 25 mcg was sent to [facility].  - Continued monitoring of thyroid function recommended.    3. Hypercholesterolemia.  - Currently taking rosuvastatin 20 mg daily.  - No new symptoms reported, indicating stable condition.  - A refill for rosuvastatin 20 mg was sent to [facility].  - Continued monitoring of lipid levels recommended.    4. Atrial Fibrillation.  - Currently taking Eliquis 5 mg twice a day and diltiazem 30 mg  twice a day.  - No new symptoms reported, indicating stable condition.  - Refills for both medications were sent to [facility].  - Continued monitoring of heart rate and rhythm recommended.    5. Heartburn.  - Currently taking omeprazole.  - No new symptoms reported, indicating stable condition.  - A refill for omeprazole was sent to [facility].  - Continued monitoring of gastrointestinal symptoms recommended.    6. Urge Incontinence.  - Experiences urinary urgency and frequency.  - No signs of UTI reported.  - A prescription for Ditropan (oxybutynin) was provided, with instructions to start at the lowest dose and adjust as needed based on tolerance and effectiveness.  - Referral to a urologist was offered for further evaluation and management.    7. Health Maintenance.  - Due for a bone density scan and a mammogram.  - Orders for both tests were placed.  - Patient was informed about the scheduling process and advised to follow up.    8. Weight Management.  - Current BMI is 25.8.  - Advised to aim for a weight loss of 5 pounds.  - Discussed the importance of regular exercise and healthy eating habits.  - Encouraged to resume physical activity following recovery from knee injury.    Follow Up   No follow-ups on file.  Patient was given instructions and counseling regarding her condition or for health maintenance advice. Please see specific information pulled into the AVS if appropriate.           Patient or patient representative verbalized consent for the use of Ambient Listening during the visit with  Julian Garg MD for chart documentation. 4/15/2025  12:40 EDT

## 2025-04-15 NOTE — PROGRESS NOTES
Subjective   Penny Brown is a 74 y.o. female and is here for a comprehensive physical exam. The patient reports no problems.    Pt is due with annual gyn exam and mammo           Social History:   Social History     Socioeconomic History    Marital status:     Number of children: 1   Tobacco Use    Smoking status: Never     Passive exposure: Past (parents smoked in the home)    Smokeless tobacco: Never   Vaping Use    Vaping status: Never Used   Substance and Sexual Activity    Alcohol use: Yes     Alcohol/week: 1.0 standard drink of alcohol     Types: 1 Drinks containing 0.5 oz of alcohol per week     Comment: Infrequent    Drug use: No    Sexual activity: Not Currently     Partners: Male     Birth control/protection: Post-menopausal       Family History:   Family History   Problem Relation Age of Onset    Heart failure Mother     Thyroid disease Mother     Heart disease Mother     Lung disease Brother     Asthma Brother         half brother    Alcohol abuse Maternal Aunt     Cancer Maternal Aunt     Breast cancer Maternal Aunt     Alcohol abuse Maternal Aunt     Early death Maternal Aunt         aneurysm    Alcohol abuse Maternal Uncle     Cancer Maternal Uncle     Diabetes Maternal Uncle     Alcohol abuse Maternal Uncle     Diabetes Maternal Uncle     Alcohol abuse Maternal Uncle     Diabetes Maternal Uncle     Diabetes Maternal Grandmother     Ovarian cancer Neg Hx     Malig Hyperthermia Neg Hx        Past Medical History:   Past Medical History:   Diagnosis Date    Abnormal ECG June 30, 2023    taken to ER by EMS-A-Fib, faint, nausea, headache    Anticoagulated     FOR THE A FIB. HELD FOR PROCEDURE    Anxiety     Diverticulitis of colon     Dizziness     Fibrocystic breast     GERD (gastroesophageal reflux disease)     History of atrial fibrillation     DR STREETER    Hyperlipidemia     Hypertension 2/15/24    180/101-Middle of night    Hypothyroidism     PAD (peripheral artery disease)     Prediabetes      Screen for colon cancer 06/06/2024    Seizures     as a child; LAST SEIZURE AT AGE 28    Sleep apnea     UNABLE TO USE MACHINE. MILD. MD STATES DID NOT NEED TO USE    Slow to wake up after anesthesia     Syncopal episodes        The following portions of the patient's history were reviewed and updated as appropriate: allergies, current medications, past family history, past medical history, past social history, past surgical history and problem list.    Review of Systems    Review of Systems   Constitutional:  Negative for chills and fever.   HENT:  Negative for congestion, rhinorrhea, sinus pain and sore throat.    Eyes:  Negative for photophobia and visual disturbance.   Respiratory:  Negative for cough, chest tightness and shortness of breath.    Cardiovascular:  Negative for chest pain and palpitations.   Gastrointestinal:  Negative for diarrhea, nausea and vomiting.   Genitourinary:  Negative for dysuria, frequency and urgency.   Skin:  Negative for rash and wound.   Neurological:  Negative for dizziness and syncope.   Psychiatric/Behavioral:  Negative for behavioral problems and confusion.        Objective   Physical Exam  Vitals and nursing note reviewed.   Constitutional:       Appearance: She is well-developed.   HENT:      Head: Normocephalic and atraumatic.      Right Ear: External ear normal.      Left Ear: External ear normal.   Cardiovascular:      Rate and Rhythm: Normal rate and regular rhythm.      Heart sounds: Normal heart sounds.   Pulmonary:      Effort: Pulmonary effort is normal. No respiratory distress.      Breath sounds: Normal breath sounds.   Abdominal:      Palpations: Abdomen is soft.      Tenderness: There is no abdominal tenderness. There is no guarding.   Musculoskeletal:         General: Normal range of motion.      Cervical back: Normal range of motion and neck supple.   Lymphadenopathy:      Cervical: No cervical adenopathy.   Skin:     General: Skin is warm.   Neurological:       Mental Status: She is alert and oriented to person, place, and time.   Psychiatric:         Behavior: Behavior normal.         Vitals:    04/15/25 1040   BP: 110/76   Pulse: 62   Temp: 98 °F (36.7 °C)   SpO2: 99%     Body mass index is 25.76 kg/m².      Medications:   Current Outpatient Medications:     apixaban (ELIQUIS) 5 MG tablet tablet, Take 1 tablet by mouth Every 12 (Twelve) Hours for 360 days., Disp: 180 tablet, Rfl: 3    Calcium-Vitamin D-Vitamin K (Viactiv Calcium Plus D) 650-12.5-40 MG-MCG-MCG chewable tablet, Chew 1 each 2 (two) times a day., Disp: , Rfl:     dilTIAZem (CARDIZEM) 30 MG tablet, Take 1 tablet by mouth 2 (Two) Times a Day., Disp: 180 tablet, Rfl: 3    estradiol (ESTRACE) 0.1 MG/GM vaginal cream, Insert 4 g into the vagina As Needed (DRYNESS). Uses as needed, Disp: , Rfl:     HYDROcodone-acetaminophen (NORCO) 5-325 MG per tablet, Take 1 tablet by mouth Every 6 (Six) Hours As Needed for Moderate Pain., Disp: 10 tablet, Rfl: 0    levothyroxine (SYNTHROID, LEVOTHROID) 25 MCG tablet, Take 1 tablet by mouth Every Morning., Disp: 100 tablet, Rfl: 3    multivitamin with minerals tablet tablet, Take 1 tablet by mouth Daily., Disp: , Rfl:     omeprazole (priLOSEC) 20 MG capsule, Take 1 capsule by mouth Daily., Disp: 100 capsule, Rfl: 2    polyethylene glycol (MiraLax) 17 g packet, Take 17 g by mouth Daily., Disp: , Rfl:     rosuvastatin (CRESTOR) 20 MG tablet, Take 1 tablet by mouth Daily., Disp: 90 tablet, Rfl: 3    oxybutynin XL (Ditropan XL) 5 MG 24 hr tablet, Take 1 tablet by mouth Daily., Disp: 90 tablet, Rfl: 3       Assessment & Plan   Healthy female exam.      1. Healthcare Maintenance:  2. Patient Counseling:  --Nutrition: Stressed importance of moderation in sodium/caffeine intake, saturated fat and cholesterol, caloric balance, sufficient intake of fresh fruits, vegetables, fiber, calcium and vit D  --Exercise: Recommended 30 minutes of exercise daily.  --Immunizations  reviewed.  --Discussed benefits of screening colonoscopy.    Diagnoses and all orders for this visit:    Well woman exam (no gynecological exam)    Medicare annual wellness visit, subsequent    Healthcare maintenance  -     CBC & Differential  -     Comprehensive Metabolic Panel  -     Hemoglobin A1c  -     Thyroid Panel With TSH  -     Lipid Panel With LDL / HDL Ratio    Depression, unspecified depression type    Hypothyroidism, unspecified type  -     levothyroxine (SYNTHROID, LEVOTHROID) 25 MCG tablet; Take 1 tablet by mouth Every Morning.  -     Thyroid Panel With TSH    Hyperlipidemia, unspecified hyperlipidemia type  -     rosuvastatin (CRESTOR) 20 MG tablet; Take 1 tablet by mouth Daily.  -     Lipid Panel With LDL / HDL Ratio    Atrial fibrillation, unspecified type  -     apixaban (ELIQUIS) 5 MG tablet tablet; Take 1 tablet by mouth Every 12 (Twelve) Hours for 360 days.  -     CBC & Differential  -     Comprehensive Metabolic Panel    New onset atrial fibrillation  -     dilTIAZem (CARDIZEM) 30 MG tablet; Take 1 tablet by mouth 2 (Two) Times a Day.    Gastroesophageal reflux disease without esophagitis  -     omeprazole (priLOSEC) 20 MG capsule; Take 1 capsule by mouth Daily.    Postmenopausal  -     DEXA Bone Density Axial    Encounter for screening mammogram for malignant neoplasm of breast  -     Mammo Screening Digital Tomosynthesis Bilateral With CAD; Future    Prediabetes  -     Hemoglobin A1c    Urge incontinence  -     Urinalysis With Microscopic - Urine, Clean Catch  -     oxybutynin XL (Ditropan XL) 5 MG 24 hr tablet; Take 1 tablet by mouth Daily.        No follow-ups on file.             Dictated utilizing Dragon Voice Recognition Software

## 2025-04-15 NOTE — ASSESSMENT & PLAN NOTE
Orders:    levothyroxine (SYNTHROID, LEVOTHROID) 25 MCG tablet; Take 1 tablet by mouth Every Morning.    Thyroid Panel With TSH

## 2025-04-16 LAB
ALBUMIN SERPL-MCNC: 4.3 G/DL (ref 3.5–5.2)
ALBUMIN/GLOB SERPL: 1.5 G/DL
ALP SERPL-CCNC: 74 U/L (ref 39–117)
ALT SERPL-CCNC: 19 U/L (ref 1–33)
APPEARANCE UR: CLEAR
AST SERPL-CCNC: 30 U/L (ref 1–32)
BACTERIA #/AREA URNS HPF: NORMAL /HPF
BASOPHILS # BLD AUTO: 0.02 10*3/MM3 (ref 0–0.2)
BASOPHILS NFR BLD AUTO: 0.5 % (ref 0–1.5)
BILIRUB SERPL-MCNC: 0.5 MG/DL (ref 0–1.2)
BILIRUB UR QL STRIP: NEGATIVE
BUN SERPL-MCNC: 8 MG/DL (ref 8–23)
BUN/CREAT SERPL: 9.3 (ref 7–25)
CALCIUM SERPL-MCNC: 9.8 MG/DL (ref 8.6–10.5)
CASTS URNS MICRO: NORMAL
CHLORIDE SERPL-SCNC: 103 MMOL/L (ref 98–107)
CHOLEST SERPL-MCNC: 184 MG/DL (ref 0–200)
CO2 SERPL-SCNC: 28.6 MMOL/L (ref 22–29)
COLOR UR: YELLOW
CREAT SERPL-MCNC: 0.86 MG/DL (ref 0.57–1)
EGFRCR SERPLBLD CKD-EPI 2021: 70.1 ML/MIN/1.73
EOSINOPHIL # BLD AUTO: 0.07 10*3/MM3 (ref 0–0.4)
EOSINOPHIL NFR BLD AUTO: 1.9 % (ref 0.3–6.2)
EPI CELLS #/AREA URNS HPF: NORMAL /HPF
ERYTHROCYTE [DISTWIDTH] IN BLOOD BY AUTOMATED COUNT: 12.1 % (ref 12.3–15.4)
FT4I SERPL CALC-MCNC: 2.3 (ref 1.2–4.9)
GLOBULIN SER CALC-MCNC: 2.8 GM/DL
GLUCOSE SERPL-MCNC: 90 MG/DL (ref 65–99)
GLUCOSE UR QL STRIP: NEGATIVE
HBA1C MFR BLD: 6 % (ref 4.8–5.6)
HCT VFR BLD AUTO: 41.4 % (ref 34–46.6)
HDLC SERPL-MCNC: 58 MG/DL (ref 40–60)
HGB BLD-MCNC: 13.8 G/DL (ref 12–15.9)
HGB UR QL STRIP: ABNORMAL
IMM GRANULOCYTES # BLD AUTO: 0.01 10*3/MM3 (ref 0–0.05)
IMM GRANULOCYTES NFR BLD AUTO: 0.3 % (ref 0–0.5)
KETONES UR QL STRIP: NEGATIVE
LDLC SERPL CALC-MCNC: 105 MG/DL (ref 0–100)
LDLC/HDLC SERPL: 1.76 {RATIO}
LEUKOCYTE ESTERASE UR QL STRIP: ABNORMAL
LYMPHOCYTES # BLD AUTO: 1.32 10*3/MM3 (ref 0.7–3.1)
LYMPHOCYTES NFR BLD AUTO: 35 % (ref 19.6–45.3)
MCH RBC QN AUTO: 30.7 PG (ref 26.6–33)
MCHC RBC AUTO-ENTMCNC: 33.3 G/DL (ref 31.5–35.7)
MCV RBC AUTO: 92.2 FL (ref 79–97)
MONOCYTES # BLD AUTO: 0.4 10*3/MM3 (ref 0.1–0.9)
MONOCYTES NFR BLD AUTO: 10.6 % (ref 5–12)
NEUTROPHILS # BLD AUTO: 1.95 10*3/MM3 (ref 1.7–7)
NEUTROPHILS NFR BLD AUTO: 51.7 % (ref 42.7–76)
NITRITE UR QL STRIP: NEGATIVE
NRBC BLD AUTO-RTO: 0 /100 WBC (ref 0–0.2)
PH UR STRIP: 6.5 [PH] (ref 5–8)
PLATELET # BLD AUTO: 219 10*3/MM3 (ref 140–450)
POTASSIUM SERPL-SCNC: 4.3 MMOL/L (ref 3.5–5.2)
PROT SERPL-MCNC: 7.1 G/DL (ref 6–8.5)
PROT UR QL STRIP: NEGATIVE
RBC # BLD AUTO: 4.49 10*6/MM3 (ref 3.77–5.28)
RBC #/AREA URNS HPF: NORMAL /HPF
SODIUM SERPL-SCNC: 142 MMOL/L (ref 136–145)
SP GR UR STRIP: 1.01 (ref 1–1.03)
T3RU NFR SERPL: 23 % (ref 24–39)
T4 SERPL-MCNC: 9.8 UG/DL (ref 4.5–12)
TRIGL SERPL-MCNC: 120 MG/DL (ref 0–150)
TSH SERPL DL<=0.005 MIU/L-ACNC: 2.41 UIU/ML (ref 0.45–4.5)
UROBILINOGEN UR STRIP-MCNC: ABNORMAL MG/DL
VLDLC SERPL CALC-MCNC: 21 MG/DL (ref 5–40)
WBC # BLD AUTO: 3.77 10*3/MM3 (ref 3.4–10.8)
WBC #/AREA URNS HPF: NORMAL /HPF

## 2025-04-24 ENCOUNTER — TELEPHONE (OUTPATIENT)
Dept: CARDIOLOGY | Age: 75
End: 2025-04-24

## 2025-04-24 NOTE — TELEPHONE ENCOUNTER
Pt said that her PCP  prescribed Oxybutynin for her overactive bladder but as she did some research, she learned that there are some side effects that she feels may interact with her  Eliquis and Diltiazem.     For this reason, she would like to know if it is ok to take this or not.     I informed her that she could consult with the Pharmacist regarding this concern but she said that this was sent to [facility] so she could not speak with a Pharmacist.        Please Advise

## 2025-04-24 NOTE — TELEPHONE ENCOUNTER
I reviewed the medications on up-to-date and oxybutynin should not be a problem with her cardiac medications.

## 2025-04-26 ENCOUNTER — DOCUMENTATION (OUTPATIENT)
Dept: CARDIOLOGY | Age: 75
End: 2025-04-26
Payer: MEDICARE

## 2025-04-27 ENCOUNTER — TELEPHONE (OUTPATIENT)
Dept: CARDIOLOGY | Age: 75
End: 2025-04-27
Payer: MEDICARE

## 2025-04-27 NOTE — TELEPHONE ENCOUNTER
Patient called on-call service reporting she is still in A-fib with heart rate on average 107 bpm.  Systolic blood pressure 109.  She will take her usual morning dose of diltiazem and in 6 hours if she still has heart rate above 100 she may take an extra diltiazem dose in the middle of the day.  She will take her routine evening dose under the same parameters.  She will call with any issues.  I discussed that her A-fib is stable and attempted to give her reassurance that she is protected from blood clot or stroke with her Eliquis.  I have asked her to call if she has heart rate on average greater than 120-130 bpm or there are issues.  She verbalized understanding.

## 2025-04-27 NOTE — PROGRESS NOTES
Pt called reporting a fib hr 110. She will take one extra diltiazem 30 mg dose. She will call with any other issues. She is already on eliquis

## 2025-05-15 ENCOUNTER — HOSPITAL ENCOUNTER (OUTPATIENT)
Dept: BONE DENSITY | Facility: HOSPITAL | Age: 75
Discharge: HOME | End: 2025-05-15
Payer: MEDICARE

## 2025-05-15 ENCOUNTER — HOSPITAL ENCOUNTER (OUTPATIENT)
Dept: MAMMOGRAPHY | Facility: HOSPITAL | Age: 75
Discharge: HOME | End: 2025-05-15
Payer: MEDICARE

## 2025-05-15 DIAGNOSIS — Z12.31 ENCOUNTER FOR SCREENING MAMMOGRAM FOR MALIGNANT NEOPLASM OF BREAST: ICD-10-CM

## 2025-05-15 PROCEDURE — 77080 DXA BONE DENSITY AXIAL: CPT

## 2025-05-15 PROCEDURE — 77063 BREAST TOMOSYNTHESIS BI: CPT

## 2025-05-15 PROCEDURE — 77067 SCR MAMMO BI INCL CAD: CPT

## 2025-06-06 ENCOUNTER — TELEPHONE (OUTPATIENT)
Dept: INTERNAL MEDICINE | Facility: CLINIC | Age: 75
End: 2025-06-06
Payer: MEDICARE

## 2025-06-06 NOTE — TELEPHONE ENCOUNTER
Caller: Tung Brown    Relationship: Self    Best call back number: [phone]    What is the best time to reach you: ANY TIME , HOPEFULLY SOON.    Who are you requesting to speak with (clinical staff, provider,  specific staff member): DR GONZALEZ OR HIS MA    Do you know the name of the person who called: TUNG BROWN    What was the call regarding?   PATIENT STATES SHE THINKS HAS A FLARE UP OF DIVERTICULITIS.  SHE IS WANTING TO DISCUSS.    Is it okay if the provider responds through [email or web address]: NO    PLEASE ADVISE.

## 2025-06-06 NOTE — TELEPHONE ENCOUNTER
MA spoke with pt about possible diverticulosis flare options for her would be if she is in pain and needs treatment now to go to [facility] to be evaluated and treated pt states she is not in pain now MA suggested pt can call back if pain comes back and we can set her up with ARNP in our office

## 2025-06-07 ENCOUNTER — HOSPITAL ENCOUNTER (EMERGENCY)
Facility: HOSPITAL | Age: 75
Discharge: HOME | End: 2025-06-07
Attending: EMERGENCY MEDICINE
Payer: MEDICARE

## 2025-06-07 VITALS
DIASTOLIC BLOOD PRESSURE: 71 MMHG | HEART RATE: 67 BPM | SYSTOLIC BLOOD PRESSURE: 118 MMHG | RESPIRATION RATE: 18 BRPM | TEMPERATURE: 97.8 F | WEIGHT: 133 LBS | OXYGEN SATURATION: 97 % | BODY MASS INDEX: 24.48 KG/M2 | HEIGHT: 62 IN

## 2025-06-07 DIAGNOSIS — R14.1 ABDOMINAL GAS PAIN: Primary | ICD-10-CM

## 2025-06-07 LAB
ALBUMIN SERPL-MCNC: 4.1 G/DL (ref 3.5–5.2)
ALBUMIN/GLOB SERPL: 1.6 G/DL
ALP SERPL-CCNC: 62 U/L (ref 39–117)
ALT SERPL W P-5'-P-CCNC: 23 U/L (ref 1–33)
ANION GAP SERPL CALCULATED.3IONS-SCNC: 7.7 MMOL/L (ref 5–15)
AST SERPL-CCNC: 41 U/L (ref 1–32)
BACTERIA UR QL AUTO: ABNORMAL /HPF
BASOPHILS # BLD AUTO: 0.01 10*3/MM3 (ref 0–0.2)
BASOPHILS NFR BLD AUTO: 0.3 % (ref 0–1.5)
BILIRUB SERPL-MCNC: 0.6 MG/DL (ref 0–1.2)
BILIRUB UR QL STRIP: NEGATIVE
BUN SERPL-MCNC: 8.9 MG/DL (ref 8–23)
BUN/CREAT SERPL: 10.1 (ref 7–25)
CALCIUM SPEC-SCNC: 9.3 MG/DL (ref 8.6–10.5)
CHLORIDE SERPL-SCNC: 105 MMOL/L (ref 98–107)
CLARITY UR: CLEAR
CO2 SERPL-SCNC: 26.3 MMOL/L (ref 22–29)
COLOR UR: YELLOW
CREAT SERPL-MCNC: 0.88 MG/DL (ref 0.57–1)
D-LACTATE SERPL-SCNC: 1.1 MMOL/L (ref 0.5–2)
DEPRECATED RDW RBC AUTO: 42.4 FL (ref 37–54)
EGFRCR SERPLBLD CKD-EPI 2021: 68.2 ML/MIN/1.73
EOSINOPHIL # BLD AUTO: 0.08 10*3/MM3 (ref 0–0.4)
EOSINOPHIL NFR BLD AUTO: 2.4 % (ref 0.3–6.2)
ERYTHROCYTE [DISTWIDTH] IN BLOOD BY AUTOMATED COUNT: 12.3 % (ref 12.3–15.4)
GLOBULIN UR ELPH-MCNC: 2.5 GM/DL
GLUCOSE SERPL-MCNC: 96 MG/DL (ref 65–99)
GLUCOSE UR STRIP-MCNC: NEGATIVE MG/DL
HCT VFR BLD AUTO: 38.9 % (ref 34–46.6)
HGB BLD-MCNC: 12.6 G/DL (ref 12–15.9)
HGB UR QL STRIP.AUTO: ABNORMAL
HOLD SPECIMEN: NORMAL
HYALINE CASTS UR QL AUTO: ABNORMAL /LPF
IMM GRANULOCYTES # BLD AUTO: 0.01 10*3/MM3 (ref 0–0.05)
IMM GRANULOCYTES NFR BLD AUTO: 0.3 % (ref 0–0.5)
KETONES UR QL STRIP: NEGATIVE
LEUKOCYTE ESTERASE UR QL STRIP.AUTO: ABNORMAL
LYMPHOCYTES # BLD AUTO: 1.29 10*3/MM3 (ref 0.7–3.1)
LYMPHOCYTES NFR BLD AUTO: 37.9 % (ref 19.6–45.3)
MCH RBC QN AUTO: 30.8 PG (ref 26.6–33)
MCHC RBC AUTO-ENTMCNC: 32.4 G/DL (ref 31.5–35.7)
MCV RBC AUTO: 95.1 FL (ref 79–97)
MONOCYTES # BLD AUTO: 0.34 10*3/MM3 (ref 0.1–0.9)
MONOCYTES NFR BLD AUTO: 10 % (ref 5–12)
NEUTROPHILS NFR BLD AUTO: 1.67 10*3/MM3 (ref 1.7–7)
NEUTROPHILS NFR BLD AUTO: 49.1 % (ref 42.7–76)
NITRITE UR QL STRIP: NEGATIVE
PH UR STRIP.AUTO: 6.5 [PH] (ref 5–8)
PLATELET # BLD AUTO: 191 10*3/MM3 (ref 140–450)
PMV BLD AUTO: 9.7 FL (ref 6–12)
POTASSIUM SERPL-SCNC: 3.9 MMOL/L (ref 3.5–5.2)
PROT SERPL-MCNC: 6.6 G/DL (ref 6–8.5)
PROT UR QL STRIP: NEGATIVE
RBC # BLD AUTO: 4.09 10*6/MM3 (ref 3.77–5.28)
RBC # UR STRIP: ABNORMAL /HPF
REF LAB TEST METHOD: ABNORMAL
SODIUM SERPL-SCNC: 139 MMOL/L (ref 136–145)
SP GR UR STRIP: 1.01 (ref 1–1.03)
SQUAMOUS #/AREA URNS HPF: ABNORMAL /HPF
UROBILINOGEN UR QL STRIP: ABNORMAL
WBC # UR STRIP: ABNORMAL /HPF
WBC NRBC COR # BLD AUTO: 3.4 10*3/MM3 (ref 3.4–10.8)

## 2025-06-07 PROCEDURE — 99283 EMERGENCY DEPT VISIT LOW MDM: CPT

## 2025-06-07 PROCEDURE — 81001 URINALYSIS AUTO W/SCOPE: CPT | Performed by: EMERGENCY MEDICINE

## 2025-06-07 PROCEDURE — 80053 COMPREHEN METABOLIC PANEL: CPT | Performed by: EMERGENCY MEDICINE

## 2025-06-07 PROCEDURE — 83605 ASSAY OF LACTIC ACID: CPT | Performed by: EMERGENCY MEDICINE

## 2025-06-07 PROCEDURE — 85025 COMPLETE CBC W/AUTO DIFF WBC: CPT | Performed by: EMERGENCY MEDICINE

## 2025-06-07 RX ORDER — SODIUM CHLORIDE 0.9 % (FLUSH) 0.9 %
10 SYRINGE (ML) INJECTION AS NEEDED
Status: DISCONTINUED | OUTPATIENT
Start: 2025-06-07 | End: 2025-06-07 | Stop reason: HOSPADM

## 2025-06-07 NOTE — FSED PROVIDER NOTE
Subjective   History of Present Illness  73 yo female with abdominal pain started 4 days ago and she thought it might be diverticulitis again and she got concerned and started eating differently and the pain has almost resolved and she feels like she is just very gassy but got worried and came here today.  No known fever.  No nausea or vomiting or diarrhea.  Diagnosed with UTI about a week ago and she just found out that she has antibiotics at her pharmacy but it is closed so she has not actually been on any antibiotics for that.  She does not have significant suprapubic discomfort or frequent urination.  No low back pain.      Review of Systems    Past Medical History:   Diagnosis Date    Abnormal ECG June 30, 2023    taken to ER by EMS-A-Fib, faint, nausea, headache    Anticoagulated     FOR THE A FIB. HELD FOR PROCEDURE    Anxiety     Diverticulitis of colon     Dizziness     Fibrocystic breast     GERD (gastroesophageal reflux disease)     History of atrial fibrillation     DR [facility]    Hyperlipidemia     Hypertension 2/15/24    180/101-Middle of night    Hypothyroidism     PAD (peripheral artery disease)     Prediabetes     Screen for colon cancer 06/06/2024    Seizures     as a child; LAST SEIZURE AT AGE 28    Sleep apnea     UNABLE TO USE MACHINE. MILD. MD STATES DID NOT NEED TO USE    Slow to wake up after anesthesia     Syncopal episodes        Allergies   Allergen Reactions    Other Hives and Rash     FROM A STEROID INJECTION MANY YEARS AGO      Morphine Irritability    Ct Contrast Anxiety and Other (See Comments)     Body Tremor    Prednisone Rash       Past Surgical History:   Procedure Laterality Date    BLADDER SURGERY  2015    bladder lift    CERVICAL CONE BIOPSY  [id number]    COLONOSCOPY  02/01/2014    COLONOSCOPY N/A 9/18/2024    Procedure: COLONOSCOPY to cecum;  Surgeon: Gamaliel Wilkerson MD;  Location: [facility] ENDOSCOPY;  Service: General;  Laterality: N/A;  screening//normal    EYE SURGERY Right  01/19/2016    TEAR DUCT SURGERY/PARTIAL REMOVAL OF NASAL SINUS    EYE SURGERY Left 05/13/2011    TEAR DUCT SURGERY/DACRYOCYSTIRTHINOSTOMY AND REMOVAL OF ETHMOID SINUS    HYSTERECTOMY  03/25/2015    UTEROSACRAL SUSPENSION AND HYSTERECTOMY    OOPHORECTOMY      PAP SMEAR  01/18/2018    VOCAL CORD BIOPSY  08/12/2009       Family History   Problem Relation Age of Onset    Heart failure Mother     Thyroid disease Mother     Heart disease Mother     Lung disease Brother     Asthma Brother         half brother    Alcohol abuse Maternal Aunt     Cancer Maternal Aunt     Breast cancer Maternal Aunt     Alcohol abuse Maternal Aunt     Early death Maternal Aunt         aneurysm    Alcohol abuse Maternal Uncle     Cancer Maternal Uncle     Diabetes Maternal Uncle     Alcohol abuse Maternal Uncle     Diabetes Maternal Uncle     Alcohol abuse Maternal Uncle     Diabetes Maternal Uncle     Diabetes Maternal Grandmother     Ovarian cancer Neg Hx     Malig Hyperthermia Neg Hx        Social History     Socioeconomic History    Marital status:     Number of children: 1   Tobacco Use    Smoking status: Never     Passive exposure: Past (parents smoked in the home)    Smokeless tobacco: Never   Vaping Use    Vaping status: Never Used   Substance and Sexual Activity    Alcohol use: Yes     Alcohol/week: 1.0 standard drink of alcohol     Types: 1 Drinks containing 0.5 oz of alcohol per week     Comment: Infrequent    Drug use: No    Sexual activity: Not Currently     Partners: Male     Birth control/protection: Post-menopausal           Objective   Physical Exam  Vitals and nursing note reviewed.   Constitutional:       General: She is not in acute distress.     Appearance: Normal appearance. She is normal weight. She is not ill-appearing or toxic-appearing.   HENT:      Head: Atraumatic.      Mouth/Throat:      Mouth: Mucous membranes are moist.      Pharynx: Oropharynx is clear.   Eyes:      Extraocular Movements: Extraocular  movements intact.      Conjunctiva/sclera: Conjunctivae normal.   Cardiovascular:      Rate and Rhythm: Normal rate and regular rhythm.      Pulses: Normal pulses.      Heart sounds: Normal heart sounds. No murmur heard.  Pulmonary:      Effort: Pulmonary effort is normal.      Breath sounds: Normal breath sounds.      Comments: A single end expiratory wheeze on the left side she says she occasionally hears this when she is swimming.  Abdominal:      General: There is no distension.      Palpations: Abdomen is soft.      Tenderness: There is no abdominal tenderness. There is no guarding or rebound.      Comments: Bowel sounds are increased activity in the periumbilical area and surrounding areas suggesting small intestine activity and less profound in the lateral aspect of the body left and right.  Nontender.   Musculoskeletal:         General: Normal range of motion.   Skin:     General: Skin is warm and dry.      Capillary Refill: Capillary refill takes less than 2 seconds.   Neurological:      General: No focal deficit present.      Mental Status: She is alert and oriented to person, place, and time. Mental status is at baseline.   Psychiatric:         Mood and Affect: Mood normal.         Behavior: Behavior normal.         Thought Content: Thought content normal.         Judgment: Judgment normal.         Procedures           ED Course  ED Course as of 06/07/25 1756   Sat Jun 07, 2025   1651 UA small blood trace leuks [AR]   1705 CBC w diff normal except slight decrease in N absolute [AR]   1741 Lactate 1.1 no sepsis, CMP normal except AST slightly elevated, not a concern to day [AR]      ED Course User Index  [AR] Lucia Giordano MD                                           Medical Decision Making  Differential for abdominal pain includes, but not limited to: MI, pericarditis, pneumonia, PE, abdominal aortic aneurysm (AAA), mesenteric ischemia, diabetic ketoacidosis (DKA), hepatic mass or abscess,  cholecystitis, cholelithiasis, cholangitis, peptic ulcer, perforating peptic ulcer, esophagitis, appendicitis, peritonitis, IBD, IBS, Crohn's disease, ulcerative colitis, pancreatitis, mass or cyst      Will get blood work and urine.  She declined any kind of Bentyl or Toradol for discomfort.  She has normal vital signs I do not think IV fluids are necessary at this time.  And then we will talk about a CT I am not sure would be of benefit at this time.    The labs looked good today and not suggestive of an concerning infection. You did not have a fever. This could change, you may develop a fever or more abdominal pain and if you do, please return here. The walnuts may have stirred up your gut. You can blend walnuts or any nut until smooth and eat like peanut butter, just chewed up whole nuts, this can cause a problem. Also whenever your gut does not feel right, I encourage you to drink bone broth, brand name ZoKwikpik, found at [employer] and other grocery stores, chicken with less salt or regular and beef are available. Bone broth feeds your gut bacteria, soothes the lining of your gut and gives you nutrition, protein and fluids. Please follow up with your PCP next week as needed.    She understood and agreed.    Amount and/or Complexity of Data Reviewed  External Data Reviewed: notes.     Details: CT 5/22/24 abdomen pelvis  IMPRESSION:  1. Mild wall thickening and injection of the surrounding fat involving a  segment of sigmoid colon.  2. Mass-like soft tissue thickening along the leftward aspect of the  sigmoid colon. No loculated fluid collections are seen.  3. The findings could all represent diverticulitis with an associated  phlegmon. As this does have a somewhat mass-like appearance, short-term  follow-up CT scan of the abdomen and pelvis suggested.  4. Tiny amount of free fluid in the pelvis.  5. No free air is seen.  6. Status post hysterectomy.    Labs: ordered.    Risk  Prescription drug management.        Final  diagnoses:   Abdominal gas pain       ED Disposition  ED Disposition       ED Disposition   Discharge    Condition   Stable    Comment   --               Julian Garg MD  [address] [address]  [phone]      As needed         Medication List      No changes were made to your prescriptions during this visit.

## 2025-06-07 NOTE — DISCHARGE INSTRUCTIONS
The labs looked good today and not suggestive of an concerning infection. You did not have a fever. This could change, you may develop a fever or more abdominal pain and if you do, please return here. The walnuts may have stirred up your gut. You can blend walnuts or any nut until smooth and eat like peanut butter, just chewed up whole nuts, this can cause a problem. Also whenever your gut does not feel right, I encourage you to drink bone broth, brand name Glympse, found at [employer] and other grocery stores, chicken with less salt or regular and beef are available. Bone broth feeds your gut bacteria, soothes the lining of your gut and gives you nutrition, protein and fluids. Please follow up with your PCP next week as needed.

## (undated) DEVICE — SENSR O2 OXIMAX FNGR A/ 18IN NONSTR

## (undated) DEVICE — TUBING, SUCTION, 1/4" X 10', STRAIGHT: Brand: MEDLINE

## (undated) DEVICE — ADAPT CLN BIOGUARD AIR/H2O DISP

## (undated) DEVICE — KT ORCA ORCAPOD DISP STRL

## (undated) DEVICE — LN SMPL CO2 SHTRM SD STREAM W/M LUER

## (undated) DEVICE — CANN O2 ETCO2 FITS ALL CONN CO2 SMPL A/ 7IN DISP LF